# Patient Record
Sex: FEMALE | Race: BLACK OR AFRICAN AMERICAN | NOT HISPANIC OR LATINO | Employment: OTHER | URBAN - METROPOLITAN AREA
[De-identification: names, ages, dates, MRNs, and addresses within clinical notes are randomized per-mention and may not be internally consistent; named-entity substitution may affect disease eponyms.]

---

## 2019-12-04 ENCOUNTER — TELEPHONE (OUTPATIENT)
Dept: FAMILY MEDICINE | Facility: CLINIC | Age: 59
End: 2019-12-04

## 2022-10-05 ENCOUNTER — HISTORICAL (OUTPATIENT)
Dept: ADMINISTRATIVE | Facility: HOSPITAL | Age: 62
End: 2022-10-05

## 2023-07-15 ENCOUNTER — HOSPITAL ENCOUNTER (EMERGENCY)
Facility: HOSPITAL | Age: 63
Discharge: HOME | End: 2023-07-15
Payer: MEDICARE

## 2023-07-15 VITALS
SYSTOLIC BLOOD PRESSURE: 162 MMHG | RESPIRATION RATE: 22 BRPM | HEART RATE: 64 BPM | DIASTOLIC BLOOD PRESSURE: 83 MMHG | TEMPERATURE: 98 F | OXYGEN SATURATION: 95 % | WEIGHT: 230 LBS

## 2023-07-15 DIAGNOSIS — F41.9 ANXIETY: ICD-10-CM

## 2023-07-15 DIAGNOSIS — R07.9 CHEST PAIN: ICD-10-CM

## 2023-07-15 DIAGNOSIS — R06.02 SOB (SHORTNESS OF BREATH): Primary | ICD-10-CM

## 2023-07-15 LAB
ALBUMIN SERPL-MCNC: 4.4 G/DL (ref 3.4–5)
ALBUMIN/GLOB SERPL: 1.7 RATIO
ALP SERPL-CCNC: 72 UNIT/L (ref 50–144)
ALT SERPL-CCNC: 27 UNIT/L (ref 1–45)
ANION GAP SERPL CALC-SCNC: 3 MEQ/L (ref 2–13)
AST SERPL-CCNC: 36 UNIT/L (ref 14–36)
BASOPHILS # BLD AUTO: 0.02 X10(3)/MCL (ref 0.01–0.08)
BASOPHILS NFR BLD AUTO: 0.2 % (ref 0.1–1.2)
BILIRUBIN DIRECT+TOT PNL SERPL-MCNC: 0.3 MG/DL (ref 0–1)
BNP BLD-MCNC: 45.2 PG/ML (ref 10–450)
BUN SERPL-MCNC: 17 MG/DL (ref 7–20)
C-REACTIVE PROTEIN(NORTH LA, ST. MARY, RP & JENNINGS): 0.7 MG/DL (ref 0–0.9)
CALCIUM SERPL-MCNC: 8.9 MG/DL (ref 8.4–10.2)
CHLORIDE SERPL-SCNC: 106 MMOL/L (ref 98–110)
CO2 SERPL-SCNC: 29 MMOL/L (ref 21–32)
CREAT SERPL-MCNC: 1.14 MG/DL (ref 0.66–1.25)
CREAT/UREA NIT SERPL: 15 (ref 12–20)
D DIMER PPP IA.FEU-MCNC: 0.21 MG/L (ref 0.19–0.5)
EOSINOPHIL # BLD AUTO: 0.23 X10(3)/MCL (ref 0.04–0.36)
EOSINOPHIL NFR BLD AUTO: 2.5 % (ref 0.7–7)
ERYTHROCYTE [DISTWIDTH] IN BLOOD BY AUTOMATED COUNT: 12.8 % (ref 11–14.5)
GFR SERPLBLD CREATININE-BSD FMLA CKD-EPI: 54 MLS/MIN/1.73/M2
GLOBULIN SER-MCNC: 2.6 GM/DL (ref 2–3.9)
GLUCOSE SERPL-MCNC: 119 MG/DL (ref 70–115)
HCT VFR BLD AUTO: 37.1 % (ref 36–48)
HGB BLD-MCNC: 12.4 G/DL (ref 11.8–16)
IMM GRANULOCYTES # BLD AUTO: 0.03 X10(3)/MCL (ref 0–0.03)
IMM GRANULOCYTES NFR BLD AUTO: 0.3 % (ref 0–0.5)
INFLUENZA A (OHS): NEGATIVE
INFLUENZA B (OHS): NEGATIVE
LYMPHOCYTES # BLD AUTO: 2.85 X10(3)/MCL (ref 1.16–3.74)
LYMPHOCYTES NFR BLD AUTO: 30.9 % (ref 20–55)
MCH RBC QN AUTO: 29.4 PG (ref 27–34)
MCHC RBC AUTO-ENTMCNC: 33.4 G/DL (ref 31–37)
MCV RBC AUTO: 87.9 FL (ref 79–99)
MONOCYTES # BLD AUTO: 0.77 X10(3)/MCL (ref 0.24–0.36)
MONOCYTES NFR BLD AUTO: 8.4 % (ref 4.7–12.5)
NEUTROPHILS # BLD AUTO: 5.32 X10(3)/MCL (ref 1.56–6.13)
NEUTROPHILS NFR BLD AUTO: 57.7 % (ref 37–73)
NRBC BLD AUTO-RTO: 0 %
PLATELET # BLD AUTO: 226 X10(3)/MCL (ref 140–371)
PMV BLD AUTO: 10.6 FL (ref 9.4–12.4)
POTASSIUM SERPL-SCNC: 4 MMOL/L (ref 3.5–5.1)
PROT SERPL-MCNC: 7 GM/DL (ref 6.3–8.2)
RAPID GROUP A STREP (OHS): NEGATIVE
RBC # BLD AUTO: 4.22 X10(6)/MCL (ref 4–5.1)
SARS-COV-2 RDRP RESP QL NAA+PROBE: NEGATIVE
SODIUM SERPL-SCNC: 138 MMOL/L (ref 135–145)
TROPONIN I SERPL-MCNC: <0.012 NG/ML (ref 0–0.03)
WBC # SPEC AUTO: 9.22 X10(3)/MCL (ref 4–11.5)

## 2023-07-15 PROCEDURE — 85379 FIBRIN DEGRADATION QUANT: CPT

## 2023-07-15 PROCEDURE — 84484 ASSAY OF TROPONIN QUANT: CPT

## 2023-07-15 PROCEDURE — 87635 SARS-COV-2 COVID-19 AMP PRB: CPT

## 2023-07-15 PROCEDURE — 36415 COLL VENOUS BLD VENIPUNCTURE: CPT

## 2023-07-15 PROCEDURE — 85025 COMPLETE CBC W/AUTO DIFF WBC: CPT

## 2023-07-15 PROCEDURE — 87651 STREP A DNA AMP PROBE: CPT

## 2023-07-15 PROCEDURE — 99285 EMERGENCY DEPT VISIT HI MDM: CPT | Mod: 25

## 2023-07-15 PROCEDURE — 83880 ASSAY OF NATRIURETIC PEPTIDE: CPT

## 2023-07-15 PROCEDURE — 86140 C-REACTIVE PROTEIN: CPT

## 2023-07-15 PROCEDURE — 80053 COMPREHEN METABOLIC PANEL: CPT

## 2023-07-15 PROCEDURE — 87400 INFLUENZA A/B EACH AG IA: CPT

## 2023-07-15 RX ORDER — LOSARTAN POTASSIUM 50 MG/1
50 TABLET ORAL NIGHTLY
COMMUNITY
Start: 2023-06-13

## 2023-07-15 RX ORDER — ESCITALOPRAM OXALATE 10 MG/1
20 TABLET ORAL NIGHTLY
COMMUNITY
Start: 2023-06-22

## 2023-07-15 RX ORDER — LIOTHYRONINE SODIUM 5 UG/1
5 TABLET ORAL EVERY MORNING
COMMUNITY
Start: 2023-06-05

## 2023-07-15 RX ORDER — ROSUVASTATIN CALCIUM 40 MG/1
40 TABLET, COATED ORAL DAILY
COMMUNITY
Start: 2023-06-22

## 2023-07-15 RX ORDER — ESTRADIOL 1 MG/1
1 TABLET ORAL DAILY
Status: ON HOLD | COMMUNITY
Start: 2023-06-06 | End: 2023-10-27

## 2023-07-15 RX ORDER — LEVOTHYROXINE SODIUM 100 UG/1
100 TABLET ORAL DAILY
COMMUNITY
Start: 2023-06-02

## 2023-07-15 NOTE — ED PROVIDER NOTES
Encounter Date: 7/15/2023       History     Chief Complaint   Patient presents with    Chest Pain     Tightness and heaviness    Sore Throat     Throat discomfort    Cough    Shortness of Breath     Reports all sx onset x2 weeks, was seen at urgent care 07/12/23. Last albuterol tx taken 2.5 hrs PTA     63-year-old female presents complaining of cough, sore throat, congestion, shortness of breath for over a month.  Developed some tightness and heaviness in her chest tonight, and decided to come to the emergency room.  She was seen in urgent care few days ago, and prescribed cough medicine and inhaler.  She denies any fever or chills.  She denies any nausea, vomiting or diarrhea.  She has a history of lupus, and thinks her symptoms are due to that.  She also had some pedal edema of the other day.    The history is provided by the patient.   Review of patient's allergies indicates:   Allergen Reactions    Azithromycin     Levofloxacin      Past Medical History:   Diagnosis Date    Depression     Hypertension     Stroke     Systemic lupus erythematosus, organ or system involvement unspecified      Past Surgical History:   Procedure Laterality Date    CHOLECYSTECTOMY      HYSTERECTOMY      THYROIDECTOMY       History reviewed. No pertinent family history.     Review of Systems   Constitutional:  Negative for fever.   HENT:  Positive for congestion and sore throat.    Respiratory:  Positive for cough and shortness of breath.    Cardiovascular:  Positive for chest pain and leg swelling.   Gastrointestinal:  Negative for nausea.   Genitourinary:  Negative for dysuria.   Musculoskeletal:  Negative for back pain.   Skin:  Negative for rash.   Neurological:  Negative for weakness.   Hematological:  Does not bruise/bleed easily.   Psychiatric/Behavioral:  The patient is nervous/anxious.    All other systems reviewed and are negative.    Physical Exam     Initial Vitals [07/15/23 0020]   BP Pulse Resp Temp SpO2   (!) 143/98 77  17 98.3 °F (36.8 °C) 97 %      MAP       --         Physical Exam    Nursing note and vitals reviewed.  Constitutional: Vital signs are normal. She appears well-developed and well-nourished. She is cooperative.   HENT:   Head: Normocephalic and atraumatic.   Nose: Nose normal.   Mouth/Throat: Oropharynx is clear and moist.   She has mild hoarseness   Eyes: Conjunctivae, EOM and lids are normal. Pupils are equal, round, and reactive to light.   Neck: Trachea normal. Neck supple.   Normal range of motion.  Cardiovascular:  Normal rate, regular rhythm, normal heart sounds and intact distal pulses.           Pulmonary/Chest: Breath sounds normal. No respiratory distress. She has no wheezes. She has no rhonchi. She has no rales.   Abdominal: Abdomen is soft. Bowel sounds are normal.   Musculoskeletal:         General: Normal range of motion.      Cervical back: Normal, normal range of motion and neck supple.      Lumbar back: Normal.     Lymphadenopathy:     She has no cervical adenopathy.   Neurological: She is alert and oriented to person, place, and time. She has normal strength. Coordination normal.   Skin: Skin is warm, dry and intact. Capillary refill takes less than 2 seconds.   Psychiatric: Her speech is normal and behavior is normal. Judgment and thought content normal. Cognition and memory are normal.   She seems a bit anxious     ED Course   Procedures  Labs Reviewed   COMPREHENSIVE METABOLIC PANEL - Abnormal; Notable for the following components:       Result Value    Glucose Level 119 (*)     All other components within normal limits   CBC WITH DIFFERENTIAL - Abnormal; Notable for the following components:    Mono # 0.77 (*)     All other components within normal limits   RAPID INFLUENZA A/B - Normal   THROAT SCREEN, RAPID STREP - Normal   TROPONIN I - Normal   NT-PRO NATRIURETIC PEPTIDE - Normal   D DIMER, QUANTITATIVE - Normal   SARS-COV-2 RNA AMPLIFICATION, QUAL - Normal   C-REACTIVE PROTEIN - Normal    CBC W/ AUTO DIFFERENTIAL    Narrative:     The following orders were created for panel order CBC auto differential.  Procedure                               Abnormality         Status                     ---------                               -----------         ------                     CBC with Differential[[id number]]        Abnormal            Final result                 Please view results for these tests on the individual orders.        ECG Results              EKG 12-lead (Preliminary result)  Result time 07/15/23 00:19:23      Wet Read by Stanislav Herzog MD (07/15/23 00:19:23, [facility], [facility])    Normal sinus rhythm, heart rate 62, normal axis, normal intervals, normal P waves, normal QRS, normal T-waves, normal ST segments.  This is a normal EKG.                                  Imaging Results              X-Ray Chest PA And Lateral (Preliminary result)  Result time 07/15/23 00:54:39      Wet Read by Stanislav Herzog MD (07/15/23 00:54:39, [facility])    Normal cardiac silhouette, clear lung fields, no infiltrates                                     Medications - No data to display  Medical Decision Making:   Initial Assessment:   Cough congestion, sore throat, shortness of breath for a month.  Atypical chest pain today.  Anxiety.  Differential Diagnosis:   Syndrome, ACS, CHF, lupus flare up, pulmonary fibrosis, obstructive sleep apnea, anxiety disorder  Clinical Tests:   Lab Tests: Ordered  Radiological Study: Ordered  Medical Tests: Ordered  ED Management:  EKG, chest x-ray, labs  Entire workup is negative.  Vital signs and oxygenation are all good.  Continue with inhaler as needed.  Follow up with PCP on Monday.                        Clinical Impression:   Final diagnoses:  [R07.9] Chest pain  [R06.02] SOB (shortness of breath) (Primary)  [F41.9] Anxiety        ED Disposition Condition    Discharge Good           ED Prescriptions    None       Follow-up Information       Follow up With Specialties Details Why Contact Info    Deo Jacobs MD Family Medicine Call in 2 days  [address] [facility]  [address] [place] [id number] [phone]               Stanislav Herzog MD  07/15/23 0111

## 2023-10-05 ENCOUNTER — TELEPHONE (OUTPATIENT)
Dept: GASTROENTEROLOGY | Facility: CLINIC | Age: 63
End: 2023-10-05
Payer: MEDICARE

## 2023-10-05 NOTE — TELEPHONE ENCOUNTER
I Spoke to patient and she is c/o bloating, stomach pain right side, pain also under stomach, diarrhea, constipation, severe heart burn then it feels like her stomach is on fire.  She states she has lupus and she has had parasites before.  Also wants to get a Cologuard sent to her.

## 2023-10-05 NOTE — TELEPHONE ENCOUNTER
----- Message from Donna Nino sent at 10/5/2023  8:49 AM CDT -----  Contact: self  Pt is a lupus pt . She is stating that its possible that she has parasites in her intestines again. She is having pain in her lower abdomen. Please call pt asap at [phone] . Pt also wasn't cologuard sent to her home before appt .

## 2023-10-09 ENCOUNTER — TELEPHONE (OUTPATIENT)
Dept: GASTROENTEROLOGY | Facility: CLINIC | Age: 63
End: 2023-10-09
Payer: MEDICARE

## 2023-10-09 NOTE — TELEPHONE ENCOUNTER
----- Message from Autumn Sesay sent at 10/9/2023  8:36 AM CDT -----  Contact: self  Type: Staff Message  Caller: Briseyda Torres  Call Back Number: [phone]  Nature of the Call: pt is needing a sooner apt and only wants to see her DrDai Not a Np  Additional Information: na

## 2023-10-09 NOTE — TELEPHONE ENCOUNTER
Spoke to patient. She states she is having the same problems that she was when she had the parasite (giardia lamblia) back in 2018. Pt very hoarse and having issues with swallowing. -kg

## 2023-10-16 ENCOUNTER — PATIENT MESSAGE (OUTPATIENT)
Dept: GASTROENTEROLOGY | Facility: CLINIC | Age: 63
End: 2023-10-16
Payer: MEDICARE

## 2023-10-25 ENCOUNTER — PATIENT MESSAGE (OUTPATIENT)
Dept: ADMINISTRATIVE | Facility: OTHER | Age: 63
End: 2023-10-25
Payer: MEDICARE

## 2023-10-25 ENCOUNTER — LAB VISIT (OUTPATIENT)
Dept: LAB | Facility: HOSPITAL | Age: 63
End: 2023-10-25
Attending: ORTHOPAEDIC SURGERY
Payer: MEDICARE

## 2023-10-25 ENCOUNTER — OFFICE VISIT (OUTPATIENT)
Dept: ORTHOPEDICS | Facility: CLINIC | Age: 63
End: 2023-10-25
Payer: MEDICARE

## 2023-10-25 VITALS
RESPIRATION RATE: 18 BRPM | SYSTOLIC BLOOD PRESSURE: 149 MMHG | DIASTOLIC BLOOD PRESSURE: 80 MMHG | BODY MASS INDEX: 36.95 KG/M2 | WEIGHT: 229.94 LBS | HEIGHT: 66 IN | HEART RATE: 90 BPM

## 2023-10-25 DIAGNOSIS — I82.402 ACUTE DEEP VEIN THROMBOSIS (DVT) OF LEFT LOWER EXTREMITY, UNSPECIFIED VEIN: ICD-10-CM

## 2023-10-25 DIAGNOSIS — S82.042A CLOSED DISPLACED COMMINUTED FRACTURE OF LEFT PATELLA, INITIAL ENCOUNTER: ICD-10-CM

## 2023-10-25 DIAGNOSIS — S82.042A CLOSED DISPLACED COMMINUTED FRACTURE OF LEFT PATELLA, INITIAL ENCOUNTER: Primary | ICD-10-CM

## 2023-10-25 LAB
ABO AND RH: NORMAL
ALBUMIN SERPL-MCNC: 4.7 G/DL (ref 3.4–5)
ALBUMIN/GLOB SERPL: 1.8 RATIO
ALP SERPL-CCNC: 86 UNIT/L (ref 50–144)
ALT SERPL-CCNC: 23 UNIT/L (ref 1–45)
ANION GAP SERPL CALC-SCNC: 7 MEQ/L (ref 2–13)
ANTIBODY SCREEN: NORMAL
AST SERPL-CCNC: 37 UNIT/L (ref 14–36)
BASOPHILS # BLD AUTO: 0.03 X10(3)/MCL (ref 0.01–0.08)
BASOPHILS NFR BLD AUTO: 0.4 % (ref 0.1–1.2)
BILIRUB SERPL-MCNC: 0.7 MG/DL (ref 0–1)
BUN SERPL-MCNC: 14 MG/DL (ref 7–20)
CALCIUM SERPL-MCNC: 9.4 MG/DL (ref 8.4–10.2)
CHLORIDE SERPL-SCNC: 104 MMOL/L (ref 98–110)
CO2 SERPL-SCNC: 30 MMOL/L (ref 21–32)
CREAT SERPL-MCNC: 0.94 MG/DL (ref 0.66–1.25)
CREAT/UREA NIT SERPL: 15 (ref 12–20)
EOSINOPHIL # BLD AUTO: 0.2 X10(3)/MCL (ref 0.04–0.36)
EOSINOPHIL NFR BLD AUTO: 2.7 % (ref 0.7–7)
ERYTHROCYTE [DISTWIDTH] IN BLOOD BY AUTOMATED COUNT: 13.4 % (ref 11–14.5)
GFR SERPLBLD CREATININE-BSD FMLA CKD-EPI: 68 MLS/MIN/1.73/M2
GLOBULIN SER-MCNC: 2.6 GM/DL (ref 2–3.9)
GLUCOSE SERPL-MCNC: 94 MG/DL (ref 70–115)
HCT VFR BLD AUTO: 34.7 % (ref 36–48)
HGB BLD-MCNC: 11.3 G/DL (ref 11.8–16)
IMM GRANULOCYTES # BLD AUTO: 0.01 X10(3)/MCL (ref 0–0.03)
IMM GRANULOCYTES NFR BLD AUTO: 0.1 % (ref 0–0.5)
INR PPP: 1
LYMPHOCYTES # BLD AUTO: 2.32 X10(3)/MCL (ref 1.16–3.74)
LYMPHOCYTES NFR BLD AUTO: 31.4 % (ref 20–55)
MCH RBC QN AUTO: 28.9 PG (ref 27–34)
MCHC RBC AUTO-ENTMCNC: 32.6 G/DL (ref 31–37)
MCV RBC AUTO: 88.7 FL (ref 79–99)
MONOCYTES # BLD AUTO: 0.64 X10(3)/MCL (ref 0.24–0.36)
MONOCYTES NFR BLD AUTO: 8.7 % (ref 4.7–12.5)
NEUTROPHILS # BLD AUTO: 4.18 X10(3)/MCL (ref 1.56–6.13)
NEUTROPHILS NFR BLD AUTO: 56.7 % (ref 37–73)
NRBC BLD AUTO-RTO: 0 %
PLATELET # BLD AUTO: 281 X10(3)/MCL (ref 140–371)
PMV BLD AUTO: 9.4 FL (ref 9.4–12.4)
POTASSIUM SERPL-SCNC: 4.1 MMOL/L (ref 3.5–5.1)
PROT SERPL-MCNC: 7.3 GM/DL (ref 6.3–8.2)
PROTHROMBIN TIME: 10.5 SECONDS (ref 9.3–11.9)
RBC # BLD AUTO: 3.91 X10(6)/MCL (ref 4–5.1)
SODIUM SERPL-SCNC: 141 MMOL/L (ref 135–145)
SPECIMEN OUTDATE: NORMAL
WBC # SPEC AUTO: 7.38 X10(3)/MCL (ref 4–11.5)

## 2023-10-25 PROCEDURE — 80053 COMPREHEN METABOLIC PANEL: CPT

## 2023-10-25 PROCEDURE — 85025 COMPLETE CBC W/AUTO DIFF WBC: CPT

## 2023-10-25 PROCEDURE — 99204 OFFICE O/P NEW MOD 45 MIN: CPT | Mod: ,,, | Performed by: ORTHOPAEDIC SURGERY

## 2023-10-25 PROCEDURE — 85610 PROTHROMBIN TIME: CPT

## 2023-10-25 PROCEDURE — 86901 BLOOD TYPING SEROLOGIC RH(D): CPT | Performed by: ORTHOPAEDIC SURGERY

## 2023-10-25 PROCEDURE — 36415 COLL VENOUS BLD VENIPUNCTURE: CPT

## 2023-10-25 PROCEDURE — 99204 PR OFFICE/OUTPT VISIT, NEW, LEVL IV, 45-59 MIN: ICD-10-PCS | Mod: ,,, | Performed by: ORTHOPAEDIC SURGERY

## 2023-10-25 RX ORDER — PSEUDOEPHEDRINE HCL 120 MG/1
120 TABLET, FILM COATED, EXTENDED RELEASE ORAL 2 TIMES DAILY PRN
Status: ON HOLD | COMMUNITY
End: 2023-11-27 | Stop reason: HOSPADM

## 2023-10-25 RX ORDER — SUMATRIPTAN SUCCINATE 25 MG/1
1 TABLET ORAL DAILY PRN
COMMUNITY
Start: 2023-10-02

## 2023-10-25 RX ORDER — PROMETHAZINE HYDROCHLORIDE 25 MG/1
25 TABLET ORAL EVERY 6 HOURS PRN
COMMUNITY
Start: 2023-08-01

## 2023-10-25 RX ORDER — OXYCODONE AND ACETAMINOPHEN 10; 325 MG/1; MG/1
1 TABLET ORAL EVERY 6 HOURS PRN
Status: ON HOLD | COMMUNITY
Start: 2023-10-13 | End: 2023-10-27 | Stop reason: SDUPTHER

## 2023-10-25 RX ORDER — MUPIROCIN 20 MG/G
OINTMENT TOPICAL
Status: CANCELLED | OUTPATIENT
Start: 2023-10-25

## 2023-10-25 RX ORDER — ALPRAZOLAM 1 MG/1
1 TABLET ORAL NIGHTLY
COMMUNITY
Start: 2023-10-02

## 2023-10-25 RX ORDER — ENOXAPARIN SODIUM 100 MG/ML
100 INJECTION SUBCUTANEOUS
Status: ON HOLD | COMMUNITY
End: 2023-10-27 | Stop reason: HOSPADM

## 2023-10-25 RX ORDER — EPINEPHRINE 0.3 MG/.3ML
INJECTION SUBCUTANEOUS
COMMUNITY
Start: 2023-07-17

## 2023-10-25 NOTE — H&P (VIEW-ONLY)
Subjective:       Patient ID: Briseyda Torres is a 63 y.o. female.    Chief Complaint   Patient presents with    Left Knee - Injury     Doi 10/12/2023, ref by Dr. Cote in [place].  Left patella fx, brought disc.  In leg splint, nwb.  +dvt yesterday.  Has not started lovenox.  Pharmacy was out of stock.        Patient is here today for initial evaluation of injuries sustained to her left knee in a fall 2 weeks ago.  She had a difficult time scheduling an appointment with her local orthopedic surgeon after a fall.  She was found to have a displaced comminuted patella fracture.  She was also diagnosed with a left lower extremity DVT.  She was referred to me for management of her patella fracture.  She has been provided with a prescription for weight based Lovenox which she will take prior to surgery.  Plan for Xarelto 10 mg p.o. b.i.d. postop starting on postop day 1.  She has been nonweightbearing to the left lower extremity.  She was placed into a long leg splint in the orthopedics office and [place] prior to her referral to see me.  She states that her pain is controlled.  She is had multiple orthopedic surgeries in the past as tolerated them well.  She is ready for operative stabilization of her left patella.      Injury  Pertinent negatives include no abdominal pain, chest pain, chills, congestion, coughing, fever, nausea, neck pain, numbness or vomiting.       Review of Systems   Constitutional: Negative for chills, fever and malaise/fatigue.   HENT:  Negative for congestion and hearing loss.    Eyes:  Negative for visual disturbance.   Cardiovascular:  Negative for chest pain and syncope.   Respiratory:  Negative for cough and shortness of breath.    Hematologic/Lymphatic: Does not bruise/bleed easily.   Skin:  Negative for color change and suspicious lesions.   Musculoskeletal:  Negative for falls and neck pain.   Gastrointestinal:  Negative for abdominal pain, nausea and vomiting.   Genitourinary:  " Negative for dysuria and hematuria.   Neurological:  Negative for numbness and sensory change.   Psychiatric/Behavioral:  Negative for altered mental status. The patient is not nervous/anxious.         Current Outpatient Medications on File Prior to Visit   Medication Sig Dispense Refill    EScitalopram oxalate (LEXAPRO) 10 MG tablet Take 20 mg by mouth every evening.      estradioL (ESTRACE) 1 MG tablet Take 1 mg by mouth once daily.      levothyroxine (SYNTHROID) 100 MCG tablet Take 100 mcg by mouth once daily.      liothyronine (CYTOMEL) 5 MCG Tab Take 5 mcg by mouth every morning.      losartan (COZAAR) 25 MG tablet Take 25 mg by mouth every evening.      rosuvastatin (CRESTOR) 20 MG tablet Take 20 mg by mouth once daily.       No current facility-administered medications on file prior to visit.          Objective:      BP (!) 149/80   Pulse 90   Resp 18   Ht 5' 6" (1.676 m)   Wt 104.3 kg (229 lb 15 oz)   BMI 37.11 kg/m²   Physical Exam  Constitutional:       General: She is not in acute distress.     Appearance: Normal appearance. She is not ill-appearing.   HENT:      Head: Normocephalic and atraumatic.      Nose: No congestion.   Eyes:      Extraocular Movements: Extraocular movements intact.   Cardiovascular:      Rate and Rhythm: Normal rate and regular rhythm.      Pulses: Normal pulses.   Pulmonary:      Effort: Pulmonary effort is normal.      Breath sounds: Normal breath sounds.   Abdominal:      General: There is no distension.      Palpations: Abdomen is soft.      Tenderness: There is no abdominal tenderness.   Musculoskeletal:      Comments: Left lower extremity:  Swelling noted throughout the limb.  No soft tissue disruption of the anterior aspect of the knee.  No painful or prominent fracture fragments.  No attempted range motion performed.  She does have an effusion of the knee.  She is able to perform dorsiflexion plantar flexion of the ankle and digits.  Palpable DP pulse.  Her sensation " light touch is intact distally.   Skin:     General: Skin is warm and dry.   Neurological:      Mental Status: She is alert and oriented to person, place, and time. Mental status is at baseline.   Psychiatric:         Mood and Affect: Mood normal.         Behavior: Behavior normal.         Thought Content: Thought content normal.         Judgment: Judgment normal.        Body mass index is 37.11 kg/m².    Radiology:   X-ray evaluation left knee brought in from an outside office demonstrate a comminuted displaced left patella fracture.      Assessment:         1. Closed displaced comminuted fracture of left patella, initial encounter  Full code    Place in Outpatient    Case Request Operating Room: ORIF, FRACTURE, PATELLA, LEFT    Vital signs    Insert peripheral IV    Verify informed consent    Diet NPO    Reason for no Mechanical VTE Prophylaxis    CBC auto differential    Comprehensive metabolic panel    POCT urine pregnancy    Type & Screen    Protime-INR    Protime-INR    Full code    Insert peripheral IV    Verify informed consent    Diet NPO    Reason for no Mechanical VTE Prophylaxis      2. Acute deep vein thrombosis (DVT) of left lower extremity, unspecified vein  Full code    Place in Outpatient    Case Request Operating Room: ORIF, FRACTURE, PATELLA, LEFT    Vital signs    Insert peripheral IV    Verify informed consent    Diet NPO    Reason for no Mechanical VTE Prophylaxis    CBC auto differential    Comprehensive metabolic panel    POCT urine pregnancy    Type & Screen    Protime-INR    Protime-INR    Full code    Insert peripheral IV    Verify informed consent    Diet NPO    Reason for no Mechanical VTE Prophylaxis              Plan:       Extensive discussion was held with the patient today she will benefit from operative stabilization of her left patella fracture.The risks, benefits and alternatives treatment were discussed at length with the patient today including but not limited to pain,  bleeding, scarring, infection, damage to neurovascular structures, malunion/nonunion, hardware failure/irritation, need for future procedures and complications leading to amputation and even death.  Plan to have her use the Lovenox that was provided her this evening and tomorrow, she will hold the morning of surgery.  Plan for operative stabilization of her left patella on Friday.  She will begin Xarelto on Saturday.  She will be placed into a hinged knee brace locked in full extension today.  She can weight bear as tolerated in full extension.  She is happy this plan of care and all questions and concerns were addressed.    This note/OR report was created with the assistance of  voice recognition software or phone  dictation.  There may be transcription errors as a result of using this technology however minimal. Effort has been made to assure accuracy of transcription but any obvious errors or omissions should be clarified with the author of the document.         Curtis Corona MD  Orthopedic Trauma  [facility]      No follow-ups on file.    Closed displaced comminuted fracture of left patella, initial encounter  -     Full code; Standing  -     Place in Outpatient; Standing  -     Case Request Operating Room: ORIF, FRACTURE, PATELLA, LEFT  -     Vital signs; Standing  -     Insert peripheral IV; Standing  -     Verify informed consent; Standing  -     Diet NPO; Standing  -     Reason for no Mechanical VTE Prophylaxis; Standing  -     CBC auto differential; Future; Expected date: 10/25/2023  -     Comprehensive metabolic panel; Future; Expected date: 10/25/2023  -     POCT urine pregnancy  -     Type & Screen; Future; Expected date: 10/25/2023  -     Protime-INR; Standing  -     Protime-INR; Future; Expected date: 10/25/2023    Acute deep vein thrombosis (DVT) of left lower extremity, unspecified vein  -     Full code; Standing  -     Place in Outpatient; Standing  -     Case Request Operating  Room: ORIF, FRACTURE, PATELLA, LEFT  -     Vital signs; Standing  -     Insert peripheral IV; Standing  -     Verify informed consent; Standing  -     Diet NPO; Standing  -     Reason for no Mechanical VTE Prophylaxis; Standing  -     CBC auto differential; Future; Expected date: 10/25/2023  -     Comprehensive metabolic panel; Future; Expected date: 10/25/2023  -     POCT urine pregnancy  -     Type & Screen; Future; Expected date: 10/25/2023  -     Protime-INR; Standing  -     Protime-INR; Future; Expected date: 10/25/2023    Other orders  -     ceFAZolin (ANCEF) 2 g in dextrose 5 % (D5W) 50 mL IVPB  -     mupirocin 2 % ointment              Orders Placed This Encounter   Procedures    CBC auto differential     Standing Status:   Future     Standing Expiration Date:   12/23/2024    Comprehensive metabolic panel     Standing Status:   Future     Standing Expiration Date:   12/23/2024    Protime-INR     Standing Status:   Future     Standing Expiration Date:   12/23/2024    Diet NPO     Specify start time     Standing Status:   Standing     Number of Occurrences:   1    Verify informed consent     Standing Status:   Standing     Number of Occurrences:   1    Full code     Standing Status:   Standing     Number of Occurrences:   1    POCT urine pregnancy    Type & Screen     Standing Status:   Future     Standing Expiration Date:   12/23/2024     Order Specific Question:   Should this Type & Screen be considered for a 10 Day Pre-Op Extension?     Answer:   No    Insert peripheral IV     Standing Status:   Standing     Number of Occurrences:   1    Case Request Operating Room: ORIF, FRACTURE, PATELLA, LEFT     Order Specific Question:   Medical Necessity:     Answer:   Medically Urgent [101]     Order Specific Question:   CPT Code:     Answer:   ID OPEN RX PATELLA FX [27625]     Order Specific Question:   Implant Required:     Answer:   Yes [1000]     Order Specific Question:   Has Vendor been notified:     Answer:    Yes [100]     Comments:   [employer]     Order Specific Question:   Is an on-site pathologist required for this procedure?     Answer:   N/A       Future Appointments   Date Time Provider Department Center   10/25/2023 11:15 AM PREVIOUS IMAGE, RADIOLOGY [facility] Mikey Young

## 2023-10-25 NOTE — PROGRESS NOTES
Subjective:       Patient ID: Briseyda Torres is a 63 y.o. female.    Chief Complaint   Patient presents with    Left Knee - Injury     Doi 10/12/2023, ref by Dr. Cote in [place].  Left patella fx, brought disc.  In leg splint, nwb.  +dvt yesterday.  Has not started lovenox.  Pharmacy was out of stock.        Patient is here today for initial evaluation of injuries sustained to her left knee in a fall 2 weeks ago.  She had a difficult time scheduling an appointment with her local orthopedic surgeon after a fall.  She was found to have a displaced comminuted patella fracture.  She was also diagnosed with a left lower extremity DVT.  She was referred to me for management of her patella fracture.  She has been provided with a prescription for weight based Lovenox which she will take prior to surgery.  Plan for Xarelto 10 mg p.o. b.i.d. postop starting on postop day 1.  She has been nonweightbearing to the left lower extremity.  She was placed into a long leg splint in the orthopedics office and [place] prior to her referral to see me.  She states that her pain is controlled.  She is had multiple orthopedic surgeries in the past as tolerated them well.  She is ready for operative stabilization of her left patella.      Injury  Pertinent negatives include no abdominal pain, chest pain, chills, congestion, coughing, fever, nausea, neck pain, numbness or vomiting.       Review of Systems   Constitutional: Negative for chills, fever and malaise/fatigue.   HENT:  Negative for congestion and hearing loss.    Eyes:  Negative for visual disturbance.   Cardiovascular:  Negative for chest pain and syncope.   Respiratory:  Negative for cough and shortness of breath.    Hematologic/Lymphatic: Does not bruise/bleed easily.   Skin:  Negative for color change and suspicious lesions.   Musculoskeletal:  Negative for falls and neck pain.   Gastrointestinal:  Negative for abdominal pain, nausea and vomiting.   Genitourinary:  " Negative for dysuria and hematuria.   Neurological:  Negative for numbness and sensory change.   Psychiatric/Behavioral:  Negative for altered mental status. The patient is not nervous/anxious.         Current Outpatient Medications on File Prior to Visit   Medication Sig Dispense Refill    EScitalopram oxalate (LEXAPRO) 10 MG tablet Take 20 mg by mouth every evening.      estradioL (ESTRACE) 1 MG tablet Take 1 mg by mouth once daily.      levothyroxine (SYNTHROID) 100 MCG tablet Take 100 mcg by mouth once daily.      liothyronine (CYTOMEL) 5 MCG Tab Take 5 mcg by mouth every morning.      losartan (COZAAR) 25 MG tablet Take 25 mg by mouth every evening.      rosuvastatin (CRESTOR) 20 MG tablet Take 20 mg by mouth once daily.       No current facility-administered medications on file prior to visit.          Objective:      BP (!) 149/80   Pulse 90   Resp 18   Ht 5' 6" (1.676 m)   Wt 104.3 kg (229 lb 15 oz)   BMI 37.11 kg/m²   Physical Exam  Constitutional:       General: She is not in acute distress.     Appearance: Normal appearance. She is not ill-appearing.   HENT:      Head: Normocephalic and atraumatic.      Nose: No congestion.   Eyes:      Extraocular Movements: Extraocular movements intact.   Cardiovascular:      Rate and Rhythm: Normal rate and regular rhythm.      Pulses: Normal pulses.   Pulmonary:      Effort: Pulmonary effort is normal.      Breath sounds: Normal breath sounds.   Abdominal:      General: There is no distension.      Palpations: Abdomen is soft.      Tenderness: There is no abdominal tenderness.   Musculoskeletal:      Comments: Left lower extremity:  Swelling noted throughout the limb.  No soft tissue disruption of the anterior aspect of the knee.  No painful or prominent fracture fragments.  No attempted range motion performed.  She does have an effusion of the knee.  She is able to perform dorsiflexion plantar flexion of the ankle and digits.  Palpable DP pulse.  Her sensation " light touch is intact distally.   Skin:     General: Skin is warm and dry.   Neurological:      Mental Status: She is alert and oriented to person, place, and time. Mental status is at baseline.   Psychiatric:         Mood and Affect: Mood normal.         Behavior: Behavior normal.         Thought Content: Thought content normal.         Judgment: Judgment normal.        Body mass index is 37.11 kg/m².    Radiology:   X-ray evaluation left knee brought in from an outside office demonstrate a comminuted displaced left patella fracture.      Assessment:         1. Closed displaced comminuted fracture of left patella, initial encounter  Full code    Place in Outpatient    Case Request Operating Room: ORIF, FRACTURE, PATELLA, LEFT    Vital signs    Insert peripheral IV    Verify informed consent    Diet NPO    Reason for no Mechanical VTE Prophylaxis    CBC auto differential    Comprehensive metabolic panel    POCT urine pregnancy    Type & Screen    Protime-INR    Protime-INR    Full code    Insert peripheral IV    Verify informed consent    Diet NPO    Reason for no Mechanical VTE Prophylaxis      2. Acute deep vein thrombosis (DVT) of left lower extremity, unspecified vein  Full code    Place in Outpatient    Case Request Operating Room: ORIF, FRACTURE, PATELLA, LEFT    Vital signs    Insert peripheral IV    Verify informed consent    Diet NPO    Reason for no Mechanical VTE Prophylaxis    CBC auto differential    Comprehensive metabolic panel    POCT urine pregnancy    Type & Screen    Protime-INR    Protime-INR    Full code    Insert peripheral IV    Verify informed consent    Diet NPO    Reason for no Mechanical VTE Prophylaxis              Plan:       Extensive discussion was held with the patient today she will benefit from operative stabilization of her left patella fracture.The risks, benefits and alternatives treatment were discussed at length with the patient today including but not limited to pain,  bleeding, scarring, infection, damage to neurovascular structures, malunion/nonunion, hardware failure/irritation, need for future procedures and complications leading to amputation and even death.  Plan to have her use the Lovenox that was provided her this evening and tomorrow, she will hold the morning of surgery.  Plan for operative stabilization of her left patella on Friday.  She will begin Xarelto on Saturday.  She will be placed into a hinged knee brace locked in full extension today.  She can weight bear as tolerated in full extension.  She is happy this plan of care and all questions and concerns were addressed.    This note/OR report was created with the assistance of  voice recognition software or phone  dictation.  There may be transcription errors as a result of using this technology however minimal. Effort has been made to assure accuracy of transcription but any obvious errors or omissions should be clarified with the author of the document.         Curtis Corona MD  Orthopedic Trauma  [facility]      No follow-ups on file.    Closed displaced comminuted fracture of left patella, initial encounter  -     Full code; Standing  -     Place in Outpatient; Standing  -     Case Request Operating Room: ORIF, FRACTURE, PATELLA, LEFT  -     Vital signs; Standing  -     Insert peripheral IV; Standing  -     Verify informed consent; Standing  -     Diet NPO; Standing  -     Reason for no Mechanical VTE Prophylaxis; Standing  -     CBC auto differential; Future; Expected date: 10/25/2023  -     Comprehensive metabolic panel; Future; Expected date: 10/25/2023  -     POCT urine pregnancy  -     Type & Screen; Future; Expected date: 10/25/2023  -     Protime-INR; Standing  -     Protime-INR; Future; Expected date: 10/25/2023    Acute deep vein thrombosis (DVT) of left lower extremity, unspecified vein  -     Full code; Standing  -     Place in Outpatient; Standing  -     Case Request Operating  Room: ORIF, FRACTURE, PATELLA, LEFT  -     Vital signs; Standing  -     Insert peripheral IV; Standing  -     Verify informed consent; Standing  -     Diet NPO; Standing  -     Reason for no Mechanical VTE Prophylaxis; Standing  -     CBC auto differential; Future; Expected date: 10/25/2023  -     Comprehensive metabolic panel; Future; Expected date: 10/25/2023  -     POCT urine pregnancy  -     Type & Screen; Future; Expected date: 10/25/2023  -     Protime-INR; Standing  -     Protime-INR; Future; Expected date: 10/25/2023    Other orders  -     ceFAZolin (ANCEF) 2 g in dextrose 5 % (D5W) 50 mL IVPB  -     mupirocin 2 % ointment              Orders Placed This Encounter   Procedures    CBC auto differential     Standing Status:   Future     Standing Expiration Date:   12/23/2024    Comprehensive metabolic panel     Standing Status:   Future     Standing Expiration Date:   12/23/2024    Protime-INR     Standing Status:   Future     Standing Expiration Date:   12/23/2024    Diet NPO     Specify start time     Standing Status:   Standing     Number of Occurrences:   1    Verify informed consent     Standing Status:   Standing     Number of Occurrences:   1    Full code     Standing Status:   Standing     Number of Occurrences:   1    POCT urine pregnancy    Type & Screen     Standing Status:   Future     Standing Expiration Date:   12/23/2024     Order Specific Question:   Should this Type & Screen be considered for a 10 Day Pre-Op Extension?     Answer:   No    Insert peripheral IV     Standing Status:   Standing     Number of Occurrences:   1    Case Request Operating Room: ORIF, FRACTURE, PATELLA, LEFT     Order Specific Question:   Medical Necessity:     Answer:   Medically Urgent [101]     Order Specific Question:   CPT Code:     Answer:   [place] OPEN RX PATELLA FX [66590]     Order Specific Question:   Implant Required:     Answer:   Yes [1000]     Order Specific Question:   Has Vendor been notified:     Answer:    Yes [100]     Comments:   synthes     Order Specific Question:   Is an on-site pathologist required for this procedure?     Answer:   N/A       Future Appointments   Date Time Provider Department Center   10/25/2023 11:15 AM PREVIOUS IMAGE, RADIOLOGY [facility]

## 2023-10-26 ENCOUNTER — PATIENT MESSAGE (OUTPATIENT)
Dept: ADMINISTRATIVE | Facility: OTHER | Age: 63
End: 2023-10-26
Payer: MEDICARE

## 2023-10-26 ENCOUNTER — ANESTHESIA EVENT (OUTPATIENT)
Dept: SURGERY | Facility: HOSPITAL | Age: 63
End: 2023-10-26
Payer: MEDICARE

## 2023-10-26 NOTE — ANESTHESIA PREPROCEDURE EVALUATION
"                                                                                                             10/26/2023  Briseyda Torres is a 63 y.o., female who presents with known DVT of Left lower extremity and Left Patella fracture(Displace, comminuted).  Diagnosis:        Acute deep vein thrombosis (DVT) of left lower extremity, unspecified vein [I82.402]       Closed displaced comminuted fracture of left patella, initial encounter [J01.653A]    Discussed case w/  who has placed the pt.on perioperative anticoag.Tx after speaking with the pt's Cardiologistand who indicates that both he and the pt wish to proceed with Surgery. The pt.is transferred here from [place], [place] for higher level of care.      The pt. comes to [facility] for the noted procedure under GA/LMA vs GETA.  Procedure:   ORIF, FRACTURE, PATELLA, LEFT (Left: Knee) - Supine, vascular bed, bone foam, tourniquet   1st case   Synthes small CCHS, 18g wire, locking patella plates   Anesthesia type: General         PMHx:  Other Medical History   Systemic lupus erythematosus, organ or system involvement unspecified Stroke   Hypertension Depression   Acute deep vein thrombosis (DVT) of left lower extremity, unspecified vein Closed displaced comminuted fracture of left patella, initial encounter   Thyroid disease Anxiety   Insomnia SOB (shortness of breath)   HLD (hyperlipidemia) Arthritis   Fibromyalgia Migraine   Dependence on crutches      Surgical History:  HYSTERECTOMY THYROIDECTOMY   CHOLECYSTECTOMY TOTAL KNEE ARTHROPLASTY   TOTAL HIP ARTHROPLASTY LUMBAR SPINE SURGERY   ROTATOR CUFF REPAIR CATARACT EXTRACTION   COLONOSCOPY ESOPHAGOGASTRODUODENOSCOPY           Vital signs:  Pre Vitals     Current as of 10/26/23 1741  No BP, pulse, respiration, SpO2, or temperature recorded.  Height: 5' 6" (1.676 m) (10/25/23) Weight: 100.7 kg (222 lb) (10/25/23)   BMI: 35.8 IBW: 59.3 kg (130 lb 10.4 oz)           Lab Data:      EKG:        Pre-op " Assessment    I have reviewed the Patient Summary Reports.     I have reviewed the Nursing Notes. I have reviewed the NPO Status.   I have reviewed the Medications.     Review of Systems  Anesthesia Hx:  No problems with previous Anesthesia    Social:  Non-Smoker    Hematology/Oncology:  Hematology Normal   Oncology Normal     EENT/Dental:EENT/Dental Normal   Cardiovascular:   Exercise tolerance: good Hypertension  Functional Capacity good / => 4 METS    Pulmonary:   Shortness of breath    Renal/:  Renal/ Normal     Hepatic/GI:  Hepatic/GI Normal    Musculoskeletal:  Musculoskeletal Normal    Neurological:   CVA Neuromuscular Disease, Headaches    Endocrine:  Endocrine Normal    Dermatological:  Skin Normal    Psych:   Psychiatric History          Physical Exam  General: Alert, Oriented, Well nourished and Cooperative    Airway:  Mallampati: II   Mouth Opening: Normal  TM Distance: Normal  Tongue: Normal  Neck ROM: Normal ROM    Dental:  Intact    Chest/Lungs:  Clear to auscultation, Normal Respiratory Rate    Heart:  Rate: Normal  Rhythm: Regular Rhythm        Anesthesia Plan  Type of Anesthesia, risks & benefits discussed:    Anesthesia Type: Gen ETT  Intra-op Monitoring Plan: Standard ASA Monitors  Post Op Pain Control Plan: IV/PO Opioids PRN  Induction:  IV and Inhalation  Airway Plan: Direct  Informed Consent: Informed consent signed with the Patient and all parties understand the risks and agree with anesthesia plan.  All questions answered. Patient consented to blood products? Yes  ASA Score: 4  Day of Surgery Review of History & Physical: H&P Update referred to the surgeon/provider.    Ready For Surgery From Anesthesia Perspective.     .

## 2023-10-27 ENCOUNTER — ANESTHESIA (OUTPATIENT)
Dept: SURGERY | Facility: HOSPITAL | Age: 63
End: 2023-10-27
Payer: MEDICARE

## 2023-10-27 ENCOUNTER — HOSPITAL ENCOUNTER (OUTPATIENT)
Facility: HOSPITAL | Age: 63
Discharge: HOME HEALTH | End: 2023-10-29
Attending: ORTHOPAEDIC SURGERY | Admitting: INTERNAL MEDICINE
Payer: MEDICARE

## 2023-10-27 DIAGNOSIS — S82.042A CLOSED DISPLACED COMMINUTED FRACTURE OF LEFT PATELLA, INITIAL ENCOUNTER: Primary | ICD-10-CM

## 2023-10-27 DIAGNOSIS — I82.402 ACUTE DEEP VEIN THROMBOSIS (DVT) OF LEFT LOWER EXTREMITY, UNSPECIFIED VEIN: ICD-10-CM

## 2023-10-27 LAB
BASOPHILS # BLD AUTO: 0.01 X10(3)/MCL
BASOPHILS NFR BLD AUTO: 0.1 %
EOSINOPHIL # BLD AUTO: 0 X10(3)/MCL (ref 0–0.9)
EOSINOPHIL NFR BLD AUTO: 0 %
ERYTHROCYTE [DISTWIDTH] IN BLOOD BY AUTOMATED COUNT: 13.6 % (ref 11.5–17)
HCT VFR BLD AUTO: 33.7 % (ref 37–47)
HGB BLD-MCNC: 11.1 G/DL (ref 12–16)
IMM GRANULOCYTES # BLD AUTO: 0.04 X10(3)/MCL (ref 0–0.04)
IMM GRANULOCYTES NFR BLD AUTO: 0.4 %
INR PPP: 1.1
LYMPHOCYTES # BLD AUTO: 1.19 X10(3)/MCL (ref 0.6–4.6)
LYMPHOCYTES NFR BLD AUTO: 12.2 %
MCH RBC QN AUTO: 29.1 PG (ref 27–31)
MCHC RBC AUTO-ENTMCNC: 32.9 G/DL (ref 33–36)
MCV RBC AUTO: 88.5 FL (ref 80–94)
MONOCYTES # BLD AUTO: 0.53 X10(3)/MCL (ref 0.1–1.3)
MONOCYTES NFR BLD AUTO: 5.4 %
NEUTROPHILS # BLD AUTO: 8.01 X10(3)/MCL (ref 2.1–9.2)
NEUTROPHILS NFR BLD AUTO: 81.9 %
NRBC BLD AUTO-RTO: 0 %
PLATELET # BLD AUTO: 197 X10(3)/MCL (ref 130–400)
PMV BLD AUTO: 10.6 FL (ref 7.4–10.4)
PROTHROMBIN TIME: 14.2 SECONDS (ref 12.5–14.5)
RBC # BLD AUTO: 3.81 X10(6)/MCL (ref 4.2–5.4)
WBC # SPEC AUTO: 9.78 X10(3)/MCL (ref 4.5–11.5)

## 2023-10-27 PROCEDURE — C1713 ANCHOR/SCREW BN/BN,TIS/BN: HCPCS | Performed by: ORTHOPAEDIC SURGERY

## 2023-10-27 PROCEDURE — 63600175 PHARM REV CODE 636 W HCPCS: Performed by: ANESTHESIOLOGY

## 2023-10-27 PROCEDURE — 71000015 HC POSTOP RECOV 1ST HR: Performed by: ORTHOPAEDIC SURGERY

## 2023-10-27 PROCEDURE — 63600175 PHARM REV CODE 636 W HCPCS: Performed by: INTERNAL MEDICINE

## 2023-10-27 PROCEDURE — D9220A PRA ANESTHESIA: ICD-10-PCS | Mod: CRNA,,, | Performed by: NURSE ANESTHETIST, CERTIFIED REGISTERED

## 2023-10-27 PROCEDURE — 63600175 PHARM REV CODE 636 W HCPCS: Performed by: ORTHOPAEDIC SURGERY

## 2023-10-27 PROCEDURE — 25000003 PHARM REV CODE 250: Performed by: INTERNAL MEDICINE

## 2023-10-27 PROCEDURE — 36000711: Performed by: ORTHOPAEDIC SURGERY

## 2023-10-27 PROCEDURE — D9220A PRA ANESTHESIA: ICD-10-PCS | Mod: ANES,,, | Performed by: ANESTHESIOLOGY

## 2023-10-27 PROCEDURE — 37000009 HC ANESTHESIA EA ADD 15 MINS: Performed by: ORTHOPAEDIC SURGERY

## 2023-10-27 PROCEDURE — 27524 TREAT KNEECAP FRACTURE: CPT | Mod: LT,,, | Performed by: ORTHOPAEDIC SURGERY

## 2023-10-27 PROCEDURE — 71000033 HC RECOVERY, INTIAL HOUR: Performed by: ORTHOPAEDIC SURGERY

## 2023-10-27 PROCEDURE — 37000008 HC ANESTHESIA 1ST 15 MINUTES: Performed by: ORTHOPAEDIC SURGERY

## 2023-10-27 PROCEDURE — 85025 COMPLETE CBC W/AUTO DIFF WBC: CPT | Performed by: PHYSICIAN ASSISTANT

## 2023-10-27 PROCEDURE — 27201423 OPTIME MED/SURG SUP & DEVICES STERILE SUPPLY: Performed by: ORTHOPAEDIC SURGERY

## 2023-10-27 PROCEDURE — G0378 HOSPITAL OBSERVATION PER HR: HCPCS

## 2023-10-27 PROCEDURE — 25000003 PHARM REV CODE 250: Performed by: ORTHOPAEDIC SURGERY

## 2023-10-27 PROCEDURE — 27524 PR OPEN RX PATELLA FX: ICD-10-PCS | Mod: LT,,, | Performed by: ORTHOPAEDIC SURGERY

## 2023-10-27 PROCEDURE — 63600175 PHARM REV CODE 636 W HCPCS

## 2023-10-27 PROCEDURE — 85610 PROTHROMBIN TIME: CPT | Performed by: ORTHOPAEDIC SURGERY

## 2023-10-27 PROCEDURE — 36000710: Performed by: ORTHOPAEDIC SURGERY

## 2023-10-27 PROCEDURE — 71000039 HC RECOVERY, EACH ADD'L HOUR: Performed by: ORTHOPAEDIC SURGERY

## 2023-10-27 PROCEDURE — D9220A PRA ANESTHESIA: Mod: ANES,,, | Performed by: ANESTHESIOLOGY

## 2023-10-27 PROCEDURE — D9220A PRA ANESTHESIA: Mod: CRNA,,, | Performed by: NURSE ANESTHETIST, CERTIFIED REGISTERED

## 2023-10-27 PROCEDURE — 71000016 HC POSTOP RECOV ADDL HR: Performed by: ORTHOPAEDIC SURGERY

## 2023-10-27 PROCEDURE — 96372 THER/PROPH/DIAG INJ SC/IM: CPT | Mod: 59 | Performed by: INTERNAL MEDICINE

## 2023-10-27 PROCEDURE — 25000003 PHARM REV CODE 250

## 2023-10-27 DEVICE — SCREW LOCKING BONE 2.7X20MM: Type: IMPLANTABLE DEVICE | Site: LEG | Status: FUNCTIONAL

## 2023-10-27 DEVICE — SCREW 2.7MM X 14 LOCKING ANGLE: Type: IMPLANTABLE DEVICE | Site: LEG | Status: FUNCTIONAL

## 2023-10-27 DEVICE — SCREW LOCKING BONE 2.7X24MM: Type: IMPLANTABLE DEVICE | Site: LEG | Status: FUNCTIONAL

## 2023-10-27 DEVICE — SCREW BONE LOCK VA 2.7X40MM: Type: IMPLANTABLE DEVICE | Site: LEG | Status: FUNCTIONAL

## 2023-10-27 DEVICE — SCREW LOCKING BONE 2.7X22MM: Type: IMPLANTABLE DEVICE | Site: LEG | Status: FUNCTIONAL

## 2023-10-27 DEVICE — SCREW 2.7MM X 18 LOCKING ANGLE: Type: IMPLANTABLE DEVICE | Site: LEG | Status: FUNCTIONAL

## 2023-10-27 DEVICE — SCREW VA LOK ST T8 2.7X42MM: Type: IMPLANTABLE DEVICE | Site: LEG | Status: FUNCTIONAL

## 2023-10-27 DEVICE — SCREW BONE LOCK VA 2.7X26MM: Type: IMPLANTABLE DEVICE | Site: LEG | Status: FUNCTIONAL

## 2023-10-27 RX ORDER — HYDROMORPHONE HYDROCHLORIDE 2 MG/ML
2 INJECTION, SOLUTION INTRAMUSCULAR; INTRAVENOUS; SUBCUTANEOUS ONCE
Status: DISCONTINUED | OUTPATIENT
Start: 2023-10-27 | End: 2023-10-28

## 2023-10-27 RX ORDER — GABAPENTIN 300 MG/1
300 CAPSULE ORAL 3 TIMES DAILY
Status: DISCONTINUED | OUTPATIENT
Start: 2023-10-27 | End: 2023-10-29 | Stop reason: HOSPADM

## 2023-10-27 RX ORDER — METHOCARBAMOL 500 MG/1
500 TABLET, FILM COATED ORAL 3 TIMES DAILY PRN
Status: DISCONTINUED | OUTPATIENT
Start: 2023-10-27 | End: 2023-10-27

## 2023-10-27 RX ORDER — HYDRALAZINE HYDROCHLORIDE 20 MG/ML
20 INJECTION INTRAMUSCULAR; INTRAVENOUS ONCE
Status: COMPLETED | OUTPATIENT
Start: 2023-10-27 | End: 2023-10-27

## 2023-10-27 RX ORDER — ACETAMINOPHEN 10 MG/ML
1000 INJECTION, SOLUTION INTRAVENOUS EVERY 6 HOURS
Status: DISPENSED | OUTPATIENT
Start: 2023-10-27 | End: 2023-10-28

## 2023-10-27 RX ORDER — PROCHLORPERAZINE EDISYLATE 5 MG/ML
5 INJECTION INTRAMUSCULAR; INTRAVENOUS EVERY 30 MIN PRN
Status: DISCONTINUED | OUTPATIENT
Start: 2023-10-27 | End: 2023-10-27 | Stop reason: HOSPADM

## 2023-10-27 RX ORDER — KETOROLAC TROMETHAMINE 30 MG/ML
30 INJECTION, SOLUTION INTRAMUSCULAR; INTRAVENOUS EVERY 6 HOURS
Status: COMPLETED | OUTPATIENT
Start: 2023-10-27 | End: 2023-10-28

## 2023-10-27 RX ORDER — ENOXAPARIN SODIUM 150 MG/ML
1 INJECTION SUBCUTANEOUS ONCE
Status: COMPLETED | OUTPATIENT
Start: 2023-10-27 | End: 2023-10-27

## 2023-10-27 RX ORDER — ALPRAZOLAM 0.5 MG/1
1 TABLET ORAL NIGHTLY
Status: DISCONTINUED | OUTPATIENT
Start: 2023-10-27 | End: 2023-10-29 | Stop reason: HOSPADM

## 2023-10-27 RX ORDER — OXYCODONE HCL 10 MG/1
10 TABLET, FILM COATED, EXTENDED RELEASE ORAL EVERY 4 HOURS PRN
Status: DISCONTINUED | OUTPATIENT
Start: 2023-10-27 | End: 2023-10-28

## 2023-10-27 RX ORDER — GLYCOPYRROLATE 0.2 MG/ML
INJECTION INTRAMUSCULAR; INTRAVENOUS
Status: DISCONTINUED | OUTPATIENT
Start: 2023-10-27 | End: 2023-10-27

## 2023-10-27 RX ORDER — HYDROMORPHONE HYDROCHLORIDE 2 MG/ML
1 INJECTION, SOLUTION INTRAMUSCULAR; INTRAVENOUS; SUBCUTANEOUS ONCE
Status: COMPLETED | OUTPATIENT
Start: 2023-10-27 | End: 2023-10-27

## 2023-10-27 RX ORDER — HYDROMORPHONE HYDROCHLORIDE 2 MG/ML
INJECTION, SOLUTION INTRAMUSCULAR; INTRAVENOUS; SUBCUTANEOUS
Status: DISCONTINUED | OUTPATIENT
Start: 2023-10-27 | End: 2023-10-27

## 2023-10-27 RX ORDER — HYDRALAZINE HYDROCHLORIDE 20 MG/ML
INJECTION INTRAMUSCULAR; INTRAVENOUS
Status: COMPLETED
Start: 2023-10-27 | End: 2023-10-27

## 2023-10-27 RX ORDER — ESCITALOPRAM OXALATE 10 MG/1
20 TABLET ORAL NIGHTLY
Status: DISCONTINUED | OUTPATIENT
Start: 2023-10-27 | End: 2023-10-29 | Stop reason: HOSPADM

## 2023-10-27 RX ORDER — HYDRALAZINE HYDROCHLORIDE 20 MG/ML
5 INJECTION INTRAMUSCULAR; INTRAVENOUS
Status: DISCONTINUED | OUTPATIENT
Start: 2023-10-27 | End: 2023-10-27 | Stop reason: HOSPADM

## 2023-10-27 RX ORDER — MIDAZOLAM HYDROCHLORIDE 1 MG/ML
INJECTION INTRAMUSCULAR; INTRAVENOUS
Status: DISCONTINUED | OUTPATIENT
Start: 2023-10-27 | End: 2023-10-27

## 2023-10-27 RX ORDER — LABETALOL HYDROCHLORIDE 5 MG/ML
5 INJECTION, SOLUTION INTRAVENOUS
Status: DISCONTINUED | OUTPATIENT
Start: 2023-10-27 | End: 2023-10-27 | Stop reason: HOSPADM

## 2023-10-27 RX ORDER — OXYCODONE AND ACETAMINOPHEN 10; 325 MG/1; MG/1
1 TABLET ORAL EVERY 4 HOURS PRN
Qty: 42 TABLET | Refills: 0 | OUTPATIENT
Start: 2023-10-27 | End: 2023-10-29

## 2023-10-27 RX ORDER — OXYCODONE AND ACETAMINOPHEN 10; 325 MG/1; MG/1
1 TABLET ORAL EVERY 4 HOURS PRN
Status: DISCONTINUED | OUTPATIENT
Start: 2023-10-27 | End: 2023-10-27

## 2023-10-27 RX ORDER — METHOCARBAMOL 750 MG/1
TABLET, FILM COATED ORAL
Qty: 42 TABLET | Refills: 0 | Status: ON HOLD | OUTPATIENT
Start: 2023-10-27 | End: 2023-11-27 | Stop reason: HOSPADM

## 2023-10-27 RX ORDER — SODIUM CHLORIDE 0.9 % (FLUSH) 0.9 %
10 SYRINGE (ML) INJECTION
Status: DISCONTINUED | OUTPATIENT
Start: 2023-10-27 | End: 2023-10-27 | Stop reason: HOSPADM

## 2023-10-27 RX ORDER — VANCOMYCIN HYDROCHLORIDE 1 G/20ML
INJECTION, POWDER, LYOPHILIZED, FOR SOLUTION INTRAVENOUS
Status: DISCONTINUED | OUTPATIENT
Start: 2023-10-27 | End: 2023-10-27 | Stop reason: HOSPADM

## 2023-10-27 RX ORDER — CEFAZOLIN SODIUM 2 G/50ML
2 SOLUTION INTRAVENOUS
Status: COMPLETED | OUTPATIENT
Start: 2023-10-27 | End: 2023-10-27

## 2023-10-27 RX ORDER — ALPRAZOLAM 0.5 MG/1
1 TABLET ORAL NIGHTLY
Status: DISCONTINUED | OUTPATIENT
Start: 2023-10-27 | End: 2023-10-27

## 2023-10-27 RX ORDER — LOSARTAN POTASSIUM 25 MG/1
25 TABLET ORAL NIGHTLY
Status: DISCONTINUED | OUTPATIENT
Start: 2023-10-27 | End: 2023-10-29 | Stop reason: HOSPADM

## 2023-10-27 RX ORDER — METHOCARBAMOL 500 MG/1
500 TABLET, FILM COATED ORAL 3 TIMES DAILY
Status: DISCONTINUED | OUTPATIENT
Start: 2023-10-27 | End: 2023-10-27

## 2023-10-27 RX ORDER — MEPERIDINE HYDROCHLORIDE 25 MG/ML
12.5 INJECTION INTRAMUSCULAR; INTRAVENOUS; SUBCUTANEOUS EVERY 10 MIN PRN
Status: COMPLETED | OUTPATIENT
Start: 2023-10-27 | End: 2023-10-27

## 2023-10-27 RX ORDER — ONDANSETRON 2 MG/ML
4 INJECTION INTRAMUSCULAR; INTRAVENOUS ONCE AS NEEDED
Status: COMPLETED | OUTPATIENT
Start: 2023-10-27 | End: 2023-10-27

## 2023-10-27 RX ORDER — METHOCARBAMOL 100 MG/ML
1000 INJECTION, SOLUTION INTRAMUSCULAR; INTRAVENOUS ONCE AS NEEDED
Status: COMPLETED | OUTPATIENT
Start: 2023-10-27 | End: 2023-10-27

## 2023-10-27 RX ORDER — FENTANYL CITRATE 50 UG/ML
INJECTION, SOLUTION INTRAMUSCULAR; INTRAVENOUS
Status: DISCONTINUED | OUTPATIENT
Start: 2023-10-27 | End: 2023-10-27

## 2023-10-27 RX ORDER — ACETAMINOPHEN 10 MG/ML
INJECTION, SOLUTION INTRAVENOUS
Status: DISCONTINUED | OUTPATIENT
Start: 2023-10-27 | End: 2023-10-27

## 2023-10-27 RX ORDER — ESMOLOL HYDROCHLORIDE 10 MG/ML
INJECTION INTRAVENOUS
Status: DISCONTINUED | OUTPATIENT
Start: 2023-10-27 | End: 2023-10-27

## 2023-10-27 RX ORDER — ROCURONIUM BROMIDE 10 MG/ML
INJECTION, SOLUTION INTRAVENOUS
Status: DISCONTINUED | OUTPATIENT
Start: 2023-10-27 | End: 2023-10-27

## 2023-10-27 RX ORDER — LIOTHYRONINE SODIUM 5 UG/1
5 TABLET ORAL EVERY MORNING
Status: DISCONTINUED | OUTPATIENT
Start: 2023-10-28 | End: 2023-10-29 | Stop reason: HOSPADM

## 2023-10-27 RX ORDER — HYDROMORPHONE HYDROCHLORIDE 2 MG/ML
0.4 INJECTION, SOLUTION INTRAMUSCULAR; INTRAVENOUS; SUBCUTANEOUS EVERY 5 MIN PRN
Status: DISCONTINUED | OUTPATIENT
Start: 2023-10-27 | End: 2023-10-27 | Stop reason: HOSPADM

## 2023-10-27 RX ORDER — METHOCARBAMOL 750 MG/1
750 TABLET, FILM COATED ORAL EVERY 8 HOURS
Status: DISCONTINUED | OUTPATIENT
Start: 2023-10-27 | End: 2023-10-27

## 2023-10-27 RX ORDER — LEVOTHYROXINE SODIUM 100 UG/1
100 TABLET ORAL DAILY
Status: DISCONTINUED | OUTPATIENT
Start: 2023-10-28 | End: 2023-10-29 | Stop reason: HOSPADM

## 2023-10-27 RX ORDER — LABETALOL HYDROCHLORIDE 5 MG/ML
INJECTION, SOLUTION INTRAVENOUS
Status: COMPLETED
Start: 2023-10-27 | End: 2023-10-27

## 2023-10-27 RX ORDER — DEXAMETHASONE SODIUM PHOSPHATE 4 MG/ML
INJECTION, SOLUTION INTRA-ARTICULAR; INTRALESIONAL; INTRAMUSCULAR; INTRAVENOUS; SOFT TISSUE
Status: DISCONTINUED | OUTPATIENT
Start: 2023-10-27 | End: 2023-10-27

## 2023-10-27 RX ORDER — MORPHINE SULFATE 10 MG/ML
4 INJECTION INTRAMUSCULAR; INTRAVENOUS; SUBCUTANEOUS
Status: DISCONTINUED | OUTPATIENT
Start: 2023-10-27 | End: 2023-10-28

## 2023-10-27 RX ORDER — MORPHINE SULFATE 10 MG/ML
4 INJECTION INTRAMUSCULAR; INTRAVENOUS; SUBCUTANEOUS EVERY 6 HOURS PRN
Status: DISCONTINUED | OUTPATIENT
Start: 2023-10-27 | End: 2023-10-28

## 2023-10-27 RX ORDER — ONDANSETRON 2 MG/ML
INJECTION INTRAMUSCULAR; INTRAVENOUS
Status: DISCONTINUED | OUTPATIENT
Start: 2023-10-27 | End: 2023-10-27

## 2023-10-27 RX ORDER — GABAPENTIN 300 MG/1
300 CAPSULE ORAL 3 TIMES DAILY
Status: DISCONTINUED | OUTPATIENT
Start: 2023-10-27 | End: 2023-10-27

## 2023-10-27 RX ORDER — ATORVASTATIN CALCIUM 40 MG/1
40 TABLET, FILM COATED ORAL NIGHTLY
Status: DISCONTINUED | OUTPATIENT
Start: 2023-10-27 | End: 2023-10-29 | Stop reason: HOSPADM

## 2023-10-27 RX ORDER — SUMATRIPTAN SUCCINATE 25 MG/1
25 TABLET ORAL DAILY PRN
Status: DISCONTINUED | OUTPATIENT
Start: 2023-10-27 | End: 2023-10-29 | Stop reason: HOSPADM

## 2023-10-27 RX ORDER — OXYCODONE AND ACETAMINOPHEN 5; 325 MG/1; MG/1
1 TABLET ORAL EVERY 4 HOURS PRN
Status: DISCONTINUED | OUTPATIENT
Start: 2023-10-27 | End: 2023-10-27

## 2023-10-27 RX ORDER — MUPIROCIN 20 MG/G
OINTMENT TOPICAL
Status: DISCONTINUED | OUTPATIENT
Start: 2023-10-27 | End: 2023-10-27 | Stop reason: HOSPADM

## 2023-10-27 RX ORDER — KETOROLAC TROMETHAMINE 30 MG/ML
30 INJECTION, SOLUTION INTRAMUSCULAR; INTRAVENOUS ONCE
Status: COMPLETED | OUTPATIENT
Start: 2023-10-27 | End: 2023-10-27

## 2023-10-27 RX ORDER — KETOROLAC TROMETHAMINE 30 MG/ML
30 INJECTION, SOLUTION INTRAMUSCULAR; INTRAVENOUS EVERY 6 HOURS
Status: DISCONTINUED | OUTPATIENT
Start: 2023-10-27 | End: 2023-10-27

## 2023-10-27 RX ORDER — METHOCARBAMOL 750 MG/1
750 TABLET, FILM COATED ORAL EVERY 8 HOURS
Status: DISCONTINUED | OUTPATIENT
Start: 2023-10-27 | End: 2023-10-29 | Stop reason: HOSPADM

## 2023-10-27 RX ORDER — PROPOFOL 10 MG/ML
VIAL (ML) INTRAVENOUS
Status: DISCONTINUED | OUTPATIENT
Start: 2023-10-27 | End: 2023-10-27

## 2023-10-27 RX ADMIN — LABETALOL HYDROCHLORIDE 5 MG: 5 INJECTION INTRAVENOUS at 09:10

## 2023-10-27 RX ADMIN — METHOCARBAMOL 1000 MG: 100 INJECTION INTRAMUSCULAR; INTRAVENOUS at 09:10

## 2023-10-27 RX ADMIN — HYDROMORPHONE HYDROCHLORIDE 1 MG: 2 INJECTION INTRAMUSCULAR; INTRAVENOUS; SUBCUTANEOUS at 02:10

## 2023-10-27 RX ADMIN — ONDANSETRON 4 MG: 2 INJECTION INTRAMUSCULAR; INTRAVENOUS at 07:10

## 2023-10-27 RX ADMIN — HYDROMORPHONE HYDROCHLORIDE 0.4 MG: 2 INJECTION, SOLUTION INTRAMUSCULAR; INTRAVENOUS; SUBCUTANEOUS at 08:10

## 2023-10-27 RX ADMIN — ALPRAZOLAM 1 MG: 0.5 TABLET ORAL at 05:10

## 2023-10-27 RX ADMIN — PROPOFOL 50 MG: 10 INJECTION, EMULSION INTRAVENOUS at 07:10

## 2023-10-27 RX ADMIN — ROCURONIUM BROMIDE 50 MG: 10 SOLUTION INTRAVENOUS at 07:10

## 2023-10-27 RX ADMIN — HYDROMORPHONE HYDROCHLORIDE 0.2 MG: 2 INJECTION, SOLUTION INTRAMUSCULAR; INTRAVENOUS; SUBCUTANEOUS at 09:10

## 2023-10-27 RX ADMIN — KETOROLAC TROMETHAMINE 30 MG: 30 INJECTION, SOLUTION INTRAMUSCULAR at 09:10

## 2023-10-27 RX ADMIN — MEPERIDINE HYDROCHLORIDE 12.5 MG: 25 INJECTION INTRAMUSCULAR; INTRAVENOUS; SUBCUTANEOUS at 10:10

## 2023-10-27 RX ADMIN — FENTANYL CITRATE 100 MCG: 50 INJECTION, SOLUTION INTRAMUSCULAR; INTRAVENOUS at 07:10

## 2023-10-27 RX ADMIN — HYDROMORPHONE HYDROCHLORIDE 0.4 MG: 2 INJECTION INTRAMUSCULAR; INTRAVENOUS; SUBCUTANEOUS at 01:10

## 2023-10-27 RX ADMIN — HYDRALAZINE HYDROCHLORIDE 5 MG: 20 INJECTION INTRAMUSCULAR; INTRAVENOUS at 10:10

## 2023-10-27 RX ADMIN — LABETALOL HYDROCHLORIDE 5 MG/MIN: 5 INJECTION, SOLUTION INTRAVENOUS at 09:10

## 2023-10-27 RX ADMIN — OXYCODONE HYDROCHLORIDE 10 MG: 10 TABLET, FILM COATED, EXTENDED RELEASE ORAL at 10:10

## 2023-10-27 RX ADMIN — SODIUM CHLORIDE, SODIUM GLUCONATE, SODIUM ACETATE, POTASSIUM CHLORIDE AND MAGNESIUM CHLORIDE: 526; 502; 368; 37; 30 INJECTION, SOLUTION INTRAVENOUS at 07:10

## 2023-10-27 RX ADMIN — CEFAZOLIN SODIUM 2 G: 2 SOLUTION INTRAVENOUS at 07:10

## 2023-10-27 RX ADMIN — HYDROMORPHONE HYDROCHLORIDE 0.5 MG: 2 INJECTION, SOLUTION INTRAMUSCULAR; INTRAVENOUS; SUBCUTANEOUS at 09:10

## 2023-10-27 RX ADMIN — DEXAMETHASONE SODIUM PHOSPHATE 8 MG: 4 INJECTION, SOLUTION INTRA-ARTICULAR; INTRALESIONAL; INTRAMUSCULAR; INTRAVENOUS; SOFT TISSUE at 07:10

## 2023-10-27 RX ADMIN — GABAPENTIN 300 MG: 300 CAPSULE ORAL at 05:10

## 2023-10-27 RX ADMIN — ONDANSETRON 4 MG: 2 INJECTION INTRAMUSCULAR; INTRAVENOUS at 08:10

## 2023-10-27 RX ADMIN — KETOROLAC TROMETHAMINE 30 MG: 30 INJECTION, SOLUTION INTRAMUSCULAR; INTRAVENOUS at 03:10

## 2023-10-27 RX ADMIN — HYDROMORPHONE HYDROCHLORIDE 0.2 MG: 2 INJECTION INTRAMUSCULAR; INTRAVENOUS; SUBCUTANEOUS at 01:10

## 2023-10-27 RX ADMIN — HYDROMORPHONE HYDROCHLORIDE 0.4 MG: 2 INJECTION, SOLUTION INTRAMUSCULAR; INTRAVENOUS; SUBCUTANEOUS at 09:10

## 2023-10-27 RX ADMIN — METHOCARBAMOL 750 MG: 750 TABLET ORAL at 05:10

## 2023-10-27 RX ADMIN — HYDRALAZINE HYDROCHLORIDE 10 MG: 20 INJECTION INTRAMUSCULAR; INTRAVENOUS at 03:10

## 2023-10-27 RX ADMIN — ESMOLOL HYDROCHLORIDE 20 MG: 100 INJECTION, SOLUTION INTRAVENOUS at 07:10

## 2023-10-27 RX ADMIN — HYDROMORPHONE HYDROCHLORIDE 0.2 MG: 2 INJECTION, SOLUTION INTRAMUSCULAR; INTRAVENOUS; SUBCUTANEOUS at 07:10

## 2023-10-27 RX ADMIN — OXYCODONE HYDROCHLORIDE AND ACETAMINOPHEN 1 TABLET: 10; 325 TABLET ORAL at 11:10

## 2023-10-27 RX ADMIN — HYDROMORPHONE HYDROCHLORIDE 0.3 MG: 2 INJECTION, SOLUTION INTRAMUSCULAR; INTRAVENOUS; SUBCUTANEOUS at 09:10

## 2023-10-27 RX ADMIN — MIDAZOLAM HYDROCHLORIDE 2 MG: 1 INJECTION, SOLUTION INTRAMUSCULAR; INTRAVENOUS at 07:10

## 2023-10-27 RX ADMIN — PROPOFOL 100 MG: 10 INJECTION, EMULSION INTRAVENOUS at 07:10

## 2023-10-27 RX ADMIN — OXYCODONE HYDROCHLORIDE AND ACETAMINOPHEN 1 TABLET: 10; 325 TABLET ORAL at 03:10

## 2023-10-27 RX ADMIN — PROPOFOL 150 MG: 10 INJECTION, EMULSION INTRAVENOUS at 07:10

## 2023-10-27 RX ADMIN — SUGAMMADEX 200 MG: 100 INJECTION, SOLUTION INTRAVENOUS at 08:10

## 2023-10-27 RX ADMIN — ENOXAPARIN SODIUM 105 MG: 150 INJECTION SUBCUTANEOUS at 09:10

## 2023-10-27 RX ADMIN — GLYCOPYRROLATE 0.1 MG: 0.2 INJECTION INTRAMUSCULAR; INTRAVENOUS at 07:10

## 2023-10-27 RX ADMIN — ACETAMINOPHEN 1000 MG: 10 INJECTION, SOLUTION INTRAVENOUS at 05:10

## 2023-10-27 RX ADMIN — ACETAMINOPHEN 1000 MG: 10 INJECTION, SOLUTION INTRAVENOUS at 07:10

## 2023-10-27 RX ADMIN — LABETALOL HYDROCHLORIDE 5 MG: 5 INJECTION INTRAVENOUS at 10:10

## 2023-10-27 NOTE — INTERVAL H&P NOTE
I have seen the patient and reviewed this note, and there is no change in history and physical since the last exam.    Curtis Corona MD

## 2023-10-27 NOTE — BRIEF OP NOTE
[facility] - Periop Services  Brief Operative Note    Surgery Date: 10/27/2023     Surgeon(s) and Role:     * Curtis Corona MD - Primary    Assisting Surgeon: None    Pre-op Diagnosis:  Acute deep vein thrombosis (DVT) of left lower extremity, unspecified vein [I82.402]  Closed displaced comminuted fracture of left patella, initial encounter [S82.042A]    Post-op Diagnosis:  Post-Op Diagnosis Codes:     * Acute deep vein thrombosis (DVT) of left lower extremity, unspecified vein [I82.402]     * Closed displaced comminuted fracture of left patella, initial encounter [S82.042A]    Procedure(s) (LRB):  ORIF, FRACTURE, PATELLA, LEFT (Left)    Anesthesia: General    Operative Findings: see op report    Estimated Blood Loss: 100 mL         Specimens:   Specimen (24h ago, onward)      None              Discharge Note    OUTCOME: Patient tolerated treatment/procedure well without complication and is now ready for discharge.    DISPOSITION: Home or Self Care    FINAL DIAGNOSIS:  as above    FOLLOWUP: In clinic    DISCHARGE INSTRUCTIONS:   -Tolerated procedure well  - D/C home today  - WBAT to LLE in full extension  - Knee brace in place at all times  - Remove provena wound vac in 5 days and begin dry dressings  - No ROM of knee  - F/U in 2 weeks      Curtis Corona MD  Orthopedic Trauma  [facility]

## 2023-10-27 NOTE — PLAN OF CARE
Dc instructions provided to pt/fm at bedside via verbal/written instructions  Pt/fm verbalized understanding,questions answered regarding  BLEEDING: If surgical site begins to bleed , contact your doctor or go to ER    NAUSEA: due to the anesthesia, you may experience nausea for up to 24 hours. If nausea and vomiting last longer, contact your doctor.     INFECTION:  watch for any signs or symptoms of infection such as chills, fever, redness or drainage at surgical site. Notify your doctor     PAIN : take your pain medications as directed. If the pain medications are not helping, notify your doctor.

## 2023-10-27 NOTE — H&P
[facility] Hospital Medicine History & Physical Examination       Patient Name: Briseyda Torres  MRN: [id number]  Patient Class: OP- Outpatient   Admission Date: 10/27/2023   Admitting Physician: Curtis Corona MD   Length of Stay: 0  Attending Physician: Laureano Mei MD   Primary Care Provider: Deo Jacobs MD  Face-to-Face encounter date: 10/27/2023  Code Status: Full Code   Chief Complaint: Knee surgery      Patient information was obtained from patient, patient's family, past medical records and ER records.     HISTORY OF PRESENT ILLNESS:   Briseyda Torres is a 63 y.o. Black or  female with a past medical history of hypertension hyperlipidemia, lupus, CVA, migraine headaches, fibromyalgia, depression/anxiety, DVT left lower extremity diagnosed on 10/25/2023 and left patella fracture on 10/12/2023 status post an elective ORIF of a left patella with Dr. Curtis Corona today (10/27/2023).  Patient reports she was sent a prescription for Xarelto 10 mg twice a day which was filled but she was unable to pick it up as the insurance would not cover the medications and it would cost $1300 dollars. She denied using Lovenox in the mean time but states she gave herself a total of 4 injections since DVT diagnosis. Patient was having anxiety and hypertension with systolic ranging from 180s to 210s post up but no home medications were restarted. Patient received hydralazine with improvement in pain. She is continuing to experience extreme pain to the left leg. Ortho is planning for discharge tomorrow.  Patient is admitted to hospital medicine services for further medical management.    PAST MEDICAL HISTORY:     Past Medical History:   Diagnosis Date    Acute deep vein thrombosis (DVT) of left lower extremity, unspecified vein     Anxiety     Arthritis     Closed displaced comminuted fracture of left patella, initial encounter     Dependence on crutches     Depression      Fibromyalgia     HLD (hyperlipidemia)     Hypertension     Insomnia     Migraine     SOB (shortness of breath)     Stroke 2005    Systemic lupus erythematosus, organ or system involvement unspecified     Thyroid disease        PAST SURGICAL HISTORY:     Past Surgical History:   Procedure Laterality Date    CATARACT EXTRACTION      CHOLECYSTECTOMY      COLONOSCOPY      ESOPHAGOGASTRODUODENOSCOPY      HYSTERECTOMY      LUMBAR SPINE SURGERY      ROTATOR CUFF REPAIR Bilateral     total of 4 surgeries    THYROIDECTOMY      TOTAL HIP ARTHROPLASTY Right     TOTAL KNEE ARTHROPLASTY Right     c       ALLERGIES:   Azithromycin and Levofloxacin    FAMILY HISTORY:   Lung cancer, lupus and diabetes mellitus    SOCIAL HISTORY:   Denies tobacco and alcohol use   Uses medical marijuana    HOME MEDICATIONS:     Prior to Admission medications    Medication Sig Start Date End Date Taking? Authorizing Provider   ALPRAZolam (XANAX) 1 MG tablet Take 1 mg by mouth every evening. 10/2/23  Yes Provider, Historical   EScitalopram oxalate (LEXAPRO) 10 MG tablet Take 20 mg by mouth every evening. 6/22/23  Yes Provider, Historical   levothyroxine (SYNTHROID) 100 MCG tablet Take 100 mcg by mouth once daily. 6/2/23  Yes Provider, Historical   liothyronine (CYTOMEL) 5 MCG Tab Take 5 mcg by mouth every morning. 6/5/23  Yes Provider, Historical   losartan (COZAAR) 25 MG tablet Take 25 mg by mouth every evening. 6/13/23  Yes Provider, Historical   mv-min/iron/folic/calcium/vitK (WOMEN'S 50 PLUS MULTIVIT-IRON ORAL) Take 1 tablet by mouth once daily.   Yes Provider, Historical   rosuvastatin (CRESTOR) 20 MG tablet Take 20 mg by mouth once daily. 6/22/23  Yes Provider, Historical   sumatriptan (IMITREX) 25 MG Tab Take 1 tablet by mouth daily as needed for Migraine. 10/2/23  Yes Provider, Historical   enoxaparin (LOVENOX) 100 mg/mL Syrg Inject 100 mg into the skin every 12 (twelve) hours.  10/27/23 Yes Provider, Historical   estradioL (ESTRACE) 1 MG  tablet Take 1 mg by mouth once daily. 6/6/23 10/27/23 Yes Provider, Historical   oxyCODONE-acetaminophen (PERCOCET)  mg per tablet Take 1 tablet by mouth every 6 (six) hours as needed. 10/13/23 10/27/23 Yes Provider, Historical   EPINEPHrine (EPIPEN) 0.3 mg/0.3 mL AtIn ADMINISTER 1 PEN INJECTOR IN THE MUSCLE AS NEEDED FOR ANAPHYLAXIS 7/17/23   Provider, Historical   methocarbamoL (ROBAXIN) 750 MG Tab 1 po tid As needed for spasms 10/27/23   Curtis Corona MD   oxyCODONE-acetaminophen (PERCOCET)  mg per tablet Take 1 tablet by mouth every 4 (four) hours as needed for Pain. 10/27/23 11/3/23  Curtis Corona MD   promethazine (PHENERGAN) 25 MG tablet Take 25 mg by mouth every 6 (six) hours as needed for Nausea. 8/1/23   Provider, Historical   pseudoephedrine (SUDAFED) 120 mg 12 hr tablet Take 120 mg by mouth 2 (two) times daily as needed for Congestion.    Provider, Historical   rivaroxaban (XARELTO) 20 mg Tab Take 1/2 tab po qd 10/27/23   Curtis Corona MD   rivaroxaban (XARELTO) 10 mg Tab Take 1 tablet (10 mg total) by mouth 2 (two) times daily with meals. Begin Saturday, 10/28/2023 10/25/23 10/27/23  Curtis Corona MD       REVIEW OF SYSTEMS:   Except as documented, all other systems reviewed and negative     PHYSICAL EXAM:     VITAL SIGNS: 24 HRS MIN & MAX LAST   Temp  Min: 98.6 °F (37 °C)  Max: 98.9 °F (37.2 °C) 98.7 °F (37.1 °C)   BP  Min: 155/98  Max: 211/113 (!) 181/83   Pulse  Min: 72  Max: 91  80   Resp  Min: 10  Max: 21 18   SpO2  Min: 94 %  Max: 100 % 99 %       General appearance: Well-developed, well-nourished female in no apparent distress.  Family at bedside.  HEENT: Atraumatic head. Moist mucous membranes of oral cavity.  Lungs: Clear to auscultation bilaterally.  On 2 L O2 via nasal cannula.  Heart: Regular rate and rhythm.   Abdomen: Soft, non-distended, non-tender. Bowel sounds are normal.   Extremities: No cyanosis, clubbing.  Limited motion brace to left lower  extremity  Skin: No Rash. Warm and dry.  Neuro: Awake, alert and oriented. Motor and sensory exams grossly intact.  Psych/mental status:  Agitated. Cooperative. Responds appropriately to questions.       LABS AND IMAGING:     Recent Labs   Lab 10/25/23  1626   WBC 7.38   RBC 3.91*   HGB 11.3*   HCT 34.7*   MCV 88.7   MCH 28.9   MCHC 32.6   RDW 13.4      MPV 9.4       Recent Labs   Lab 10/25/23  1626      K 4.1   CO2 30   BUN 14.0   CREATININE 0.94   CALCIUM 9.4   ALBUMIN 4.7   ALKPHOS 86   ALT 23   AST 37*   BILITOT 0.7       Microbiology Results (last 7 days)       ** No results found for the last 168 hours. **             X-Ray Knee 1 or 2 View Left  Narrative: EXAMINATION:  XR KNEE 1 OR 2 VIEW LEFT    CLINICAL HISTORY:  post op;    COMPARISON:  20 October 2023    FINDINGS:  Two views of the left knee demonstrate expected changes of recent ORIF of the patella with some air and fluid in the joint.  Impression: As above.    Electronically signed by: Germain Vaca  Date:    10/27/2023  Time:    10:22  SURG FL Surgery Fluoro Usage  See OP Notes for results.     IMPRESSION: See OP Notes for results.     This procedure was auto-finalized by: Virtual Radiologist        ASSESSMENT & PLAN:   Assessment:  Hypertension  Anxiety/depression  Left patellar fracture status post ORIF on 10/27/2023  Left lower extremity DVT 10/25/2023  History of hypertension hyperlipidemia, lupus, CVA, migraine headaches, fibromyalgia, depression/anxiety    Plan:  - Will resume home medications   - CBC ordered  - Full-dose Lovenox tonight followed by Xarelto tomorrow per ortho recommendations   - Case management consulted for further help with Xarelto coverage  - Pain management per Orthopedic team  - Planning for discharge tomorrow AM after ortho rounds       VTE Prophylaxis: will be placed on Lovenox for DVT prophylaxis and will be advised to be as mobile as possible and sit in a chair as  tolerated      __________________________________________________________________________  INPATIENT LIST OF MEDICATIONS     Current Facility-Administered Medications:     ALPRAZolam tablet 1 mg, 1 mg, Oral, QHS, Laureano Mei MD    atorvastatin tablet 40 mg, 40 mg, Oral, QHS, Laureano Mei MD    EScitalopram oxalate tablet 20 mg, 20 mg, Oral, QHS, Laurenao Mei MD    hydrALAZINE injection 5 mg, 5 mg, Intravenous, PRN, Shahid Osborne MD, 5 mg at 10/27/23 1010    HYDROmorphone (PF) injection 0.4 mg, 0.4 mg, Intravenous, Q5 Min PRN, Shahid Osborne MD, 0.2 mg at 10/27/23 1333    HYDROmorphone (PF) injection 2 mg, 2 mg, Intravenous, Once, Luis Daniel Montague Jr., MD    labetaloL injection 5 mg, 5 mg, Intravenous, PRN, Shahid Osborne MD, 5 mg at 10/27/23 1015    [START ON 10/28/2023] levothyroxine tablet 100 mcg, 100 mcg, Oral, Daily, Laureano Mei MD    [START ON 10/28/2023] liothyronine tablet 5 mcg, 5 mcg, Oral, QAM, Laureano Mei MD    losartan tablet 25 mg, 25 mg, Oral, QHS, Laureano Mei MD    morphine injection 4 mg, 4 mg, Intravenous, Q3H PRN, Curtis Corona MD    morphine injection 4 mg, 4 mg, Intravenous, Q6H PRN, Curtis Corona MD    mupirocin 2 % ointment, , Nasal, On Call Procedure, Curtis Corona MD    ondansetron injection 4 mg, 4 mg, Intravenous, Once PRN, Shahid Osborne MD    oxyCODONE-acetaminophen  mg per tablet 1 tablet, 1 tablet, Oral, Q4H PRN, Curtis Corona MD, 1 tablet at 10/27/23 1546    oxyCODONE-acetaminophen 5-325 mg per tablet 1 tablet, 1 tablet, Oral, Q4H PRN, Curtis Corona MD    prochlorperazine injection Soln 5 mg, 5 mg, Intravenous, Q30 Min PRN, Shahid Osborne MD    sodium chloride 0.9% flush 10 mL, 10 mL, Intravenous, PRN, Shahid Osborne MD    sumatriptan tablet 25 mg, 25 mg, Oral, Daily PRN, Laureano Mei MD      Scheduled Meds:   ALPRAZolam  1 mg Oral QHS    atorvastatin  40 mg Oral QHS    EScitalopram oxalate  20 mg Oral QHS    HYDROmorphone (PF)  2 mg  Intravenous Once    [START ON 10/28/2023] levothyroxine  100 mcg Oral Daily    [START ON 10/28/2023] liothyronine  5 mcg Oral QAM    losartan  25 mg Oral QHS     Continuous Infusions:  PRN Meds:.hydrALAZINE, HYDROmorphone, labetaloL, morphine, morphine, mupirocin, ondansetron, oxyCODONE-acetaminophen, oxyCODONE-acetaminophen, prochlorperazine, sodium chloride 0.9%, sumatriptan      Discharge Planning and Disposition: Anticipated discharge to be determined.    ICharline PA, have reviewed and discussed the case with Dr. Laureano Mei.    Please see the following addendum for further assessment and plan from there attending MD.    Charline Bolanos PA-C  10/27/2023

## 2023-10-27 NOTE — ANESTHESIA PROCEDURE NOTES
Intubation    Date/Time: 10/27/2023 7:18 AM    Performed by: Analilia Godwin  Authorized by: Shahid Osborne MD    Intubation:     Induction:  Intravenous    Intubated:  Postinduction    Mask Ventilation:  Easy with oral airway    Attempts:  1    Attempted By:  Student    Method of Intubation:  Direct    Blade:  Pop 2    Laryngeal View Grade: Grade IIA - cords partially seen      Difficult Airway Encountered?: No      Complications:  None    Airway Device:  Oral endotracheal tube    Airway Device Size:  7.5    Style/Cuff Inflation:  Cuffed    Inflation Amount (mL):  8    Tube secured:  21    Secured at:  The lips    Placement Verified By:  Capnometry    Complicating Factors:  None    Findings Post-Intubation:  BS equal bilateral and atraumatic/condition of teeth unchanged

## 2023-10-27 NOTE — PROGRESS NOTES
Pt Hx and procedure discussed in detail. Post op orders reviewed. Pt attached to v/s machine and vitals reviewed. Procedure site, IV, and Prevena vac assessed and intact. Everyone understands pt info with no further questions.

## 2023-10-27 NOTE — PROGRESS NOTES
Discussed patient's fracture and current current condition with our anesthesiologist.  She was diagnosed with DVT prior to referral to me by an ultrasound that was ordered by her initial treating physician.  Treatment recommendations were made by cardiology including weight based Lovenox prior to surgery followed by 10 mg Xarelto p.o. b.i.d. postop.  Together we had a discussion with the patient regarding the serious health risks of PE given her current condition.  This is not elective surgery, she is already 2 weeks out from a comminuted displaced patella fracture that will not heal in a functional position we do not perform an operation to correct alignment and stabilize the fracture.  She is currently asymptomatic in his on appropriate anticoagulation.  She has no prior history of DVT or PE.  We will plan to proceed with operative stabilization of her patella fracture as planned.  She will begin her Xarelto tomorrow.  After a long discussion with the patient and her  they understand and agree and accept these risks and elect to go forward with surgery today.    This note/OR report was created with the assistance of  voice recognition software or phone  dictation.  There may be transcription errors as a result of using this technology however minimal. Effort has been made to assure accuracy of transcription but any obvious errors or omissions should be clarified with the author of the document.         Curtis Corona MD  Orthopedic Trauma  [facility]

## 2023-10-27 NOTE — TRANSFER OF CARE
"Anesthesia Transfer of Care Note    Patient: Briseyda Torres    Procedure(s) Performed: Procedure(s) (LRB):  ORIF, FRACTURE, PATELLA, LEFT (Left)    Patient location: PACU    Anesthesia Type: general    Transport from OR: Transported from OR on room air with adequate spontaneous ventilation    Post pain: adequate analgesia    Post assessment: no apparent anesthetic complications and tolerated procedure well    Post vital signs: stable    Level of consciousness: awake and alert    Nausea/Vomiting: no nausea/vomiting    Complications: none    Transfer of care protocol was followed      Last vitals:   Visit Vitals  BP (!) 183/91   Pulse 72   Temp 37.2 °C (98.9 °F) (Oral)   Resp 16   Ht 5' 6" (1.676 m)   Wt 105 kg (231 lb 7.7 oz)   SpO2 99%   Breastfeeding No   BMI 37.36 kg/m²     "

## 2023-10-27 NOTE — OP NOTE
[facility]                       [address] [id number]    PATIENT NAME:      ROSY ASHRAF  YOB: 1960  CSN:               [id number]  MRN:               [id number]  ADMIT DATE:        10/27/2023 05:07:00  PHYSICIAN:         Curtis Corona MD                          OPERATIVE REPORT      DATE OF SURGERY:    10/27/2023 00:00:00    SURGEON:  Curtis Corona MD    PREOPERATIVE DIAGNOSIS:  Left comminuted patella fracture.    POSTOPERATIVE DIAGNOSIS:  Left comminuted patella fracture.    PROCEDURE:  Open reduction, internal fixation of left comminuted patella   fracture.    ANESTHESIA:  General.    ESTIMATED BLOOD LOSS:  100 mL.    IMPLANTS:  Synthes 4.0 mm continuous compression headless screw x1, 3.5 mm   headless compression screw x2 as well as 2.7 VA anterior patella plate with 3   tabs using 2.7 locking screws as well as an 18-gauge wire for cerclage fixation   of the main fracture fragments.    COMPLICATIONS:  None.    COUNTS:  All counts correct x2 at the end of the case.    INDICATIONS FOR PROCEDURE:  The patient is a 63-year-old female, who had a fall   over 2 weeks ago.  She sustained a left comminuted patella fracture with   displacement of the proximal pole.  She has a large fragment superiorly,   comminuted inferior fragments as well as a large medial fragment.  She was   referred to me for management by an outside physician due to the complex nature   of her injury from my trauma specialty services.  Three days ago she was   diagnosed with a DVT on ultrasound after being seen in his office with noted   swelling in the limb.  She was started on weight based Lovenox per her   cardiologist recommendations with plans for transition to Xarelto 20 mg a day.    Postop, she is currently asymptomatic.  Her soft tissues are amenable to   operative intervention and after discussion about the risks  and benefits of   treatment, she was brought to the operating room today.    PROCEDURE IN DETAIL:  After informed consent was obtained, the patient was met   in the outpatient surgery area.  She was brought to the operating room, placed   supine on the operating table.  General anesthesia was induced.  All bony   prominences were well padded.  Preoperative antibiotics were given.  The left   lower extremity was prepped and draped in a standard sterile fashion.  A   time-out was done indicating correct operative limb and procedure.  An anterior   approach to the knee was performed, was carried down to the level of the   fracture site.  The 3 major fracture lines were identified.  They were cleaned.    She had the abundance of hematoma in the knee that was evacuated.  We were able   to mobilize the fragments well.  Using multiple pointed reduction clamps and   provisional K-wires, we were able to attach the fracture fragments from the   superior pole down into the inferior pole as well as from the inferior to the   medial segment and superior to medial segment.  Cannulated screws were then used   for fixation and a single cerclage was used from the superior pole to the   inferior pole through the screw with an 18-gauge wire.  The locking plate was   then contoured and placed anteriorly.  Multiple screws were then placed from the   superior down to the inferior pole of the patella with multiple other   peripheral screws for additional support.  A cerclage with a #5 FiberWire was   then used around the patella in a pursestring fashion, repairing the retinaculum   tear along the lateral border.  She was put through a range of motion.  She had   no gapping of the fracture site from 0-45 degrees.  No further range of motion   was attempted.  The wound was thoroughly irrigated.  A gram of vancomycin was   placed deep within the wound.  A layered closure was performed with #1 Vicryl,   2-0 Monocryl, and staples.  Prevena  wound VAC was applied along with her hinged   knee brace locked in full extension.  She was awakened, extubated, and taken to   recovery in stable condition.    POSTOPERATIVE PLAN:  She will be discharged home today.  She can weightbear as   tolerated to the left lower extremity with her knee in the immobilizer out   straight, as she has been doing for the last couple of weeks.  They will   continue to monitor her dressings and her wound VAC and remove it in 5 days and   replace it with dry dressings.  She will begin her Xarelto for her DVT treatment   starting tomorrow and follow up with her cardiologist for management.  She and   her  understand and agree with our plan today as we discussed.  We will   see her back in 2 weeks for wound evaluation, removal of her staples.  At that   time, we will begin progressive PT protocol for patella fracture fixation.      ______________________________  MD JUSTINO Horan/SALLY  DD:  10/27/2023  Time:  09:46AM  DT:  10/27/2023  Time:  10:38AM  Job #:  [id number]      OPERATIVE REPORT

## 2023-10-27 NOTE — DISCHARGE INSTRUCTIONS
-NO driving and NO alcohol consumption for 24 hours and while taking narcotic pain medication.    -Follow up appointment scheduled for: November 9, 2023 @ 2:45 pm.    -Wound care: In 5 days, remove Provena wound vac and start changing dressing once daily using dry gauze and ace wrap.    -Monitor for signs of INFECTION: redness, swelling, drainage/pus/foul odor, fever, chills. Also, monitor extremity for good circulation (pink, warm, etc)    -Weight bearing status: weight bearing as tolerated in full extension. Keep knee brace in place at all times. No range of motion of knee.    -Apply ICE and ELEVATE extremity as needed to aid in pain and inflammation.    -Report to your nearest ER/Notify your doctor if you experience any SUDDEN/SEVERE chest pain/abdominal pain, weakness, trouble breathing, uncontrolled pain.

## 2023-10-28 PROCEDURE — 25000003 PHARM REV CODE 250: Performed by: INTERNAL MEDICINE

## 2023-10-28 PROCEDURE — G0378 HOSPITAL OBSERVATION PER HR: HCPCS

## 2023-10-28 PROCEDURE — 63600175 PHARM REV CODE 636 W HCPCS: Performed by: ORTHOPAEDIC SURGERY

## 2023-10-28 PROCEDURE — 94761 N-INVAS EAR/PLS OXIMETRY MLT: CPT

## 2023-10-28 PROCEDURE — 27000221 HC OXYGEN, UP TO 24 HOURS

## 2023-10-28 RX ORDER — HYDROCODONE BITARTRATE AND ACETAMINOPHEN 10; 325 MG/1; MG/1
1 TABLET ORAL EVERY 6 HOURS PRN
Status: DISCONTINUED | OUTPATIENT
Start: 2023-10-28 | End: 2023-10-29

## 2023-10-28 RX ADMIN — GABAPENTIN 300 MG: 300 CAPSULE ORAL at 10:10

## 2023-10-28 RX ADMIN — KETOROLAC TROMETHAMINE 30 MG: 30 INJECTION, SOLUTION INTRAMUSCULAR at 05:10

## 2023-10-28 RX ADMIN — LOSARTAN POTASSIUM 25 MG: 25 TABLET, FILM COATED ORAL at 08:10

## 2023-10-28 RX ADMIN — GABAPENTIN 300 MG: 300 CAPSULE ORAL at 02:10

## 2023-10-28 RX ADMIN — ESCITALOPRAM OXALATE 20 MG: 10 TABLET ORAL at 08:10

## 2023-10-28 RX ADMIN — MORPHINE SULFATE 4 MG: 10 INJECTION, SOLUTION INTRAMUSCULAR; INTRAVENOUS at 12:10

## 2023-10-28 RX ADMIN — MORPHINE SULFATE 4 MG: 10 INJECTION, SOLUTION INTRAMUSCULAR; INTRAVENOUS at 05:10

## 2023-10-28 RX ADMIN — ACETAMINOPHEN 1000 MG: 10 INJECTION, SOLUTION INTRAVENOUS at 12:10

## 2023-10-28 RX ADMIN — LEVOTHYROXINE SODIUM 100 MCG: 100 TABLET ORAL at 10:10

## 2023-10-28 RX ADMIN — RIVAROXABAN 15 MG: 15 TABLET, FILM COATED ORAL at 05:10

## 2023-10-28 RX ADMIN — METHOCARBAMOL 750 MG: 750 TABLET ORAL at 02:10

## 2023-10-28 RX ADMIN — HYDROCODONE BITARTRATE AND ACETAMINOPHEN 1 TABLET: 10; 325 TABLET ORAL at 06:10

## 2023-10-28 RX ADMIN — ATORVASTATIN CALCIUM 40 MG: 40 TABLET, FILM COATED ORAL at 08:10

## 2023-10-28 RX ADMIN — METHOCARBAMOL 750 MG: 750 TABLET ORAL at 05:10

## 2023-10-28 RX ADMIN — GABAPENTIN 300 MG: 300 CAPSULE ORAL at 05:10

## 2023-10-28 RX ADMIN — METHOCARBAMOL 750 MG: 750 TABLET ORAL at 10:10

## 2023-10-28 RX ADMIN — RIVAROXABAN 15 MG: 15 TABLET, FILM COATED ORAL at 07:10

## 2023-10-28 RX ADMIN — ACETAMINOPHEN 1000 MG: 10 INJECTION, SOLUTION INTRAVENOUS at 05:10

## 2023-10-28 RX ADMIN — LIOTHYRONINE SODIUM 5 MCG: 5 TABLET ORAL at 07:10

## 2023-10-28 RX ADMIN — ALPRAZOLAM 1 MG: 0.5 TABLET ORAL at 08:10

## 2023-10-28 NOTE — NURSING
Nurses Note -- 4 Eyes      10/28/2023   6:27 AM      Skin assessed during: Admit      [x] No Altered Skin Integrity Present    []Prevention Measures Documented      [] Yes- Altered Skin Integrity Present or Discovered   [] LDA Added if Not in Epic (Describe Wound)   [] New Altered Skin Integrity was Present on Admit and Documented in LDA   [] Wound Image Taken    Wound Care Consulted? No    Attending Nurse:  Jorge Meza RN/Staff Member:   Taylor NOBLE

## 2023-10-28 NOTE — PLAN OF CARE
Problem: Adult Inpatient Plan of Care  Goal: Plan of Care Review  Outcome: Ongoing, Progressing  Flowsheets (Taken 10/28/2023 0055)  Plan of Care Reviewed With: patient  Goal: Patient-Specific Goal (Individualized)  Outcome: Ongoing, Progressing  Flowsheets (Taken 10/28/2023 0055)  Anxieties, Fears or Concerns: Blood clot dissolving  Individualized Care Needs: pain control  Goal: Absence of Hospital-Acquired Illness or Injury  Outcome: Ongoing, Progressing  Goal: Optimal Comfort and Wellbeing  Outcome: Ongoing, Progressing  Goal: Readiness for Transition of Care  Outcome: Ongoing, Progressing

## 2023-10-29 VITALS
OXYGEN SATURATION: 98 % | BODY MASS INDEX: 37.21 KG/M2 | HEART RATE: 104 BPM | RESPIRATION RATE: 20 BRPM | DIASTOLIC BLOOD PRESSURE: 79 MMHG | WEIGHT: 231.5 LBS | SYSTOLIC BLOOD PRESSURE: 134 MMHG | TEMPERATURE: 98 F | HEIGHT: 66 IN

## 2023-10-29 PROCEDURE — 25000003 PHARM REV CODE 250: Performed by: INTERNAL MEDICINE

## 2023-10-29 PROCEDURE — G0378 HOSPITAL OBSERVATION PER HR: HCPCS

## 2023-10-29 PROCEDURE — 27000221 HC OXYGEN, UP TO 24 HOURS

## 2023-10-29 PROCEDURE — 63600175 PHARM REV CODE 636 W HCPCS: Performed by: INTERNAL MEDICINE

## 2023-10-29 PROCEDURE — 94761 N-INVAS EAR/PLS OXIMETRY MLT: CPT

## 2023-10-29 RX ORDER — OXYCODONE AND ACETAMINOPHEN 10; 325 MG/1; MG/1
1 TABLET ORAL EVERY 6 HOURS PRN
Qty: 20 TABLET | Refills: 0 | Status: SHIPPED | OUTPATIENT
Start: 2023-10-29 | End: 2023-11-16 | Stop reason: SDUPTHER

## 2023-10-29 RX ORDER — KETOROLAC TROMETHAMINE 30 MG/ML
30 INJECTION, SOLUTION INTRAMUSCULAR; INTRAVENOUS ONCE
Status: COMPLETED | OUTPATIENT
Start: 2023-10-29 | End: 2023-10-29

## 2023-10-29 RX ORDER — KETOROLAC TROMETHAMINE 10 MG/1
10 TABLET, FILM COATED ORAL EVERY 6 HOURS PRN
Status: DISCONTINUED | OUTPATIENT
Start: 2023-10-29 | End: 2023-10-29 | Stop reason: HOSPADM

## 2023-10-29 RX ORDER — KETOROLAC TROMETHAMINE 10 MG/1
10 TABLET, FILM COATED ORAL EVERY 6 HOURS PRN
Qty: 15 TABLET | Refills: 0 | Status: SHIPPED | OUTPATIENT
Start: 2023-10-29 | End: 2023-11-03

## 2023-10-29 RX ORDER — OXYCODONE AND ACETAMINOPHEN 10; 325 MG/1; MG/1
1 TABLET ORAL EVERY 6 HOURS PRN
Status: DISCONTINUED | OUTPATIENT
Start: 2023-10-29 | End: 2023-10-29 | Stop reason: HOSPADM

## 2023-10-29 RX ADMIN — METHOCARBAMOL 750 MG: 750 TABLET ORAL at 02:10

## 2023-10-29 RX ADMIN — RIVAROXABAN 15 MG: 15 TABLET, FILM COATED ORAL at 04:10

## 2023-10-29 RX ADMIN — KETOROLAC TROMETHAMINE 30 MG: 30 INJECTION, SOLUTION INTRAMUSCULAR; INTRAVENOUS at 10:10

## 2023-10-29 RX ADMIN — RIVAROXABAN 15 MG: 15 TABLET, FILM COATED ORAL at 09:10

## 2023-10-29 RX ADMIN — LIOTHYRONINE SODIUM 5 MCG: 5 TABLET ORAL at 06:10

## 2023-10-29 RX ADMIN — LEVOTHYROXINE SODIUM 100 MCG: 100 TABLET ORAL at 09:10

## 2023-10-29 RX ADMIN — HYDROCODONE BITARTRATE AND ACETAMINOPHEN 1 TABLET: 10; 325 TABLET ORAL at 06:10

## 2023-10-29 RX ADMIN — METHOCARBAMOL 750 MG: 750 TABLET ORAL at 09:10

## 2023-10-29 RX ADMIN — GABAPENTIN 300 MG: 300 CAPSULE ORAL at 09:10

## 2023-10-29 RX ADMIN — HYDROCODONE BITARTRATE AND ACETAMINOPHEN 1 TABLET: 10; 325 TABLET ORAL at 12:10

## 2023-10-29 RX ADMIN — GABAPENTIN 300 MG: 300 CAPSULE ORAL at 02:10

## 2023-10-29 NOTE — PROGRESS NOTES
"Hospital Medicine  Progress Note    Patient Name: Briseyda Torres  MRN: [id number]  Status: OP- Observation   Admission Date: 10/27/2023  Length of Stay: 0  Date of Service: 10/28/2023       CC: hospital follow-up for DVT       SUBJECTIVE   63 y.o. AAF with a recent fall resulting in displaced comminuted left patella 10/12.  She has been NWB managed by orthopedics in [place], but in the process of being referred to Dr Corona here for repair.  However in the interim she developed LLE DVT 10/25, given FD Lovenox injections pending surgery, with plan to start Xarelto post-operatively.  However patient unable to afford Xarelto.  She now underwent ORIF of a left patella here with Dr. Corona today on 10/27.  Hospital medicine services consulted for admission.     Today: Patient seen and examined at bedside, and chart reviewed.  Stable post-op, now on Xarelto.        MEDICATIONS   Scheduled   ALPRAZolam  1 mg Oral QHS    atorvastatin  40 mg Oral QHS    EScitalopram oxalate  20 mg Oral QHS    gabapentin  300 mg Oral TID    levothyroxine  100 mcg Oral Daily    liothyronine  5 mcg Oral QAM    losartan  25 mg Oral QHS    methocarbamoL  750 mg Oral Q8H    rivaroxaban  15 mg Oral BID WM     Continuous Infusions  None      PHYSICAL EXAM   VITALS: T 99.3 °F (37.4 °C)   BP (!) 187/81   P 83   RR 18   O2 98 %    GENERAL: Awake and in NAD  LUNGS: CTA B/L  CVS: Normal rate  GI/: Soft, NT, bowel sounds positive.  EXTREMITIES: No peripheral edema  NEURO: AAOx3  PSYCH: Cooperative      LABS   CBC  Recent Labs     10/27/23  2149   WBC 9.78   RBC 3.81*   HGB 11.1*   HCT 33.7*   MCV 88.5   MCH 29.1   MCHC 32.9*   RDW 13.6        CHEM  No results for input(s): "NA", "K", "CHLORIDE", "CO2", "BUN", "CREATININE", "GLUCOSE", "CALCIUM", "MG", "PHOS", "ALBUMIN", "GLOBULIN", "ALKPHOS", "ALT", "AST", "BILITOT", "LIPASE", "CRP", "LDH", "HAPTOGLOBIN", "FERRITIN", "IRON", "TIBC", "TSH", "FREET4" in the last 72 hours.      ASSESSMENT "   Left patellar fracture status post ORIF on 10/27/2023  Left lower extremity DVT  Essential Hypertension  h/o Anxiety/depression      PLAN   Post-op care per Ortho, WBAT  Continue JASON David to work on medication assistance  Otherwise continue current management and monitoring in the interim        Prophylaxis: Xarelto as above        Phoenix Garcia MD  [facility]

## 2023-10-29 NOTE — PROGRESS NOTES
[facility]  Orthopedics  Progress Note    Patient Name: Briseyda Torres  MRN: [id number]  Admission Date: 10/27/2023  Hospital Length of Stay: 0 days  Attending Provider: Phoenix Garcia MD  Primary Care Provider: Deo Jacobs MD  Follow-up For: Procedure(s) (LRB):  ORIF, FRACTURE, PATELLA, LEFT (Left)    Post-Operative Day: 2 Days Post-Op  Subjective:     Principal Problem:Closed displaced comminuted fracture of left patella    Principal Orthopedic Problem: 2 Days Post-Op   Left patella fracture   Left lower extremity DVT    Interval History:  63-year-old female is S/P left patella fracture ORIF postop day 2. She was diagnosed with left lower extremity DVT prior to her operation.  She is currently on Xarelto.  She is resting in bed, pain is controlled currently.  Nursing reports some increased pain last night but improved with Toradol.  She is pending discharge home, waiting for clarification on the price of Xarelto and if she can afford this or not.  Case management has been notified to assist    Review of patient's allergies indicates:   Allergen Reactions    Azithromycin     Levofloxacin        Current Facility-Administered Medications   Medication    ALPRAZolam tablet 1 mg    atorvastatin tablet 40 mg    EScitalopram oxalate tablet 20 mg    gabapentin capsule 300 mg    ketorolac tablet 10 mg    levothyroxine tablet 100 mcg    liothyronine tablet 5 mcg    losartan tablet 25 mg    methocarbamoL tablet 750 mg    oxyCODONE-acetaminophen  mg per tablet 1 tablet    rivaroxaban tablet 15 mg    sumatriptan tablet 25 mg     Objective:     Vital Signs (Most Recent):  Temp: 99.5 °F (37.5 °C) (10/29/23 0817)  Pulse: 91 (10/29/23 0817)  Resp: 18 (10/29/23 0817)  BP: (!) 168/89 (10/29/23 0817)  SpO2: 98 % (10/29/23 1024) Vital Signs (24h Range):  Temp:  [98.1 °F (36.7 °C)-99.9 °F (37.7 °C)] 99.5 °F (37.5 °C)  Pulse:  [83-99] 91  Resp:  [18-19] 18  SpO2:  [94 %-98 %] 98 %  BP:  "(137-187)/(68-89) 168/89     Weight: 105 kg (231 lb 7.7 oz)  Height: 5' 6" (167.6 cm)  Body mass index is 37.36 kg/m².      Intake/Output Summary (Last 24 hours) at 10/29/2023 1154  Last data filed at 10/29/2023 0500  Gross per 24 hour   Intake 720 ml   Output 1725 ml   Net -1005 ml       Physical Exam:   Ortho/SPM Exam  General: awake, alert, oriented. Skin pink, warm, dry. Resp even and non labored. No distress noted.     Left lower extremity exam   Surgical bandages clean dry and intact   Thigh and calf compartments soft and compressible   Knee brace in extension  Prevena Hemovac in place with less than 10 cc of bloody output   Intact dorsiflexion, plantar flexion of the left ankle   Full digital range of motion   Sensation intact distally  Pedal pulses 2+      Diagnostic Findings:   Significant Labs:   Recent Lab Results         10/27/23  2149   10/27/23  0543        Baso # 0.01         Basophil % 0.1         Eos # 0.00         Eosinophil % 0.0         Hematocrit 33.7         Hemoglobin 11.1         Immature Grans (Abs) 0.04         Immature Granulocytes 0.4         INR   1.1       Lymph # 1.19         LYMPH % 12.2         MCH 29.1         MCHC 32.9         MCV 88.5         Mono # 0.53         Mono % 5.4         MPV 10.6         Neut # 8.01         Neut % 81.9         nRBC 0.0         Platelet Count 197         Protime   14.2       RBC 3.81         RDW 13.6         WBC 9.78                  Significant Imaging: None     Assessment/Plan:       Orthopedically stable.  Pain is controlled.  She will be weight-bearing as tolerated with her brace locked in extension.  No range of motion of the left knee.  Prevena wound VAC for 5 days then switch dry gauze dressings.  Xarelto for her current DVT.  Awaiting case management assistance with placing of Xarelto upon discharge.  Follow-up with Dr. Corona in 2 weeks for repeat evaluation  Active Diagnoses:    Diagnosis Date Noted POA    PRINCIPAL PROBLEM:  Closed displaced " comminuted fracture of left patella [S82.042A] 10/27/2023 Yes    Acute deep vein thrombosis (DVT) of left lower extremity [I82.402] 10/27/2023 Yes      Problems Resolved During this Admission:

## 2023-10-30 ENCOUNTER — PATIENT OUTREACH (OUTPATIENT)
Dept: ADMINISTRATIVE | Facility: CLINIC | Age: 63
End: 2023-10-30
Payer: MEDICARE

## 2023-10-30 DIAGNOSIS — I82.4Z2 ACUTE DEEP VEIN THROMBOSIS (DVT) OF DISTAL VEIN OF LEFT LOWER EXTREMITY: Primary | ICD-10-CM

## 2023-10-30 NOTE — PROGRESS NOTES
C3 nurse spoke with Briseyda Torres for a TCC post hospital discharge follow up call. The patient has a scheduled HOSFU appointment with Curtis Corona MD (orthopedic surgery) 11/9/23 @ 2:45pm. PCP is working on getting her Xarelto covered by insurance, and called her today to discuss Home health, and it will be ordered.       Patient has not been able to fill Xarelto, as insurance will not cover it, and OOP is $1300. She has another pharmacy working on it with a coupon to see if they can get it filled for a reasonable price.

## 2023-10-30 NOTE — TELEPHONE ENCOUNTER
Spoke to pharmicist.  She will fill xarelto rx 15mg bid for 21 days as prescribed for starter pack.  She will contact cardiologist Dr. Wong Villasenor to confirm, and get further orders.

## 2023-10-31 NOTE — ANESTHESIA POSTPROCEDURE EVALUATION
Anesthesia Post Evaluation    Patient: Briseyda Torres    Procedure(s) Performed: Procedure(s) (LRB):  ORIF, FRACTURE, PATELLA, LEFT (Left)    Final Anesthesia Type: general      Patient location during evaluation: PACU  Patient participation: Yes- Able to Participate  Level of consciousness: awake and alert and oriented  Post-procedure vital signs: reviewed and stable  Pain management: adequate  Airway patency: patent  SHELLIE mitigation strategies: Verification of full reversal of neuromuscular block  PONV status at discharge: No PONV  Anesthetic complications: no      Cardiovascular status: blood pressure returned to baseline and stable  Respiratory status: spontaneous ventilation and unassisted  Hydration status: euvolemic  Follow-up not needed.  Comments: [facility]          Vitals Value Taken Time   /70 10/27/23 2034   Temp 37.3 °C (99.1 °F) 10/27/23 2015   Pulse 84 10/27/23 2034   Resp 18 10/27/23 1546   SpO2 100 % 10/27/23 2034         Event Time   Out of Recovery 10:43:03         Pain/Delma Score: Pain Rating Prior to Med Admin: 9 (10/29/2023 10:07 AM)  Pain Rating Post Med Admin: 2 (10/29/2023 10:13 AM)

## 2023-10-31 NOTE — DISCHARGE SUMMARY
Hospital Medicine  Discharge Summary    Patient Name: Briseyda Torres  MRN: [id number]  Admit Date: 10/27/2023  Discharge Date: 10/29/2023   Status: OP- Observation   Length of Stay: 0      PHYSICIANS   Admitting Physician: Laureano Mei MD  Discharging Physician: Phoenix Garcia MD.  Primary Care Physician: Deo Jacobs MD  Consults: Orthopedic Surgery      DISCHARGE DIAGNOSES   Left patellar fracture status post ORIF on 10/27/2023  Left lower extremity DVT  Essential Hypertension  h/o Anxiety/depression      PROCEDURES   ORIF left patella - 10/27/23 - Curtis Corona MD      HOSPITAL COURSE    63 y.o. AAF with a recent fall resulting in displaced comminuted left patella 10/12.  She has been NWB managed by orthopedics in [place], but in the process of being referred to Dr Corona here for repair.  However in the interim she developed LLE DVT 10/25, given FD Lovenox injections pending surgery, with plan to start Xarelto post-operatively.  However patient unable to afford Xarelto.  She now underwent ORIF of a left patella here with Dr. Corona today on 10/27.  Hospital medicine services consulted for admission.   Pain was brought under control, and CM arranged for Xarelto assistance.      STATUS  Improved    DISPOSITION  Discharge to home    DIET  Low sodium    ACTIVITY  As tolerated  WBAT to LLE    FOLLOW-UP      Curtis Corona MD. Schedule an appointment as soon as possible for a visit in 2 week(s).    Specialty: Orthopedic Surgery  Contact information:  [address] [place] [address] [phone]               Deo Jacobs MD. Schedule an appointment as soon as possible for a visit in 1 week(s).    Specialty: Family Medicine  Why: Discharge Follow-up  Contact information:  [address]  [address]  [facility] [address] [phone]                 DISCHARGE MEDICATION RECONCILIATION     PRESCRIBED     ketorolac 10 mg tablet  Commonly known as: TORADOL  Take 1  tablet (10 mg total) by mouth every 6 (six) hours as needed for Pain.            CONTINUE with CHANGES     oxyCODONE-acetaminophen  mg per tablet  Commonly known as: PERCOCET  Take 1 tablet by mouth every 6 (six) hours as needed for Pain.            CONTINUE      ALPRAZolam 1 MG tablet  Commonly known as: XANAX     EPINEPHrine 0.3 mg/0.3 mL Atin  Commonly known as: EPIPEN     EScitalopram oxalate 10 MG tablet  Commonly known as: LEXAPRO     levothyroxine 100 MCG tablet  Commonly known as: SYNTHROID     liothyronine 5 MCG Tab  Commonly known as: CYTOMEL     losartan 25 MG tablet  Commonly known as: COZAAR     methocarbamoL 750 MG Tab  Commonly known as: ROBAXIN  1 po tid As needed for spasms     promethazine 25 MG tablet  Commonly known as: PHENERGAN     pseudoephedrine 120 mg 12 hr tablet  Commonly known as: SUDAFED     rosuvastatin 20 MG tablet  Commonly known as: CRESTOR     sumatriptan 25 MG Tab  Commonly known as: IMITREX     WOMEN'S 50 PLUS MULTIVIT-IRON ORAL            DISCONTINUE     LOVENOX 100 mg/mL Syrg  Generic drug: enoxaparin            These medications were sent to   [facility][id number]  [place] [id number]      Phone: [phone]   ketorolac 10 mg tablet  methocarbamoL 750 MG Tab  oxyCODONE-acetaminophen  mg per tablet        PHYSICAL EXAM   VITALS: T 98 °F (36.7 °C)   /79   P 104   RR 20   O2 98 %    GENERAL: Awake and in NAD  LUNGS: CTA B/L  CVS: Normal rate  GI/: Soft, NT, bowel sounds positive.  EXTREMITIES: No peripheral edema  NEURO: AAOx3  PSYCH: Cooperative        Discharge time: 33 minutes     Phoenix Garcia MD  [facility]       DIAGNOSITCS   CBC:   Recent Labs   Lab 10/25/23  1626 10/27/23  2149   WBC 7.38 9.78   HGB 11.3* 11.1*   HCT 34.7* 33.7*    197     COAG:  Recent Labs   Lab 10/25/23  1626 10/27/23  0543   INR 1.0 1.1     CMP:   Recent Labs   Lab 10/25/23  1626   CALCIUM 9.4   ALBUMIN 4.7      K 4.1   CO2 30   BUN  14.0   CREATININE 0.94   ALKPHOS 86   ALT 23   AST 37*   BILITOT 0.7     Estimated Creatinine Clearance: 75 mL/min (based on SCr of 0.94 mg/dL).        X-Ray Knee 1 or 2 View Left  Result Date: 10/27/2023  EXAMINATION: XR KNEE 1 OR 2 VIEW LEFT CLINICAL HISTORY: post op; COMPARISON: 20 October 2023 FINDINGS: Two views of the left knee demonstrate expected changes of recent ORIF of the patella with some air and fluid in the joint.   Impression:  As above.   Electronically signed by: Germain Vaca Date:    10/27/2023 Time:    10:22

## 2023-11-02 ENCOUNTER — PATIENT MESSAGE (OUTPATIENT)
Dept: ADMINISTRATIVE | Facility: OTHER | Age: 63
End: 2023-11-02
Payer: MEDICARE

## 2023-11-04 ENCOUNTER — NURSE TRIAGE (OUTPATIENT)
Dept: ADMINISTRATIVE | Facility: CLINIC | Age: 63
End: 2023-11-04
Payer: MEDICARE

## 2023-11-04 NOTE — PROGRESS NOTES
Patient called answering service for help with her Xarelto, which is causing nosebleeds. Instructed her is likely best to stop the medication due to daily nose bleeds and start aspirin and speak to cardiologist. She was uncomfortable with this direction and say rogerio talk to the doctor. She also has a wound VAC likely Provena it has turned off. I instructed her to please take the wound VAC off and go to a dry dressing. She was not  uncomfortable with this direction states rogerio leave it on until she talks to Dr. Corona.     Patient to go to ER if symptoms get worse or bleeding is uncontrollable.    Pj

## 2023-11-04 NOTE — TELEPHONE ENCOUNTER
"  Reason for Disposition   [1] Caller requests to speak ONLY to PCP AND [2] URGENT question    Protocols used: PCP Call - No Triage-A-    .Briseyda called to say she had ortho surgery Dr Curtis Corona, for ORIF of knee fracture 10/27.  She said there is a tube running through the incision and a "little black box that the blood circulates from", and "it looks like it is dried up, and is not making any more noise since last night".  Wants to take it off.  It is Prevena 125 for wound therapy.  Explained that they often shut off at day 7 post op, and she states she wants to speak with physician now for complete advice, as she is concerned that having "that tube come out of my incision is going to cause me problems.  There is "white stuff that looks like salt in the box".  Assured her that I will call on call with request to call her back.  Spoke with Valorie, in Dr Grover Oliva's office, as he is on call.  Provided her with the above information, and she will request Dr Oliva call Briseyda personally as soon as possible. Provided this information to Briseyda, and she will wait for the call back from Dr Oliva now.  No other questions, no triage.  Wanted to speak with provider only.  Message to Dr Oliva and Dr Corona.  Please contact caller directly with any additional care advice.    "

## 2023-11-06 ENCOUNTER — TELEPHONE (OUTPATIENT)
Dept: ORTHOPEDICS | Facility: CLINIC | Age: 63
End: 2023-11-06
Payer: MEDICARE

## 2023-11-06 NOTE — TELEPHONE ENCOUNTER
Spoke to patient, home health has been ordered by her PCP, Terri escalera,  Orders are sent to Terri for removal of wound vac. To be replaced with dry dressing.  She will also contact her PCP with questions re: nosebleeds with taking xarelto.  All questions answered.  She will call with any further needs.

## 2023-11-16 ENCOUNTER — OFFICE VISIT (OUTPATIENT)
Dept: ORTHOPEDICS | Facility: CLINIC | Age: 63
End: 2023-11-16
Payer: MEDICARE

## 2023-11-16 VITALS
DIASTOLIC BLOOD PRESSURE: 75 MMHG | HEIGHT: 66 IN | RESPIRATION RATE: 18 BRPM | SYSTOLIC BLOOD PRESSURE: 122 MMHG | BODY MASS INDEX: 37.21 KG/M2 | WEIGHT: 231.5 LBS | HEART RATE: 78 BPM

## 2023-11-16 DIAGNOSIS — S82.042A CLOSED DISPLACED COMMINUTED FRACTURE OF LEFT PATELLA, INITIAL ENCOUNTER: Primary | ICD-10-CM

## 2023-11-16 PROCEDURE — 99024 POSTOP FOLLOW-UP VISIT: CPT | Mod: POP,,, | Performed by: ORTHOPAEDIC SURGERY

## 2023-11-16 PROCEDURE — 99024 PR POST-OP FOLLOW-UP VISIT: ICD-10-PCS | Mod: POP,,, | Performed by: ORTHOPAEDIC SURGERY

## 2023-11-16 NOTE — PROGRESS NOTES
Subjective:       Patient ID: Briseyda Torres is a 63 y.o. female.    Chief Complaint   Patient presents with    Left Knee - Post-op Evaluation     3 week f/u orif left patella fx, wb with crutches, left knee brace locked in extension.          Patient is here today for follow-up evaluation 3 weeks status post open reduction internal fixation of the left patella fracture.  She has been on Xarelto for treatment left lower extremity DVT.  She is had some mild bloody drainage from her incision.  No fevers or chills.  Swelling has diminished.  She is been ambulating with her his knee brace out in full extension.  She is been compliant with her limitations in her range of motion.        Review of Systems   Constitutional: Negative for chills, fever and malaise/fatigue.   HENT:  Negative for congestion and hearing loss.    Eyes:  Negative for visual disturbance.   Cardiovascular:  Negative for chest pain and syncope.   Respiratory:  Negative for cough and shortness of breath.    Hematologic/Lymphatic: Does not bruise/bleed easily.   Skin:  Negative for color change and suspicious lesions.   Musculoskeletal:  Negative for falls and neck pain.   Gastrointestinal:  Negative for abdominal pain, nausea and vomiting.   Genitourinary:  Negative for dysuria and hematuria.   Neurological:  Negative for numbness and sensory change.   Psychiatric/Behavioral:  Negative for altered mental status. The patient is not nervous/anxious.         Current Outpatient Medications on File Prior to Visit   Medication Sig Dispense Refill    ALPRAZolam (XANAX) 1 MG tablet Take 1 mg by mouth every evening.      EPINEPHrine (EPIPEN) 0.3 mg/0.3 mL AtIn ADMINISTER 1 PEN INJECTOR IN THE MUSCLE AS NEEDED FOR ANAPHYLAXIS      EScitalopram oxalate (LEXAPRO) 10 MG tablet Take 20 mg by mouth every evening.      levothyroxine (SYNTHROID) 100 MCG tablet Take 100 mcg by mouth once daily.      liothyronine (CYTOMEL) 5 MCG Tab Take 5 mcg by mouth every  "morning.      losartan (COZAAR) 25 MG tablet Take 25 mg by mouth every evening.      methocarbamoL (ROBAXIN) 750 MG Tab 1 po tid As needed for spasms 42 tablet 0    mv-min/iron/folic/calcium/vitK (WOMEN'S 50 PLUS MULTIVIT-IRON ORAL) Take 1 tablet by mouth once daily.      promethazine (PHENERGAN) 25 MG tablet Take 25 mg by mouth every 6 (six) hours as needed for Nausea.      pseudoephedrine (SUDAFED) 120 mg 12 hr tablet Take 120 mg by mouth 2 (two) times daily as needed for Congestion.      rivaroxaban (XARELTO) 15 mg (42)- 20 mg (9) tablet dose pack Take 1 tablet (15 mg) by mouth twice daily with food for 21 days followed by 1 tablet (20 mg) by mouth once daily with food 42 tablet 0    rosuvastatin (CRESTOR) 20 MG tablet Take 20 mg by mouth once daily.      sumatriptan (IMITREX) 25 MG Tab Take 1 tablet by mouth daily as needed for Migraine.       No current facility-administered medications on file prior to visit.          Objective:      /75   Pulse 78   Resp 18   Ht 5' 6" (1.676 m)   Wt 105 kg (231 lb 7.7 oz)   BMI 37.36 kg/m²   Physical Exam  Constitutional:       General: She is not in acute distress.     Appearance: Normal appearance. She is not ill-appearing.   HENT:      Head: Normocephalic and atraumatic.      Nose: No congestion.   Eyes:      Extraocular Movements: Extraocular movements intact.   Cardiovascular:      Rate and Rhythm: Normal rate and regular rhythm.      Pulses: Normal pulses.   Pulmonary:      Effort: Pulmonary effort is normal.      Breath sounds: Normal breath sounds.   Abdominal:      General: There is no distension.      Palpations: Abdomen is soft.      Tenderness: There is no abdominal tenderness.   Musculoskeletal:      Comments: Left lower extremity:  Small spots of bloody drainage in the midportion of her incision.  Blistering along edges.  Superior and inferiorly the incision is well healed.  Staples removed today, Steri-Strips applied.  No calf swelling or no " worsening signs of DVT.  Distally she has 5/5 motor strength with dorsiflexion plantar flexion of the ankle palpable DP pulse.  She tolerates   Skin:     General: Skin is warm and dry.   Neurological:      Mental Status: She is alert and oriented to person, place, and time. Mental status is at baseline.   Psychiatric:         Mood and Affect: Mood normal.         Behavior: Behavior normal.         Thought Content: Thought content normal.         Judgment: Judgment normal.        Body mass index is 37.36 kg/m².    Radiology:         Assessment:         1. Closed displaced comminuted fracture of left patella, initial encounter                Plan:       She is doing well today her surgery.  She does have some drainage from her incision likely due to the anticoagulation that she is on.  No signs of any infection.  Her swelling is improving.  She can begin range motion exercises 0 -30°.  Outpatient he orders provided today she will follow up in a month for x-rays of the left knee.  She was going to [facility] for 3 months.  She will begin daily dressing changes at home and is okay to shower.  Call with any questions or concerns.    This note/OR report was created with the assistance of  voice recognition software or phone  dictation.  There may be transcription errors as a result of using this technology however minimal. Effort has been made to assure accuracy of transcription but any obvious errors or omissions should be clarified with the author of the document.       Curtis Corona MD  Orthopedic Trauma  [facility]      Follow up in about 4 weeks (around 12/14/2023).    Closed displaced comminuted fracture of left patella, initial encounter              No orders of the defined types were placed in this encounter.      No future appointments.

## 2023-11-17 RX ORDER — OXYCODONE AND ACETAMINOPHEN 10; 325 MG/1; MG/1
1 TABLET ORAL EVERY 8 HOURS PRN
Qty: 21 TABLET | Refills: 0 | Status: ON HOLD | OUTPATIENT
Start: 2023-11-17 | End: 2023-11-27 | Stop reason: HOSPADM

## 2023-11-21 ENCOUNTER — TELEPHONE (OUTPATIENT)
Dept: ORTHOPEDICS | Facility: CLINIC | Age: 63
End: 2023-11-21
Payer: MEDICARE

## 2023-11-21 NOTE — TELEPHONE ENCOUNTER
Spoke to patient, she has not heard from cardiologist since leaving message with him 2 days ago.  Her incision is still bleeding, ankle is still swollen, even with brace loosened. Reports continued daily nosebleeds. I encouraged her to report to ER, the continued bleeding is concerning, as well as hx of dvt.  Verbalized understanding.

## 2023-11-22 ENCOUNTER — ANESTHESIA EVENT (OUTPATIENT)
Dept: SURGERY | Facility: HOSPITAL | Age: 63
End: 2023-11-22

## 2023-11-22 ENCOUNTER — HOSPITAL ENCOUNTER (INPATIENT)
Facility: HOSPITAL | Age: 63
LOS: 5 days | Discharge: HOME | DRG: 909 | End: 2023-11-27
Attending: EMERGENCY MEDICINE | Admitting: INTERNAL MEDICINE
Payer: MEDICARE

## 2023-11-22 ENCOUNTER — TELEPHONE (OUTPATIENT)
Dept: ORTHOPEDICS | Facility: CLINIC | Age: 63
End: 2023-11-22
Payer: MEDICARE

## 2023-11-22 DIAGNOSIS — T81.31XA SURGICAL WOUND DEHISCENCE, INITIAL ENCOUNTER: ICD-10-CM

## 2023-11-22 DIAGNOSIS — R07.9 CHEST PAIN: ICD-10-CM

## 2023-11-22 DIAGNOSIS — T81.30XA WOUND DEHISCENCE: Primary | ICD-10-CM

## 2023-11-22 LAB
ALBUMIN SERPL-MCNC: 4 G/DL (ref 3.4–4.8)
ALBUMIN/GLOB SERPL: 1.1 RATIO (ref 1.1–2)
ALP SERPL-CCNC: 101 UNIT/L (ref 40–150)
ALT SERPL-CCNC: 14 UNIT/L (ref 0–55)
APTT PPP: 69.9 SECONDS (ref 23.4–33.9)
AST SERPL-CCNC: 23 UNIT/L (ref 5–34)
BASOPHILS # BLD AUTO: 0.02 X10(3)/MCL
BASOPHILS NFR BLD AUTO: 0.3 %
BILIRUB SERPL-MCNC: 0.3 MG/DL
BUN SERPL-MCNC: 11.1 MG/DL (ref 9.8–20.1)
CALCIUM SERPL-MCNC: 9.9 MG/DL (ref 8.4–10.2)
CHLORIDE SERPL-SCNC: 107 MMOL/L (ref 98–107)
CO2 SERPL-SCNC: 30 MMOL/L (ref 23–31)
CREAT SERPL-MCNC: 1.11 MG/DL (ref 0.55–1.02)
CRP SERPL HS-MCNC: 8.76 MG/L
EOSINOPHIL # BLD AUTO: 0.25 X10(3)/MCL (ref 0–0.9)
EOSINOPHIL NFR BLD AUTO: 3.6 %
ERYTHROCYTE [DISTWIDTH] IN BLOOD BY AUTOMATED COUNT: 15.1 % (ref 11.5–17)
ERYTHROCYTE [SEDIMENTATION RATE] IN BLOOD: 32 MM/HR (ref 0–20)
GFR SERPLBLD CREATININE-BSD FMLA CKD-EPI: 56 MLS/MIN/1.73/M2
GLOBULIN SER-MCNC: 3.5 GM/DL (ref 2.4–3.5)
GLUCOSE SERPL-MCNC: 112 MG/DL (ref 82–115)
HCT VFR BLD AUTO: 34.3 % (ref 37–47)
HGB BLD-MCNC: 10.8 G/DL (ref 12–16)
IMM GRANULOCYTES # BLD AUTO: 0.01 X10(3)/MCL (ref 0–0.04)
IMM GRANULOCYTES NFR BLD AUTO: 0.1 %
INR PPP: 2 (ref 2–3)
LYMPHOCYTES # BLD AUTO: 1.72 X10(3)/MCL (ref 0.6–4.6)
LYMPHOCYTES NFR BLD AUTO: 24.5 %
MCH RBC QN AUTO: 28.9 PG (ref 27–31)
MCHC RBC AUTO-ENTMCNC: 31.5 G/DL (ref 33–36)
MCV RBC AUTO: 91.7 FL (ref 80–94)
MONOCYTES # BLD AUTO: 0.61 X10(3)/MCL (ref 0.1–1.3)
MONOCYTES NFR BLD AUTO: 8.7 %
NEUTROPHILS # BLD AUTO: 4.41 X10(3)/MCL (ref 2.1–9.2)
NEUTROPHILS NFR BLD AUTO: 62.8 %
NRBC BLD AUTO-RTO: 0 %
PLATELET # BLD AUTO: 280 X10(3)/MCL (ref 130–400)
PMV BLD AUTO: 9.8 FL (ref 7.4–10.4)
POTASSIUM SERPL-SCNC: 4.3 MMOL/L (ref 3.5–5.1)
PROT SERPL-MCNC: 7.5 GM/DL (ref 5.8–7.6)
PROTHROMBIN TIME: 22.4 SECONDS (ref 11.7–14.5)
RBC # BLD AUTO: 3.74 X10(6)/MCL (ref 4.2–5.4)
SODIUM SERPL-SCNC: 145 MMOL/L (ref 136–145)
WBC # SPEC AUTO: 7.02 X10(3)/MCL (ref 4.5–11.5)

## 2023-11-22 PROCEDURE — 85025 COMPLETE CBC W/AUTO DIFF WBC: CPT | Performed by: EMERGENCY MEDICINE

## 2023-11-22 PROCEDURE — 11000001 HC ACUTE MED/SURG PRIVATE ROOM

## 2023-11-22 PROCEDURE — 25000003 PHARM REV CODE 250: Performed by: EMERGENCY MEDICINE

## 2023-11-22 PROCEDURE — 86141 C-REACTIVE PROTEIN HS: CPT | Performed by: EMERGENCY MEDICINE

## 2023-11-22 PROCEDURE — 63600175 PHARM REV CODE 636 W HCPCS: Performed by: EMERGENCY MEDICINE

## 2023-11-22 PROCEDURE — S5010 5% DEXTROSE AND 0.45% SALINE: HCPCS | Performed by: EMERGENCY MEDICINE

## 2023-11-22 PROCEDURE — 99285 EMERGENCY DEPT VISIT HI MDM: CPT

## 2023-11-22 PROCEDURE — 85652 RBC SED RATE AUTOMATED: CPT | Performed by: EMERGENCY MEDICINE

## 2023-11-22 PROCEDURE — 80053 COMPREHEN METABOLIC PANEL: CPT | Performed by: EMERGENCY MEDICINE

## 2023-11-22 PROCEDURE — 85730 THROMBOPLASTIN TIME PARTIAL: CPT | Performed by: EMERGENCY MEDICINE

## 2023-11-22 PROCEDURE — 85610 PROTHROMBIN TIME: CPT | Performed by: EMERGENCY MEDICINE

## 2023-11-22 RX ORDER — ATORVASTATIN CALCIUM 40 MG/1
80 TABLET, FILM COATED ORAL DAILY
Status: DISCONTINUED | OUTPATIENT
Start: 2023-11-23 | End: 2023-11-27 | Stop reason: HOSPADM

## 2023-11-22 RX ORDER — PROCHLORPERAZINE EDISYLATE 5 MG/ML
5 INJECTION INTRAMUSCULAR; INTRAVENOUS EVERY 6 HOURS PRN
Status: DISCONTINUED | OUTPATIENT
Start: 2023-11-23 | End: 2023-11-27 | Stop reason: HOSPADM

## 2023-11-22 RX ORDER — LOSARTAN POTASSIUM 25 MG/1
25 TABLET ORAL NIGHTLY
Status: DISCONTINUED | OUTPATIENT
Start: 2023-11-23 | End: 2023-11-27 | Stop reason: HOSPADM

## 2023-11-22 RX ORDER — LIOTHYRONINE SODIUM 5 UG/1
5 TABLET ORAL EVERY MORNING
Status: DISCONTINUED | OUTPATIENT
Start: 2023-11-23 | End: 2023-11-27 | Stop reason: HOSPADM

## 2023-11-22 RX ORDER — MORPHINE SULFATE 4 MG/ML
2 INJECTION, SOLUTION INTRAMUSCULAR; INTRAVENOUS EVERY 4 HOURS PRN
Status: DISCONTINUED | OUTPATIENT
Start: 2023-11-22 | End: 2023-11-23

## 2023-11-22 RX ORDER — SIMETHICONE 80 MG
1 TABLET,CHEWABLE ORAL 4 TIMES DAILY PRN
Status: DISCONTINUED | OUTPATIENT
Start: 2023-11-23 | End: 2023-11-27 | Stop reason: HOSPADM

## 2023-11-22 RX ORDER — ACETAMINOPHEN 325 MG/1
650 TABLET ORAL EVERY 6 HOURS PRN
Status: DISCONTINUED | OUTPATIENT
Start: 2023-11-23 | End: 2023-11-27 | Stop reason: HOSPADM

## 2023-11-22 RX ORDER — ONDANSETRON 2 MG/ML
4 INJECTION INTRAMUSCULAR; INTRAVENOUS EVERY 8 HOURS PRN
Status: DISCONTINUED | OUTPATIENT
Start: 2023-11-22 | End: 2023-11-27 | Stop reason: HOSPADM

## 2023-11-22 RX ORDER — ENOXAPARIN SODIUM 100 MG/ML
40 INJECTION SUBCUTANEOUS EVERY 24 HOURS
Status: DISCONTINUED | OUTPATIENT
Start: 2023-11-23 | End: 2023-11-23

## 2023-11-22 RX ORDER — OXYCODONE AND ACETAMINOPHEN 10; 325 MG/1; MG/1
1 TABLET ORAL EVERY 8 HOURS PRN
Status: DISCONTINUED | OUTPATIENT
Start: 2023-11-22 | End: 2023-11-23

## 2023-11-22 RX ORDER — DEXTROSE MONOHYDRATE AND SODIUM CHLORIDE 5; .45 G/100ML; G/100ML
INJECTION, SOLUTION INTRAVENOUS CONTINUOUS
Status: DISCONTINUED | OUTPATIENT
Start: 2023-11-22 | End: 2023-11-23

## 2023-11-22 RX ORDER — LOSARTAN POTASSIUM 25 MG/1
25 TABLET ORAL ONCE
Status: COMPLETED | OUTPATIENT
Start: 2023-11-22 | End: 2023-11-22

## 2023-11-22 RX ORDER — SODIUM CHLORIDE 0.9 % (FLUSH) 0.9 %
10 SYRINGE (ML) INJECTION
Status: DISCONTINUED | OUTPATIENT
Start: 2023-11-22 | End: 2023-11-27 | Stop reason: HOSPADM

## 2023-11-22 RX ORDER — TALC
6 POWDER (GRAM) TOPICAL NIGHTLY PRN
Status: DISCONTINUED | OUTPATIENT
Start: 2023-11-22 | End: 2023-11-27 | Stop reason: HOSPADM

## 2023-11-22 RX ORDER — NALOXONE HCL 0.4 MG/ML
0.02 VIAL (ML) INJECTION
Status: DISCONTINUED | OUTPATIENT
Start: 2023-11-23 | End: 2023-11-27 | Stop reason: HOSPADM

## 2023-11-22 RX ORDER — ESCITALOPRAM OXALATE 10 MG/1
20 TABLET ORAL NIGHTLY
Status: DISCONTINUED | OUTPATIENT
Start: 2023-11-23 | End: 2023-11-27 | Stop reason: HOSPADM

## 2023-11-22 RX ORDER — METHOCARBAMOL 750 MG/1
750 TABLET, FILM COATED ORAL 3 TIMES DAILY
Status: DISCONTINUED | OUTPATIENT
Start: 2023-11-23 | End: 2023-11-27 | Stop reason: HOSPADM

## 2023-11-22 RX ORDER — POLYETHYLENE GLYCOL 3350 17 G/17G
17 POWDER, FOR SOLUTION ORAL 2 TIMES DAILY PRN
Status: DISCONTINUED | OUTPATIENT
Start: 2023-11-23 | End: 2023-11-27 | Stop reason: HOSPADM

## 2023-11-22 RX ORDER — MORPHINE SULFATE 4 MG/ML
4 INJECTION, SOLUTION INTRAMUSCULAR; INTRAVENOUS EVERY 4 HOURS PRN
Status: DISCONTINUED | OUTPATIENT
Start: 2023-11-22 | End: 2023-11-23

## 2023-11-22 RX ORDER — LEVOTHYROXINE SODIUM 100 UG/1
100 TABLET ORAL DAILY
Status: DISCONTINUED | OUTPATIENT
Start: 2023-11-23 | End: 2023-11-27 | Stop reason: HOSPADM

## 2023-11-22 RX ORDER — MAG HYDROX/ALUMINUM HYD/SIMETH 200-200-20
30 SUSPENSION, ORAL (FINAL DOSE FORM) ORAL 4 TIMES DAILY PRN
Status: DISCONTINUED | OUTPATIENT
Start: 2023-11-23 | End: 2023-11-27 | Stop reason: HOSPADM

## 2023-11-22 RX ADMIN — LOSARTAN POTASSIUM 25 MG: 25 TABLET, FILM COATED ORAL at 08:11

## 2023-11-22 RX ADMIN — DEXTROSE AND SODIUM CHLORIDE: 5; 450 INJECTION, SOLUTION INTRAVENOUS at 07:11

## 2023-11-22 RX ADMIN — DEXTROSE MONOHYDRATE 1 G: 2.5 INJECTION INTRAVENOUS at 08:11

## 2023-11-22 NOTE — TELEPHONE ENCOUNTER
Patient calls in reporting increased bleeding to knee, with top of incision opening.  She has not heard from cardiologist.  I instructed her to put pressure dressing on incision, and go directly to ER.  Verbalized understanding.

## 2023-11-22 NOTE — Clinical Note
Diagnosis: Wound dehiscence [692640]   Future Attending Provider: CARLOS DAVIDSON [[id number]]   Admitting Provider:: CARLOS DAVDISON [[id number]]   Admit to which facility:: [facility] [[id number]]   Reason for IP Medical Treatment  (Clinical interventions that can only be accomplished in the IP setting? ) :: postoperative wound dehiscence requiring washout   I certify that Inpatient services for greater than or equal to 2 midnights are medically necessary:: Yes   Plans for Post-Acute care--if anticipated (pick the single best option):: C. Discharge home with home health services   Special Needs:: No Special Needs [1]

## 2023-11-23 ENCOUNTER — ANESTHESIA (OUTPATIENT)
Dept: SURGERY | Facility: HOSPITAL | Age: 63
End: 2023-11-23
Payer: MEDICARE

## 2023-11-23 PROBLEM — S80.02XA HEMATOMA OF LEFT KNEE REGION: Status: ACTIVE | Noted: 2023-11-23

## 2023-11-23 PROBLEM — T81.31XA SURGICAL WOUND DEHISCENCE, INITIAL ENCOUNTER: Status: ACTIVE | Noted: 2023-11-23

## 2023-11-23 LAB
ALBUMIN SERPL-MCNC: 3.4 G/DL (ref 3.4–4.8)
ALBUMIN/GLOB SERPL: 1.3 RATIO (ref 1.1–2)
ALP SERPL-CCNC: 88 UNIT/L (ref 40–150)
ALT SERPL-CCNC: 13 UNIT/L (ref 0–55)
APTT PPP: 53.9 SECONDS (ref 23.2–33.7)
AST SERPL-CCNC: 20 UNIT/L (ref 5–34)
BASOPHILS # BLD AUTO: 0.03 X10(3)/MCL
BASOPHILS NFR BLD AUTO: 0.5 %
BILIRUB SERPL-MCNC: 0.3 MG/DL
BUN SERPL-MCNC: 11.3 MG/DL (ref 9.8–20.1)
CALCIUM SERPL-MCNC: 8.5 MG/DL (ref 8.4–10.2)
CHLORIDE SERPL-SCNC: 105 MMOL/L (ref 98–107)
CO2 SERPL-SCNC: 26 MMOL/L (ref 23–31)
CREAT SERPL-MCNC: 0.82 MG/DL (ref 0.55–1.02)
EOSINOPHIL # BLD AUTO: 0.28 X10(3)/MCL (ref 0–0.9)
EOSINOPHIL NFR BLD AUTO: 4.7 %
ERYTHROCYTE [DISTWIDTH] IN BLOOD BY AUTOMATED COUNT: 15.4 % (ref 11.5–17)
GFR SERPLBLD CREATININE-BSD FMLA CKD-EPI: >60 MLS/MIN/1.73/M2
GLOBULIN SER-MCNC: 2.7 GM/DL (ref 2.4–3.5)
GLUCOSE SERPL-MCNC: 109 MG/DL (ref 82–115)
HCT VFR BLD AUTO: 32.6 % (ref 37–47)
HGB BLD-MCNC: 9.9 G/DL (ref 12–16)
IMM GRANULOCYTES # BLD AUTO: 0.01 X10(3)/MCL (ref 0–0.04)
IMM GRANULOCYTES NFR BLD AUTO: 0.2 %
INR PPP: 1.5
LYMPHOCYTES # BLD AUTO: 1.99 X10(3)/MCL (ref 0.6–4.6)
LYMPHOCYTES NFR BLD AUTO: 33.6 %
MAGNESIUM SERPL-MCNC: 2.2 MG/DL (ref 1.6–2.6)
MCH RBC QN AUTO: 28.9 PG (ref 27–31)
MCHC RBC AUTO-ENTMCNC: 30.4 G/DL (ref 33–36)
MCV RBC AUTO: 95 FL (ref 80–94)
MONOCYTES # BLD AUTO: 0.55 X10(3)/MCL (ref 0.1–1.3)
MONOCYTES NFR BLD AUTO: 9.3 %
NEUTROPHILS # BLD AUTO: 3.06 X10(3)/MCL (ref 2.1–9.2)
NEUTROPHILS NFR BLD AUTO: 51.7 %
NRBC BLD AUTO-RTO: 0 %
PHOSPHATE SERPL-MCNC: 3.6 MG/DL (ref 2.3–4.7)
PLATELET # BLD AUTO: 240 X10(3)/MCL (ref 130–400)
PMV BLD AUTO: 10.2 FL (ref 7.4–10.4)
POTASSIUM SERPL-SCNC: 3.8 MMOL/L (ref 3.5–5.1)
PROT SERPL-MCNC: 6.1 GM/DL (ref 5.8–7.6)
PROTHROMBIN TIME: 17.6 SECONDS (ref 12.5–14.5)
RBC # BLD AUTO: 3.43 X10(6)/MCL (ref 4.2–5.4)
SODIUM SERPL-SCNC: 139 MMOL/L (ref 136–145)
WBC # SPEC AUTO: 5.92 X10(3)/MCL (ref 4.5–11.5)

## 2023-11-23 PROCEDURE — 99223 1ST HOSP IP/OBS HIGH 75: CPT | Mod: 57,,, | Performed by: ORTHOPAEDIC SURGERY

## 2023-11-23 PROCEDURE — 94761 N-INVAS EAR/PLS OXIMETRY MLT: CPT

## 2023-11-23 PROCEDURE — 85025 COMPLETE CBC W/AUTO DIFF WBC: CPT | Performed by: NURSE PRACTITIONER

## 2023-11-23 PROCEDURE — 25000003 PHARM REV CODE 250: Performed by: NURSE ANESTHETIST, CERTIFIED REGISTERED

## 2023-11-23 PROCEDURE — 27000221 HC OXYGEN, UP TO 24 HOURS

## 2023-11-23 PROCEDURE — 11000001 HC ACUTE MED/SURG PRIVATE ROOM

## 2023-11-23 PROCEDURE — 36000704 HC OR TIME LEV I 1ST 15 MIN: Performed by: ORTHOPAEDIC SURGERY

## 2023-11-23 PROCEDURE — A6010 COLLAGEN BASED WOUND FILLER: HCPCS | Performed by: ORTHOPAEDIC SURGERY

## 2023-11-23 PROCEDURE — 63600175 PHARM REV CODE 636 W HCPCS: Performed by: ANESTHESIOLOGY

## 2023-11-23 PROCEDURE — D9220A PRA ANESTHESIA: Mod: ANES,,, | Performed by: ANESTHESIOLOGY

## 2023-11-23 PROCEDURE — D9220A PRA ANESTHESIA: ICD-10-PCS | Mod: ANES,,, | Performed by: ANESTHESIOLOGY

## 2023-11-23 PROCEDURE — 80053 COMPREHEN METABOLIC PANEL: CPT | Performed by: NURSE PRACTITIONER

## 2023-11-23 PROCEDURE — 37000008 HC ANESTHESIA 1ST 15 MINUTES: Performed by: ORTHOPAEDIC SURGERY

## 2023-11-23 PROCEDURE — 71000033 HC RECOVERY, INTIAL HOUR: Performed by: ORTHOPAEDIC SURGERY

## 2023-11-23 PROCEDURE — 63600175 PHARM REV CODE 636 W HCPCS: Performed by: EMERGENCY MEDICINE

## 2023-11-23 PROCEDURE — 85730 THROMBOPLASTIN TIME PARTIAL: CPT | Performed by: NURSE PRACTITIONER

## 2023-11-23 PROCEDURE — 25000003 PHARM REV CODE 250: Performed by: ORTHOPAEDIC SURGERY

## 2023-11-23 PROCEDURE — 25000003 PHARM REV CODE 250: Performed by: EMERGENCY MEDICINE

## 2023-11-23 PROCEDURE — D9220A PRA ANESTHESIA: Mod: CRNA,,, | Performed by: NURSE ANESTHETIST, CERTIFIED REGISTERED

## 2023-11-23 PROCEDURE — 84100 ASSAY OF PHOSPHORUS: CPT | Performed by: NURSE PRACTITIONER

## 2023-11-23 PROCEDURE — D9220A PRA ANESTHESIA: ICD-10-PCS | Mod: CRNA,,, | Performed by: NURSE ANESTHETIST, CERTIFIED REGISTERED

## 2023-11-23 PROCEDURE — 83735 ASSAY OF MAGNESIUM: CPT | Performed by: NURSE PRACTITIONER

## 2023-11-23 PROCEDURE — 27201423 OPTIME MED/SURG SUP & DEVICES STERILE SUPPLY: Performed by: ORTHOPAEDIC SURGERY

## 2023-11-23 PROCEDURE — 37000009 HC ANESTHESIA EA ADD 15 MINS: Performed by: ORTHOPAEDIC SURGERY

## 2023-11-23 PROCEDURE — 25000003 PHARM REV CODE 250: Performed by: NURSE PRACTITIONER

## 2023-11-23 PROCEDURE — 63600175 PHARM REV CODE 636 W HCPCS: Performed by: ORTHOPAEDIC SURGERY

## 2023-11-23 PROCEDURE — 85610 PROTHROMBIN TIME: CPT | Performed by: NURSE PRACTITIONER

## 2023-11-23 PROCEDURE — 71000039 HC RECOVERY, EACH ADD'L HOUR: Performed by: ORTHOPAEDIC SURGERY

## 2023-11-23 PROCEDURE — 63600175 PHARM REV CODE 636 W HCPCS: Performed by: NURSE PRACTITIONER

## 2023-11-23 PROCEDURE — 27301 DRAIN THIGH/KNEE LESION: CPT | Mod: 78,LT,, | Performed by: ORTHOPAEDIC SURGERY

## 2023-11-23 PROCEDURE — 63600175 PHARM REV CODE 636 W HCPCS: Performed by: NURSE ANESTHETIST, CERTIFIED REGISTERED

## 2023-11-23 PROCEDURE — 99223 PR INITIAL HOSPITAL CARE,LEVL III: ICD-10-PCS | Mod: 57,,, | Performed by: ORTHOPAEDIC SURGERY

## 2023-11-23 PROCEDURE — 27301 PR INCIS/DRAIN THIGH/KNEE ABSCESS,DEEP: ICD-10-PCS | Mod: 78,LT,, | Performed by: ORTHOPAEDIC SURGERY

## 2023-11-23 PROCEDURE — 36000705 HC OR TIME LEV I EA ADD 15 MIN: Performed by: ORTHOPAEDIC SURGERY

## 2023-11-23 RX ORDER — OXYCODONE AND ACETAMINOPHEN 5; 325 MG/1; MG/1
1 TABLET ORAL EVERY 4 HOURS PRN
Status: DISCONTINUED | OUTPATIENT
Start: 2023-11-23 | End: 2023-11-27 | Stop reason: HOSPADM

## 2023-11-23 RX ORDER — PROPOFOL 10 MG/ML
VIAL (ML) INTRAVENOUS
Status: DISCONTINUED | OUTPATIENT
Start: 2023-11-23 | End: 2023-11-23

## 2023-11-23 RX ORDER — ONDANSETRON 4 MG/1
4 TABLET, ORALLY DISINTEGRATING ORAL EVERY 6 HOURS PRN
Status: CANCELLED | OUTPATIENT
Start: 2023-11-23

## 2023-11-23 RX ORDER — MIDAZOLAM HYDROCHLORIDE 1 MG/ML
INJECTION INTRAMUSCULAR; INTRAVENOUS
Status: DISCONTINUED | OUTPATIENT
Start: 2023-11-23 | End: 2023-11-23

## 2023-11-23 RX ORDER — LIDOCAINE HYDROCHLORIDE 10 MG/ML
1 INJECTION, SOLUTION EPIDURAL; INFILTRATION; INTRACAUDAL; PERINEURAL ONCE
Status: CANCELLED | OUTPATIENT
Start: 2023-11-23 | End: 2023-11-23

## 2023-11-23 RX ORDER — ONDANSETRON 2 MG/ML
INJECTION INTRAMUSCULAR; INTRAVENOUS
Status: DISCONTINUED | OUTPATIENT
Start: 2023-11-23 | End: 2023-11-23

## 2023-11-23 RX ORDER — SODIUM CHLORIDE, SODIUM LACTATE, POTASSIUM CHLORIDE, CALCIUM CHLORIDE 600; 310; 30; 20 MG/100ML; MG/100ML; MG/100ML; MG/100ML
INJECTION, SOLUTION INTRAVENOUS CONTINUOUS
Status: CANCELLED | OUTPATIENT
Start: 2023-11-23

## 2023-11-23 RX ORDER — OXYCODONE AND ACETAMINOPHEN 10; 325 MG/1; MG/1
1 TABLET ORAL EVERY 4 HOURS PRN
Status: DISCONTINUED | OUTPATIENT
Start: 2023-11-23 | End: 2023-11-27 | Stop reason: HOSPADM

## 2023-11-23 RX ORDER — SODIUM CHLORIDE, SODIUM LACTATE, POTASSIUM CHLORIDE, CALCIUM CHLORIDE 600; 310; 30; 20 MG/100ML; MG/100ML; MG/100ML; MG/100ML
INJECTION, SOLUTION INTRAVENOUS CONTINUOUS
Status: DISCONTINUED | OUTPATIENT
Start: 2023-11-23 | End: 2023-11-26

## 2023-11-23 RX ORDER — CEFAZOLIN SODIUM 1 G/3ML
INJECTION, POWDER, FOR SOLUTION INTRAMUSCULAR; INTRAVENOUS
Status: DISCONTINUED | OUTPATIENT
Start: 2023-11-23 | End: 2023-11-23

## 2023-11-23 RX ORDER — LIDOCAINE HYDROCHLORIDE 20 MG/ML
INJECTION INTRAVENOUS
Status: DISCONTINUED | OUTPATIENT
Start: 2023-11-23 | End: 2023-11-23

## 2023-11-23 RX ORDER — VANCOMYCIN HYDROCHLORIDE 1 G/20ML
INJECTION, POWDER, LYOPHILIZED, FOR SOLUTION INTRAVENOUS
Status: DISCONTINUED | OUTPATIENT
Start: 2023-11-23 | End: 2023-11-23 | Stop reason: HOSPADM

## 2023-11-23 RX ORDER — HYDROXYZINE PAMOATE 25 MG/1
25 CAPSULE ORAL EVERY 8 HOURS PRN
Status: DISCONTINUED | OUTPATIENT
Start: 2023-11-23 | End: 2023-11-24

## 2023-11-23 RX ORDER — METHOCARBAMOL 100 MG/ML
1000 INJECTION, SOLUTION INTRAMUSCULAR; INTRAVENOUS ONCE
Status: COMPLETED | OUTPATIENT
Start: 2023-11-23 | End: 2023-11-23

## 2023-11-23 RX ORDER — FENTANYL CITRATE 50 UG/ML
INJECTION, SOLUTION INTRAMUSCULAR; INTRAVENOUS
Status: DISCONTINUED | OUTPATIENT
Start: 2023-11-23 | End: 2023-11-23

## 2023-11-23 RX ORDER — ACETAMINOPHEN 10 MG/ML
1000 INJECTION, SOLUTION INTRAVENOUS ONCE
Status: COMPLETED | OUTPATIENT
Start: 2023-11-23 | End: 2023-11-23

## 2023-11-23 RX ORDER — LABETALOL HYDROCHLORIDE 5 MG/ML
10 INJECTION, SOLUTION INTRAVENOUS
Status: DISCONTINUED | OUTPATIENT
Start: 2023-11-23 | End: 2023-11-27 | Stop reason: HOSPADM

## 2023-11-23 RX ORDER — HYDROMORPHONE HYDROCHLORIDE 2 MG/ML
0.2 INJECTION, SOLUTION INTRAMUSCULAR; INTRAVENOUS; SUBCUTANEOUS EVERY 5 MIN PRN
Status: COMPLETED | OUTPATIENT
Start: 2023-11-23 | End: 2023-11-23

## 2023-11-23 RX ORDER — SODIUM CITRATE AND CITRIC ACID MONOHYDRATE 334; 500 MG/5ML; MG/5ML
30 SOLUTION ORAL
Status: CANCELLED | OUTPATIENT
Start: 2023-11-23

## 2023-11-23 RX ORDER — MIDAZOLAM HYDROCHLORIDE 1 MG/ML
2 INJECTION INTRAMUSCULAR; INTRAVENOUS ONCE AS NEEDED
Status: CANCELLED | OUTPATIENT
Start: 2023-11-23 | End: 2035-04-21

## 2023-11-23 RX ORDER — HYDRALAZINE HYDROCHLORIDE 20 MG/ML
10 INJECTION INTRAMUSCULAR; INTRAVENOUS EVERY 4 HOURS PRN
Status: DISCONTINUED | OUTPATIENT
Start: 2023-11-23 | End: 2023-11-27 | Stop reason: HOSPADM

## 2023-11-23 RX ORDER — MEPERIDINE HYDROCHLORIDE 25 MG/ML
12.5 INJECTION INTRAMUSCULAR; INTRAVENOUS; SUBCUTANEOUS ONCE AS NEEDED
Status: ACTIVE | OUTPATIENT
Start: 2023-11-23 | End: 2023-11-24

## 2023-11-23 RX ORDER — HYDROMORPHONE HYDROCHLORIDE 2 MG/ML
0.5 INJECTION, SOLUTION INTRAMUSCULAR; INTRAVENOUS; SUBCUTANEOUS EVERY 5 MIN PRN
Status: DISCONTINUED | OUTPATIENT
Start: 2023-11-23 | End: 2023-11-27 | Stop reason: HOSPADM

## 2023-11-23 RX ORDER — CEFAZOLIN SODIUM 2 G/50ML
2 SOLUTION INTRAVENOUS
Status: DISPENSED | OUTPATIENT
Start: 2023-11-23 | End: 2023-11-25

## 2023-11-23 RX ORDER — MORPHINE SULFATE 4 MG/ML
4 INJECTION, SOLUTION INTRAMUSCULAR; INTRAVENOUS
Status: DISCONTINUED | OUTPATIENT
Start: 2023-11-23 | End: 2023-11-27 | Stop reason: HOSPADM

## 2023-11-23 RX ADMIN — HYDROMORPHONE HYDROCHLORIDE 0.2 MG: 2 INJECTION INTRAMUSCULAR; INTRAVENOUS; SUBCUTANEOUS at 09:11

## 2023-11-23 RX ADMIN — MIDAZOLAM HYDROCHLORIDE 2 MG: 1 INJECTION, SOLUTION INTRAMUSCULAR; INTRAVENOUS at 07:11

## 2023-11-23 RX ADMIN — METHOCARBAMOL 750 MG: 750 TABLET ORAL at 12:11

## 2023-11-23 RX ADMIN — HYDROMORPHONE HYDROCHLORIDE 0.2 MG: 2 INJECTION INTRAMUSCULAR; INTRAVENOUS; SUBCUTANEOUS at 08:11

## 2023-11-23 RX ADMIN — Medication 6 MG: at 08:11

## 2023-11-23 RX ADMIN — ESCITALOPRAM OXALATE 20 MG: 10 TABLET ORAL at 08:11

## 2023-11-23 RX ADMIN — HYDROMORPHONE HYDROCHLORIDE 0.5 MG: 2 INJECTION INTRAMUSCULAR; INTRAVENOUS; SUBCUTANEOUS at 09:11

## 2023-11-23 RX ADMIN — CEFAZOLIN 1 G: 330 INJECTION, POWDER, FOR SOLUTION INTRAMUSCULAR; INTRAVENOUS at 08:11

## 2023-11-23 RX ADMIN — SODIUM CHLORIDE, POTASSIUM CHLORIDE, SODIUM LACTATE AND CALCIUM CHLORIDE: 600; 310; 30; 20 INJECTION, SOLUTION INTRAVENOUS at 03:11

## 2023-11-23 RX ADMIN — OXYCODONE AND ACETAMINOPHEN 1 TABLET: 10; 325 TABLET ORAL at 12:11

## 2023-11-23 RX ADMIN — DEXTROSE MONOHYDRATE 1 G: 2.5 INJECTION INTRAVENOUS at 04:11

## 2023-11-23 RX ADMIN — ACETAMINOPHEN 1000 MG: 10 INJECTION, SOLUTION INTRAVENOUS at 08:11

## 2023-11-23 RX ADMIN — CEFAZOLIN SODIUM 2 G: 2 SOLUTION INTRAVENOUS at 02:11

## 2023-11-23 RX ADMIN — METHOCARBAMOL 750 MG: 750 TABLET ORAL at 08:11

## 2023-11-23 RX ADMIN — LEVOTHYROXINE SODIUM 100 MCG: 100 TABLET ORAL at 10:11

## 2023-11-23 RX ADMIN — ATORVASTATIN CALCIUM 80 MG: 40 TABLET, FILM COATED ORAL at 10:11

## 2023-11-23 RX ADMIN — FENTANYL CITRATE 100 MCG: 50 INJECTION, SOLUTION INTRAMUSCULAR; INTRAVENOUS at 07:11

## 2023-11-23 RX ADMIN — OXYCODONE AND ACETAMINOPHEN 1 TABLET: 10; 325 TABLET ORAL at 11:11

## 2023-11-23 RX ADMIN — OXYCODONE AND ACETAMINOPHEN 1 TABLET: 10; 325 TABLET ORAL at 04:11

## 2023-11-23 RX ADMIN — MORPHINE SULFATE 4 MG: 4 INJECTION, SOLUTION INTRAMUSCULAR; INTRAVENOUS at 09:11

## 2023-11-23 RX ADMIN — CEFAZOLIN SODIUM 2 G: 2 SOLUTION INTRAVENOUS at 11:11

## 2023-11-23 RX ADMIN — LIDOCAINE HYDROCHLORIDE 80 MG: 20 INJECTION INTRAVENOUS at 07:11

## 2023-11-23 RX ADMIN — SODIUM CHLORIDE, POTASSIUM CHLORIDE, SODIUM LACTATE AND CALCIUM CHLORIDE: 600; 310; 30; 20 INJECTION, SOLUTION INTRAVENOUS at 09:11

## 2023-11-23 RX ADMIN — METHOCARBAMOL 1000 MG: 100 INJECTION INTRAMUSCULAR; INTRAVENOUS at 08:11

## 2023-11-23 RX ADMIN — MORPHINE SULFATE 4 MG: 4 INJECTION, SOLUTION INTRAMUSCULAR; INTRAVENOUS at 02:11

## 2023-11-23 RX ADMIN — ONDANSETRON 4 MG: 2 INJECTION INTRAMUSCULAR; INTRAVENOUS at 07:11

## 2023-11-23 RX ADMIN — LOSARTAN POTASSIUM 25 MG: 25 TABLET, FILM COATED ORAL at 08:11

## 2023-11-23 RX ADMIN — PROPOFOL 150 MG: 10 INJECTION, EMULSION INTRAVENOUS at 07:11

## 2023-11-23 RX ADMIN — HYDROXYZINE PAMOATE 25 MG: 25 CAPSULE ORAL at 08:11

## 2023-11-23 RX ADMIN — SODIUM CHLORIDE, SODIUM GLUCONATE, SODIUM ACETATE, POTASSIUM CHLORIDE AND MAGNESIUM CHLORIDE: 526; 502; 368; 37; 30 INJECTION, SOLUTION INTRAVENOUS at 07:11

## 2023-11-23 RX ADMIN — METHOCARBAMOL 750 MG: 750 TABLET ORAL at 02:11

## 2023-11-23 NOTE — PROGRESS NOTES
"[facility]  Hospital Medicine Progress Note        Chief Complaint: Inpatient Follow-up for wound dehiscence    HPI:   Ms. Torres is a 63 year old female with a pmh of HTN, HLD, lupus, CVA, migraines, fibromyalgia, anxiety/depression, LLE DVT who presented to [facility] ED with complaints of wound dehiscence to her left knee.  She states that she had an ORIF performed last month for a patella fracture. She states that she has been changing the bandages as directed and has noticed that over time, they have been becoming more and more bloody.  Today, when she went to change her bandage, she noticed that her sugical site incision had opened up. She called Dr. Corona's office and they suggested she present to [facility] ED. She denies any fever, chills, or drainage.     ED vitals on arrival:  Temp 98.4° F, pulse 87, resp 20, /92, SpO2 97% on RA.  ED lab work revealed H&H 10.8/34.3, sed rate 32, PT 22.4, APTT 69.9, CRP 8.76. Orthopedics was consulted. She was started on Ancef 1 g q.8 hours, transferred to [facility] and admitted to Hospital Medicine for management of wound days since.      Orthopedics recommended washout and holding of Xarelto.        Interval Hx:   Afebrile, blood pressure elevated.  Scheduled for washout today.    Labs this morning showing minimally elevated INR, hemoglobin 9.9 with stable platelets and stable electrolytes      Objective/physical exam:  Vitals:    11/22/23 2336 11/23/23 0024 11/23/23 0349 11/23/23 0637   BP: (!) 157/80  135/76 (!) 166/91   BP Location:    Left arm   Patient Position:    Lying   Pulse: 68  66 64   Resp: 20 20  17   Temp: 98.1 °F (36.7 °C)  97.8 °F (36.6 °C)    TempSrc: Oral  Oral    SpO2: 98%  98% 99%   Weight: 93.5 kg (206 lb 2.1 oz)      Height: 5' 6" (1.676 m)        General: In no acute distress, afebrile  Respiratory: Clear to auscultation bilaterally  Cardiovascular: S1, S2, no appreciable murmur  Abdomen: Soft, nontender, BS +  Neurologic: " Alert and oriented x4, moving all extremities with good strength     Lab Results   Component Value Date     11/23/2023    K 3.8 11/23/2023    CO2 26 11/23/2023    BUN 11.3 11/23/2023    CREATININE 0.82 11/23/2023    CALCIUM 8.5 11/23/2023      Lab Results   Component Value Date    ALT 13 11/23/2023    AST 20 11/23/2023    ALKPHOS 88 11/23/2023    BILITOT 0.3 11/23/2023      Lab Results   Component Value Date    WBC 5.92 11/23/2023    HGB 9.9 (L) 11/23/2023    HCT 32.6 (L) 11/23/2023    MCV 95.0 (H) 11/23/2023     11/23/2023           Medications:   atorvastatin  80 mg Oral Daily    ceFAZolin (ANCEF) IVPB  1 g Intravenous Q8H    enoxparin  40 mg Subcutaneous Daily    EScitalopram oxalate  20 mg Oral QHS    levothyroxine  100 mcg Oral Daily    liothyronine  5 mcg Oral QAM    losartan  25 mg Oral QHS    methocarbamoL  750 mg Oral TID      acetaminophen, aluminum-magnesium hydroxide-simethicone, hydrALAZINE, labetaloL, melatonin, morphine, morphine, naloxone, ondansetron, oxyCODONE-acetaminophen, polyethylene glycol, prochlorperazine, simethicone, sodium chloride 0.9%     Assessment/Plan:    Surgical wound dehiscence, surgical site hematoma    HX: HTN, HLD, chronic lupus, history of CVA, migraine, fibromyalgia, anxiety, depression, history of left lower extremity DVT on Xarelto    Plan:  -orthopedics on board, scheduled for washout today.    -continue antibiotics for today  -Xarelto on hold.  Monitor hemoglobin   -other home medications will be reviewed and renewed    Lovenox.  Xarelto when cleared from surgery    James Frances MD

## 2023-11-23 NOTE — ED PROVIDER NOTES
"Encounter Date: 11/22/2023       History     Chief Complaint   Patient presents with    Wound Dehiscence     Reports of having a procedure done on her left knee about a month ago. Patient states she was placed on a blood thinner, has had bleeding on and off since the surgery. Today the patient reports that the site "busted open."      Patient is a 64 yo F presenting with post operative problem. She had ORIF of L patella fracture on 10/27 by Dr. Corona. She had been healing well but had been having some complications related to persistent oozing from wound. She is on xarelto for DVT. She has frequent bandage changes. The wound had remained intact and some of the center staples had been removed and steri strips placed. However today when she went to change her bandage which was bloody, she noticed the wound was open. She called Dr. Corona's office, spoke with Maddie who recommended packing and to report to [facility] ED. She denies any fevers, chills, purulent drainage, or other complaints.         Review of patient's allergies indicates:   Allergen Reactions    Azithromycin     Levofloxacin      Past Medical History:   Diagnosis Date    Acute deep vein thrombosis (DVT) of left lower extremity, unspecified vein     Anxiety     Arthritis     Closed displaced comminuted fracture of left patella, initial encounter     Dependence on crutches     Depression     Fibromyalgia     HLD (hyperlipidemia)     Hypertension     Insomnia     Migraine     SOB (shortness of breath)     Stroke 2005    Systemic lupus erythematosus, organ or system involvement unspecified     Thyroid disease      Past Surgical History:   Procedure Laterality Date    CATARACT EXTRACTION      CHOLECYSTECTOMY      COLONOSCOPY      ESOPHAGOGASTRODUODENOSCOPY      HYSTERECTOMY      LUMBAR SPINE SURGERY      OPEN REDUCTION AND INTERNAL FIXATION (ORIF) OF FRACTURE OF PATELLA Left 10/27/2023    Procedure: ORIF, FRACTURE, PATELLA, LEFT;  Surgeon: Curtis Corona, " "MD;  Location: [facility];  Service: Orthopedics;  Laterality: Left;  Supine, vascular bed, bone foam, tourniquet  1st case  Synthes small CCHS, 18g wire, locking patella plates    ROTATOR CUFF REPAIR Bilateral     total of 4 surgeries    THYROIDECTOMY      TOTAL HIP ARTHROPLASTY Right     TOTAL KNEE ARTHROPLASTY Right     c     No family history on file.  Social History     Tobacco Use    Smoking status: Never    Smokeless tobacco: Never   Substance Use Topics    Alcohol use: Never    Drug use: Yes     Types: Marijuana     Comment: "medical marijuana"     Review of Systems   Constitutional:  Negative for fever.   HENT:  Negative for sore throat.    Respiratory:  Negative for shortness of breath.    Cardiovascular:  Negative for chest pain.   Gastrointestinal:  Negative for nausea.   Genitourinary:  Negative for dysuria.   Musculoskeletal:  Negative for back pain.   Skin:  Positive for wound. Negative for rash.   Neurological:  Negative for weakness.   Hematological:  Bruises/bleeds easily.       Physical Exam     Initial Vitals [11/22/23 1806]   BP Pulse Resp Temp SpO2   (!) 177/92 87 20 98.4 °F (36.9 °C) 97 %      MAP       --         Physical Exam    Nursing note and vitals reviewed.  Constitutional: She appears well-developed and well-nourished. No distress.   HENT:   Head: Normocephalic and atraumatic.   Neck: Neck supple.   Cardiovascular:  Normal rate, regular rhythm and normal heart sounds.           No murmur heard.  Pulmonary/Chest: Breath sounds normal. No respiratory distress. She has no wheezes. She has no rhonchi.   Musculoskeletal:      Cervical back: Neck supple.      Comments: LLE: Neurovascularly intact. No excessive warmth. There is some granulation tissue around the wound that is healing and appears appropriate in color. No active bleeding at this time. No purulence. The mid 30-40% of the wound  is open, through the dermis and into the superficial subcutaneous tissue.      Neurological: She is alert " and oriented to person, place, and time.   Skin: Skin is warm and dry.   Psychiatric: She has a normal mood and affect. Thought content normal.        Media Information          ED Course   Procedures  Labs Reviewed   COMPREHENSIVE METABOLIC PANEL - Abnormal; Notable for the following components:       Result Value    Creatinine 1.11 (*)     All other components within normal limits   PROTIME-INR - Abnormal; Notable for the following components:    PT 22.4 (*)     All other components within normal limits   APTT - Abnormal; Notable for the following components:    PTT 69.9 (*)     All other components within normal limits   HIGH SENSITIVITY CRP - Abnormal; Notable for the following components:    C-Reactive Protein High Sensitivity 8.76 (*)     All other components within normal limits   SEDIMENTATION RATE, AUTOMATED - Abnormal; Notable for the following components:    Sed Rate 32 (*)     All other components within normal limits   CBC WITH DIFFERENTIAL - Abnormal; Notable for the following components:    RBC 3.74 (*)     Hgb 10.8 (*)     Hct 34.3 (*)     MCHC 31.5 (*)     All other components within normal limits   CBC W/ AUTO DIFFERENTIAL    Narrative:     The following orders were created for panel order CBC auto differential.  Procedure                               Abnormality         Status                     ---------                               -----------         ------                     CBC with Differential[[id number]]       Abnormal            Final result                 Please view results for these tests on the individual orders.          Imaging Results    None          Medications   sodium chloride 0.9% flush 10 mL (has no administration in time range)   melatonin tablet 6 mg (has no administration in time range)   morphine injection 2 mg (has no administration in time range)   morphine injection 4 mg (has no administration in time range)   ondansetron injection 4 mg (has no administration in  time range)   ceFAZolin (ANCEF) 1 g in dextrose 5 % in water (D5W) 50 mL IVPB (MB+) (0 g Intravenous Stopped 11/23/23 0503)   prochlorperazine injection Soln 5 mg (has no administration in time range)   polyethylene glycol packet 17 g (has no administration in time range)   acetaminophen tablet 650 mg (has no administration in time range)   simethicone chewable tablet 80 mg (has no administration in time range)   aluminum-magnesium hydroxide-simethicone 200-200-20 mg/5 mL suspension 30 mL (has no administration in time range)   naloxone 0.4 mg/mL injection 0.02 mg (has no administration in time range)   enoxaparin injection 40 mg (has no administration in time range)   EScitalopram oxalate tablet 20 mg (has no administration in time range)   levothyroxine tablet 100 mcg (has no administration in time range)   liothyronine tablet 5 mcg (has no administration in time range)   losartan tablet 25 mg (has no administration in time range)   methocarbamoL tablet 750 mg (750 mg Oral Given 11/23/23 0024)   oxyCODONE-acetaminophen  mg per tablet 1 tablet (1 tablet Oral Given 11/23/23 0024)   atorvastatin tablet 80 mg (has no administration in time range)   lactated ringers infusion ( Intravenous New Bag 11/23/23 0313)   labetaloL injection 10 mg (has no administration in time range)   hydrALAZINE injection 10 mg (has no administration in time range)   losartan tablet 25 mg (25 mg Oral Given 11/22/23 2001)     Medical Decision Making  Amount and/or Complexity of Data Reviewed  Labs: ordered.               ED Course as of 11/23/23 0714 Wed Nov 22, 2023 1842 Paging Dr. Corona who is on call [GM]   1906 Spoke with Dr. Corona who recommends admission to the hospitalist at [facility], NPO at midnight, likely washout in the morning.  [GM]   1907 Message left with  service [GM]   1926 Edelmira with  returning page; agrees with admission to Dr Silverman for medical management  [MA]      ED Course User Index  [GM] Ivone Yadav,  MD  [MA] Richard Al, LILIAN                        Clinical Impression:  Final diagnoses:  [T81.30XA] Wound dehiscence (Primary)          ED Disposition Condition    Transfer to Another Facility Stable                Ivone Yadav MD  11/23/23 0784

## 2023-11-23 NOTE — ANESTHESIA PREPROCEDURE EVALUATION
11/23/2023  Briseyda Torres is a 63 y.o., female with htn, h/o dvt, recent patella fracture repair and other medical problems listed in the EMR now with wound dehiscence      Pre-op Assessment    I have reviewed the Patient Summary Reports.     I have reviewed the Nursing Notes. I have reviewed the NPO Status.   I have reviewed the Medications.     Review of Systems  Cardiovascular:  Exercise tolerance: good                                               Physical Exam  General: Well nourished and Cooperative    Airway:  Mallampati: II   Mouth Opening: Normal  TM Distance: Normal  Tongue: Normal  Neck ROM: Normal ROM    Dental:  Intact    Chest/Lungs:  Clear to auscultation    Heart:  Rhythm: Regular Rhythm        Anesthesia Plan  Type of Anesthesia, risks & benefits discussed:    Anesthesia Type: Gen Supraglottic Airway  Intra-op Monitoring Plan: Standard ASA Monitors  Post Op Pain Control Plan: multimodal analgesia  Induction:  IV  Informed Consent: Informed consent signed with the Patient and all parties understand the risks and agree with anesthesia plan.  All questions answered.   ASA Score: 2  Day of Surgery Review of History & Physical: H&P Update referred to the surgeon/provider.    Ready For Surgery From Anesthesia Perspective.     .I explained anesthesia plan to patient/responsbile party if available.  Anesthesia consent done going over the material facts, risks, complications & alternatives, obtained which includes the possibility of altering the anesthesia plan.  I reviewed problem list, prior to admission medication list, appropriate labs, any workup, Xray, EKG etc noted below.  Patients condition is satisfactory to proceed with anesthesia plan unless otherwise noted (see anesthesia chart for details of the anesthesia plan carried out).      Pre-operative evaluation for Procedure(s)  "(LRB):  INCISION AND DRAINAGE, LOWER EXTREMITY (Left)    BP (!) 166/91 (BP Location: Left arm, Patient Position: Lying)   Pulse 64   Temp 36.6 °C (97.8 °F) (Oral)   Resp 17   Ht 5' 6" (1.676 m)   Wt 93.5 kg (206 lb 2.1 oz)   SpO2 99%   Breastfeeding No   BMI 33.27 kg/m²     Patient Active Problem List   Diagnosis    Chest pain    SOB (shortness of breath)    Anxiety    Closed displaced comminuted fracture of left patella    Acute deep vein thrombosis (DVT) of left lower extremity       Review of patient's allergies indicates:   Allergen Reactions    Azithromycin     Levofloxacin        Current Outpatient Medications   Medication Instructions    ALPRAZolam (XANAX) 1 mg, Oral, Nightly    EPINEPHrine (EPIPEN) 0.3 mg/0.3 mL AtIn ADMINISTER 1 PEN INJECTOR IN THE MUSCLE AS NEEDED FOR ANAPHYLAXIS    EScitalopram oxalate (LEXAPRO) 20 mg, Oral, Nightly    levothyroxine (SYNTHROID) 100 mcg, Oral, Daily    liothyronine (CYTOMEL) 5 mcg, Oral, Every morning    losartan (COZAAR) 25 mg, Oral, Nightly    methocarbamoL (ROBAXIN) 750 MG Tab 1 po tid As needed for spasms    mv-min/iron/folic/calcium/vitK (WOMEN'S 50 PLUS MULTIVIT-IRON ORAL) 1 tablet, Oral, Daily    oxyCODONE-acetaminophen (PERCOCET)  mg per tablet 1 tablet, Oral, Every 8 hours PRN    promethazine (PHENERGAN) 25 mg, Oral, Every 6 hours PRN    pseudoephedrine (SUDAFED) 120 mg, Oral, 2 times daily PRN    rivaroxaban (XARELTO) 15 mg (42)- 20 mg (9) tablet dose pack Take 1 tablet (15 mg) by mouth twice daily with food for 21 days followed by 1 tablet (20 mg) by mouth once daily with food    rosuvastatin (CRESTOR) 20 mg, Oral, Daily    sumatriptan (IMITREX) 25 MG Tab 1 tablet, Oral, Daily PRN       Past Surgical History:   Procedure Laterality Date    CATARACT EXTRACTION      CHOLECYSTECTOMY      COLONOSCOPY      ESOPHAGOGASTRODUODENOSCOPY      HYSTERECTOMY      LUMBAR SPINE SURGERY      OPEN REDUCTION AND INTERNAL FIXATION (ORIF) OF FRACTURE OF PATELLA Left " "10/27/2023    Procedure: ORIF, FRACTURE, PATELLA, LEFT;  Surgeon: Curtis Corona MD;  Location: [facility];  Service: Orthopedics;  Laterality: Left;  Supine, vascular bed, bone foam, tourniquet  1st case  Synthes small CCHS, 18g wire, locking patella plates    ROTATOR CUFF REPAIR Bilateral     total of 4 surgeries    THYROIDECTOMY      TOTAL HIP ARTHROPLASTY Right     TOTAL KNEE ARTHROPLASTY Right     c       Social History     Socioeconomic History    Marital status:    Tobacco Use    Smoking status: Never    Smokeless tobacco: Never   Substance and Sexual Activity    Alcohol use: Never    Drug use: Yes     Types: Marijuana     Comment: "medical marijuana"   Social History Narrative    ** Merged History Encounter **            Lab Results   Component Value Date    WBC 5.92 11/23/2023    HGB 9.9 (L) 11/23/2023    HCT 32.6 (L) 11/23/2023    MCV 95.0 (H) 11/23/2023     11/23/2023          BMP  Lab Results   Component Value Date    HCT 32.6 (L) 11/23/2023     11/23/2023    K 3.8 11/23/2023    BUN 11.3 11/23/2023    CREATININE 0.82 11/23/2023    CALCIUM 8.5 11/23/2023        INR  Recent Labs     11/22/23  1851 11/23/23  0526   INR 2.0 1.5*   PROTIME 22.4* 17.6*           Diagnostic Studies:      EKG:  Results for orders placed or performed in visit on 07/15/23   EKG 12-lead    Collection Time: 07/15/23 12:13 AM    Narrative    Test Reason :     Vent. Rate : 062 BPM     Atrial Rate : 062 BPM     P-R Int : 144 ms          QRS Dur : 076 ms      QT Int : 418 ms       P-R-T Axes : 088 077 075 degrees     QTc Int : 424 ms    Normal sinus rhythm  Normal ECG  No previous ECGs available  Confirmed by Lynnette MORGAN, Sebastián ([id number]) on 7/16/2023 1:06:27 PM    Referred By: SHERIE   SELF           Confirmed By:Sebastián Church MD              "

## 2023-11-23 NOTE — BRIEF OP NOTE
[facility] - Ortho Neuro  Brief Operative Note    SUMMARY     Surgery Date: 11/23/2023     Surgeon(s) and Role:     * Curtis Corona MD - Primary    Assisting Surgeon: None    Pre-op Diagnosis:  left knee surgical wound dehiscence; left knee hematoma    Post-op Diagnosis:  Same    Procedure(s) (LRB): Closure of left knee wound dehiscence- 83118  INCISION AND DRAINAGE, LOWER EXTREMITY (Left)- evacuation of hematoma left knee      Anesthesia: General    Implants:  Implant Name Type Inv. Item Serial No.  Lot No. LRB No. Used Action   ACTIVATED COLLAGEN CELLERATE      Left 1 Implanted       Operative Findings: 50mL    Estimated Blood Loss: yes    Estimated Blood Loss has been documented.         Specimens:   Specimen (24h ago, onward)      None            [id number]  A/P: Tolerated procedure well. Admit to floor. WBAT to LLE in full extension. Provena in place, leave for 7 days. Will need to discuss anticoagulation management for LLE DVT with primary team, having bleeding complications with xarelto. Hold for now and restart tomorrow. 24 hrs of IV abx.         Curtis Corona MD  Orthopedic Trauma  [facility]

## 2023-11-23 NOTE — NURSING
Nurses Note -- 4 Eyes      11/23/2023   1:37 AM      Skin assessed during: Admit      [] No Altered Skin Integrity Present    []Prevention Measures Documented      [x] Yes- Altered Skin Integrity Present or Discovered   [] LDA Added if Not in Epic (Describe Wound)   [] New Altered Skin Integrity was Present on Admit and Documented in LDA   [x] Wound Image Taken    Wound Care Consulted? Yes    Attending Nurse:  Nita Meza RN/Staff Member:   AIDE Su

## 2023-11-23 NOTE — OP NOTE
[facility]                       [address] [place]                      [place] [address]    PATIENT NAME:      ROSY ASHRAF  YOB: 1960  CSN:               [id number]  MRN:               [id number]  ADMIT DATE:        11/22/2023 18:00:00  PHYSICIAN:         Curtis Corona MD                          OPERATIVE REPORT      DATE OF SURGERY:    11/23/2023 00:00:00    SURGEON:  Curtis Corona MD    PREOPERATIVE DIAGNOSES:    1. Surgical wound dehiscence, left knee.  2. Left knee hematoma.    POSTOPERATIVE DIAGNOSES:    1. Surgical wound dehiscence, left knee.  2. Left knee hematoma.    PROCEDURE:    1. Direct primary closure of left knee surgical wound dehiscence.  2. Evacuation of hematoma, left knee.    ANESTHESIA:  General.    ESTIMATED BLOOD LOSS:  50 mL.    COMPLICATIONS:  None.    COUNTS:  All counts correct x2 at the end of the case.    INDICATIONS FOR PROCEDURE:  The patient is a 63-year-old female known to me   after a surgery on a left knee patella fracture, ORIF 1 month ago.  She had left   lower extremity DVT.  She had swelling and hematoma formation and has developed   a wound dehiscence to the left knee.  She has no signs or symptoms of   infection.  She has had no wound drainage.  She has had some bleeding   complication including bloody nose and gums due to her oral anticoagulation for   her DVT.  She has been unable to follow up with her cardiologist to discuss   management of her oral anticoagulation.  She reported to the emergency room last   night after she changed her bandages and found that her wound had opened up.    She was seen and evaluated and admitted to the hospital medicine service.  She   will need evaluation for management and changing of her oral anticoagulation.    The risks and benefits of treatment were discussed regarding her left knee.  She   has been brought to the operating room for irrigation and  debridement of the   hematoma to the knee as well as closure of her knee with surgical wound   dehiscence.    PROCEDURE IN DETAIL:  After informed consent was obtained, the patient was met   in the preoperative holding area and the site was marked.  She was taken to the   operating room.  She was placed supine on the operating room table and general   anesthesia was induced.  All bony prominences were well padded.  Preoperative   antibiotics were given.  Her left lower extremity was prepped and draped in   standard sterile fashion.  Time-out was done indicating correct operative limb   and procedure.  She had near complete dehiscence of her wound down to the   subcutaneous bursal tissue.  No exposed hardware was noted.  She had no   purulence and no significant drainage from the wound.  The wound edges were   necrotic appearing.  We were able to ellipse the wound edges sharply using a 10   blade.  She did have an abundance of hematoma that was buildup beneath her   incision over her patella fixation.  This was evacuated thoroughly.  Her   subcutaneous tissue and bursa were debrided and this exposed her hardware   beneath, it was all intact.  Three liters of normal saline were used to irrigate   the knee followed by an L of Xperience antimicrobial solution.  We were able to   scrub the hardware clean.  All hematoma was evacuated.  She had no active   bleeding.  She has good bleeding from her wound edges after ellipsing the wound   edge.  A gram of vancomycin powder was placed deep within the wound.  A layered   closure was then performed using a # 1 PDS, 2-0 Monocryl, and 2-0 Prolene with   intermittent staples.  A Prevena wound VAC was applied over the anterior aspect   of the knee.  She was wrapped with an Ace, placed into her hinged knee brace,   locked in full extension, awakened, extubated, and taken to recovery in stable   condition.    POSTOPERATIVE PLAN:  She will be admitted to the floor.  She can weightbear  as   tolerated left lower extremity.  She will keep the Prevena wound VAC in place   for a week, antibiotics for 24 hours.  We will hold off on anticoagulation at   this time until evaluation by the primary team to discuss a change in management   due to her bleeding complications with the Xarelto.  She is currently taking 30   mg a day for left lower extremity DVT.      ______________________________  MD JUSTINO Horan/SALLY  DD:  11/23/2023  Time:  08:43AM  DT:  11/23/2023  Time:  09:05AM  Job #:  [id number]      OPERATIVE REPORT

## 2023-11-23 NOTE — ANESTHESIA PROCEDURE NOTES
Intubation    Date/Time: 11/23/2023 7:24 AM    Performed by: August Figueroa CRNA  Authorized by: Kash Ferrell MD    Intubation:     Induction:  Intravenous    Intubated:  Postinduction    Mask Ventilation:  Easy mask    Attempts:  1    Attempted By:  CRNA    Difficult Airway Encountered?: No      Complications:  None    Airway Device:  Supraglottic airway/LMA    Airway Device Size:  4.0    Style/Cuff Inflation:  Cuffed    Placement Verified By:  Capnometry    Complicating Factors:  None    Findings Post-Intubation:  BS equal bilateral and atraumatic/condition of teeth unchanged

## 2023-11-23 NOTE — TRANSFER OF CARE
"Anesthesia Transfer of Care Note    Patient: Briseyda Torres    Procedure(s) Performed: Procedure(s) (LRB):  INCISION AND DRAINAGE, LOWER EXTREMITY (Left)    Patient location: PACU    Anesthesia Type: general    Transport from OR: Transported from OR on room air with adequate spontaneous ventilation    Post pain: adequate analgesia    Post assessment: no apparent anesthetic complications and tolerated procedure well    Post vital signs: stable    Level of consciousness: awake and alert    Nausea/Vomiting: no nausea/vomiting    Complications: none    Transfer of care protocol was followed      Last vitals: Visit Vitals  BP (!) 166/91 (BP Location: Left arm, Patient Position: Lying)   Pulse 64   Temp 36.6 °C (97.8 °F) (Oral)   Resp 17   Ht 5' 6" (1.676 m)   Wt 93.5 kg (206 lb 2.1 oz)   SpO2 99%   Breastfeeding No   BMI 33.27 kg/m²     "

## 2023-11-23 NOTE — ANESTHESIA POSTPROCEDURE EVALUATION
Anesthesia Post Evaluation    Patient: Briseyda Torres    Procedure(s) Performed: Procedure(s) (LRB):  INCISION AND DRAINAGE, LOWER EXTREMITY (Left)    Final Anesthesia Type: general (/Regional//MAC)      Patient location during evaluation: PACU  Post-procedure mental status: @ basline.  Pain management: adequate    PONV status: See postop meds for drugs used to control n/v if any.  Anesthetic complications: no      Cardiovascular status: blood pressure returned to baseline  Respiratory status: @ baseline.  Hydration status: euvolemic            Vitals Value Taken Time   /83 11/23/23 1017   Temp 36.9 °C (98.4 °F) 11/23/23 1017   Pulse 94 11/23/23 1017   Resp 12 11/23/23 1000   SpO2 97 % 11/23/23 1017         Event Time   Out of Recovery 11/23/2023 10:00:00         Pain/Delma Score: Pain Rating Prior to Med Admin: 6 (11/23/2023  9:55 AM)  Pain Rating Post Med Admin: 2 (11/23/2023  1:00 AM)  Delma Score: 8 (11/23/2023 10:00 AM)

## 2023-11-23 NOTE — H&P
[facility] Hospital Medicine - H&P Note    Patient Name: Briseyda Torres  : 1960  MRN: [id number]  PCP: Deo Jacobs MD  Admitting Physician: Messi Silverman MD  Admission Class: IP- Inpatient   Length of Stay: 0  Face-to-Face encounter date: 2023  Code status: --Full    Chief Complaint   Wound dehiscence    History of Present Illness   Ms. Torres is a 63 year old female with a pmh of HTN, HLD, lupus, CVA, migraines, fibromyalgia, anxiety/depression, LLE DVT who presented to [facility] ED with complaints of wound dehiscence to her left knee.  She states that she had an ORIF performed last month for a patella fracture. She states that she has been changing the bandages as directed and has noticed that over time, they have been becoming more and more bloody.  Today, when she went to change her bandage, she noticed that her sugical site incision had opened up. She called Dr. Corona's office and they suggested she present to [facility] ED. She denies any fever, chills, or drainage.    ED vitals on arrival:  Temp 98.4° F, pulse 87, resp 20, /92, SpO2 97% on RA.  ED lab work revealed H&H 10.8/34.3, sed rate 32, PT 22.4, APTT 69.9, CRP 8.76. Orthopedics was consulted. She was started on Ancef 1 g q.8 hours, transferred to [facility] and admitted to Hospital Medicine for management.      ROS   Except as documented, all other systems reviewed and negative     Past Medical History     Hypertension  Hyperlipidemia  Lupus   CVA   Migraines   Fibromyalgia   Anxiety/depression   Hypothyroidism   Migraines   DVT on Xarelto    Past Surgical History     Past Surgical History:   Procedure Laterality Date    CATARACT EXTRACTION      CHOLECYSTECTOMY      COLONOSCOPY      ESOPHAGOGASTRODUODENOSCOPY      HYSTERECTOMY      LUMBAR SPINE SURGERY      OPEN REDUCTION AND INTERNAL FIXATION (ORIF) OF FRACTURE OF PATELLA Left 10/27/2023    Procedure: ORIF, FRACTURE, PATELLA, LEFT;  Surgeon: Curtis Corona  MD OLIVE;  Location: [facility];  Service: Orthopedics;  Laterality: Left;  Supine, vascular bed, bone foam, tourniquet  1st case  Synthes small CCHS, 18g wire, locking patella plates    ROTATOR CUFF REPAIR Bilateral     total of 4 surgeries    THYROIDECTOMY      TOTAL HIP ARTHROPLASTY Right     TOTAL KNEE ARTHROPLASTY Right     c       Social History     Social History     Tobacco Use    Smoking status: Never    Smokeless tobacco: Never   Substance Use Topics    Alcohol use: Never        Family History   Reviewed and negative    Allergies   Azithromycin and Levofloxacin    Home Medications     Prior to Admission medications    Medication Sig Start Date End Date Taking? Authorizing Provider   ALPRAZolam (XANAX) 1 MG tablet Take 1 mg by mouth every evening. 10/2/23   Provider, Historical   EPINEPHrine (EPIPEN) 0.3 mg/0.3 mL AtIn ADMINISTER 1 PEN INJECTOR IN THE MUSCLE AS NEEDED FOR ANAPHYLAXIS 7/17/23   Provider, Historical   EScitalopram oxalate (LEXAPRO) 10 MG tablet Take 20 mg by mouth every evening. 6/22/23   Provider, Historical   levothyroxine (SYNTHROID) 100 MCG tablet Take 100 mcg by mouth once daily. 6/2/23   Provider, Historical   liothyronine (CYTOMEL) 5 MCG Tab Take 5 mcg by mouth every morning. 6/5/23   Provider, Historical   losartan (COZAAR) 25 MG tablet Take 25 mg by mouth every evening. 6/13/23   Provider, Historical   methocarbamoL (ROBAXIN) 750 MG Tab 1 po tid As needed for spasms 10/27/23   Curtis Corona MD   mv-min/iron/folic/calcium/vitK (WOMEN'S 50 PLUS MULTIVIT-IRON ORAL) Take 1 tablet by mouth once daily.    Provider, Historical   oxyCODONE-acetaminophen (PERCOCET)  mg per tablet Take 1 tablet by mouth every 8 (eight) hours as needed for Pain. 11/17/23 11/24/23  Curtis Corona MD   promethazine (PHENERGAN) 25 MG tablet Take 25 mg by mouth every 6 (six) hours as needed for Nausea. 8/1/23   Provider, Historical   pseudoephedrine (SUDAFED) 120 mg 12 hr tablet Take 120 mg by mouth 2 (two)  "times daily as needed for Congestion.    Provider, Historical   rivaroxaban (XARELTO) 15 mg (42)- 20 mg (9) tablet dose pack Take 1 tablet (15 mg) by mouth twice daily with food for 21 days followed by 1 tablet (20 mg) by mouth once daily with food 10/30/23   Curtis Corona MD   rosuvastatin (CRESTOR) 20 MG tablet Take 20 mg by mouth once daily. 6/22/23   Provider, Historical   sumatriptan (IMITREX) 25 MG Tab Take 1 tablet by mouth daily as needed for Migraine. 10/2/23   Provider, Historical        Physical Exam   Vital Signs  Temp:  [98.4 °F (36.9 °C)-98.6 °F (37 °C)]   Pulse:  [61-87]   Resp:  [20]   BP: (152-177)/(82-92)   SpO2:  [95 %-100 %]    General: Well developed, well nourished. In no acute distress, non-toxic appearing  HEENT: NC/AT  Neck:  Supple. No JVD  Chest: Clear bilaterally, no wheezing, no accessory muscle use.  CVS: Regular rhythm. Normal S1/S2.  Abdomen: nondistended, normoactive BS, soft and non-tender.  MSK: Left knee surgical site dehiscence through the mid third of the incision.  No surrounding erythema or warmth. Bloody drainage noted on bandage.  Skin: Warm and dry  Neuro: AAOx3, no focal neurological deficit  Psych: Cooperative      Labs     Recent Labs     11/22/23 1851   WBC 7.02   RBC 3.74*   HGB 10.8*   HCT 34.3*   MCV 91.7   MCH 28.9   MCHC 31.5*   RDW 15.1      SEDRATE 32*   HSCRP 8.76*     Recent Labs     11/22/23 1851   PROTIME 22.4*   INR 2.0   PTT 69.9*      Recent Labs     11/22/23 1851      K 4.3   CHLORIDE 107   CO2 30   BUN 11.1   CREATININE 1.11*   EGFRNORACEVR 56   GLUCOSE 112   CALCIUM 9.9   ALBUMIN 4.0   GLOBULIN 3.5   ALKPHOS 101   ALT 14   AST 23   BILITOT 0.3     No results for input(s): "LACTIC" in the last 72 hours.  No results for input(s): "CPK", "TROPONINI" in the last 72 hours.     Microbiology Results (last 7 days)       ** No results found for the last 168 hours. **           Imaging     No orders to display     Assessment & Plan   Surgical " site dehiscence    History: HTN, HLD, lupus, CVA, migraines, fibromyalgia, anxiety/depression, LLE DVT    Plan:  -Orthopedics consulted at [facility] ED  -Wound care consult  -Plan for surgery tomorrow   -NPO after midnight  -Pain control  -Ancef 1 G q 8 hours  -Hold Xarelto for now  -Antihypertensives as needed  -Resume home meds as appropriate  -Labs in AM       VTE Prophylaxis: Joy, Lovenox       I, Edelmira Little, ALEKSANDR have reviewed and discussed this case with Dr. Silverman.  Please see addendum for further assessment and plan from attending MD.

## 2023-11-24 LAB
ALBUMIN SERPL-MCNC: 3.2 G/DL (ref 3.4–4.8)
ALBUMIN/GLOB SERPL: 1.2 RATIO (ref 1.1–2)
ALP SERPL-CCNC: 89 UNIT/L (ref 40–150)
ALT SERPL-CCNC: 10 UNIT/L (ref 0–55)
AST SERPL-CCNC: 23 UNIT/L (ref 5–34)
BASOPHILS # BLD AUTO: 0.02 X10(3)/MCL
BASOPHILS NFR BLD AUTO: 0.3 %
BILIRUB SERPL-MCNC: 0.3 MG/DL
BUN SERPL-MCNC: 6.2 MG/DL (ref 9.8–20.1)
CALCIUM SERPL-MCNC: 8 MG/DL (ref 8.4–10.2)
CHLORIDE SERPL-SCNC: 104 MMOL/L (ref 98–107)
CO2 SERPL-SCNC: 27 MMOL/L (ref 23–31)
CREAT SERPL-MCNC: 0.84 MG/DL (ref 0.55–1.02)
EOSINOPHIL # BLD AUTO: 0.31 X10(3)/MCL (ref 0–0.9)
EOSINOPHIL NFR BLD AUTO: 4.9 %
ERYTHROCYTE [DISTWIDTH] IN BLOOD BY AUTOMATED COUNT: 15 % (ref 11.5–17)
GFR SERPLBLD CREATININE-BSD FMLA CKD-EPI: >60 MLS/MIN/1.73/M2
GLOBULIN SER-MCNC: 2.6 GM/DL (ref 2.4–3.5)
GLUCOSE SERPL-MCNC: 100 MG/DL (ref 82–115)
HCT VFR BLD AUTO: 31.7 % (ref 37–47)
HGB BLD-MCNC: 9.7 G/DL (ref 12–16)
IMM GRANULOCYTES # BLD AUTO: 0.01 X10(3)/MCL (ref 0–0.04)
IMM GRANULOCYTES NFR BLD AUTO: 0.2 %
LYMPHOCYTES # BLD AUTO: 1.72 X10(3)/MCL (ref 0.6–4.6)
LYMPHOCYTES NFR BLD AUTO: 27 %
MAGNESIUM SERPL-MCNC: 2.1 MG/DL (ref 1.6–2.6)
MCH RBC QN AUTO: 28.7 PG (ref 27–31)
MCHC RBC AUTO-ENTMCNC: 30.6 G/DL (ref 33–36)
MCV RBC AUTO: 93.8 FL (ref 80–94)
MONOCYTES # BLD AUTO: 0.58 X10(3)/MCL (ref 0.1–1.3)
MONOCYTES NFR BLD AUTO: 9.1 %
NEUTROPHILS # BLD AUTO: 3.74 X10(3)/MCL (ref 2.1–9.2)
NEUTROPHILS NFR BLD AUTO: 58.5 %
NRBC BLD AUTO-RTO: 0 %
PLATELET # BLD AUTO: 225 X10(3)/MCL (ref 130–400)
PMV BLD AUTO: 10.1 FL (ref 7.4–10.4)
POTASSIUM SERPL-SCNC: 3.9 MMOL/L (ref 3.5–5.1)
PROT SERPL-MCNC: 5.8 GM/DL (ref 5.8–7.6)
RBC # BLD AUTO: 3.38 X10(6)/MCL (ref 4.2–5.4)
SODIUM SERPL-SCNC: 138 MMOL/L (ref 136–145)
WBC # SPEC AUTO: 6.38 X10(3)/MCL (ref 4.5–11.5)

## 2023-11-24 PROCEDURE — 63600175 PHARM REV CODE 636 W HCPCS: Performed by: NURSE PRACTITIONER

## 2023-11-24 PROCEDURE — 25000003 PHARM REV CODE 250: Performed by: NURSE PRACTITIONER

## 2023-11-24 PROCEDURE — 25000003 PHARM REV CODE 250: Performed by: INTERNAL MEDICINE

## 2023-11-24 PROCEDURE — 83735 ASSAY OF MAGNESIUM: CPT | Performed by: INTERNAL MEDICINE

## 2023-11-24 PROCEDURE — 97161 PT EVAL LOW COMPLEX 20 MIN: CPT

## 2023-11-24 PROCEDURE — 80053 COMPREHEN METABOLIC PANEL: CPT | Performed by: INTERNAL MEDICINE

## 2023-11-24 PROCEDURE — 11000001 HC ACUTE MED/SURG PRIVATE ROOM

## 2023-11-24 PROCEDURE — 25000003 PHARM REV CODE 250: Performed by: ORTHOPAEDIC SURGERY

## 2023-11-24 PROCEDURE — 63600175 PHARM REV CODE 636 W HCPCS: Performed by: ORTHOPAEDIC SURGERY

## 2023-11-24 PROCEDURE — 85025 COMPLETE CBC W/AUTO DIFF WBC: CPT | Performed by: INTERNAL MEDICINE

## 2023-11-24 RX ORDER — HYDROXYZINE PAMOATE 25 MG/1
25 CAPSULE ORAL EVERY 4 HOURS PRN
Status: DISCONTINUED | OUTPATIENT
Start: 2023-11-24 | End: 2023-11-27 | Stop reason: HOSPADM

## 2023-11-24 RX ADMIN — LIOTHYRONINE SODIUM 5 MCG: 5 TABLET ORAL at 06:11

## 2023-11-24 RX ADMIN — CEFAZOLIN SODIUM 2 G: 2 SOLUTION INTRAVENOUS at 10:11

## 2023-11-24 RX ADMIN — LOSARTAN POTASSIUM 25 MG: 25 TABLET, FILM COATED ORAL at 09:11

## 2023-11-24 RX ADMIN — ATORVASTATIN CALCIUM 80 MG: 40 TABLET, FILM COATED ORAL at 09:11

## 2023-11-24 RX ADMIN — METHOCARBAMOL 750 MG: 750 TABLET ORAL at 09:11

## 2023-11-24 RX ADMIN — OXYCODONE AND ACETAMINOPHEN 1 TABLET: 10; 325 TABLET ORAL at 09:11

## 2023-11-24 RX ADMIN — CEFAZOLIN SODIUM 2 G: 2 SOLUTION INTRAVENOUS at 05:11

## 2023-11-24 RX ADMIN — ESCITALOPRAM OXALATE 20 MG: 10 TABLET ORAL at 09:11

## 2023-11-24 RX ADMIN — OXYCODONE AND ACETAMINOPHEN 1 TABLET: 10; 325 TABLET ORAL at 04:11

## 2023-11-24 RX ADMIN — HYDROXYZINE PAMOATE 25 MG: 25 CAPSULE ORAL at 04:11

## 2023-11-24 RX ADMIN — OXYCODONE AND ACETAMINOPHEN 1 TABLET: 10; 325 TABLET ORAL at 05:11

## 2023-11-24 RX ADMIN — OXYCODONE AND ACETAMINOPHEN 1 TABLET: 10; 325 TABLET ORAL at 01:11

## 2023-11-24 RX ADMIN — CEFAZOLIN SODIUM 2 G: 2 SOLUTION INTRAVENOUS at 09:11

## 2023-11-24 RX ADMIN — CEFAZOLIN SODIUM 2 G: 2 SOLUTION INTRAVENOUS at 04:11

## 2023-11-24 RX ADMIN — SODIUM CHLORIDE, POTASSIUM CHLORIDE, SODIUM LACTATE AND CALCIUM CHLORIDE: 600; 310; 30; 20 INJECTION, SOLUTION INTRAVENOUS at 10:11

## 2023-11-24 RX ADMIN — METHOCARBAMOL 750 MG: 750 TABLET ORAL at 05:11

## 2023-11-24 RX ADMIN — HYDROXYZINE PAMOATE 25 MG: 25 CAPSULE ORAL at 09:11

## 2023-11-24 RX ADMIN — HYDROXYZINE PAMOATE 25 MG: 25 CAPSULE ORAL at 05:11

## 2023-11-24 RX ADMIN — LEVOTHYROXINE SODIUM 100 MCG: 100 TABLET ORAL at 09:11

## 2023-11-24 NOTE — PROGRESS NOTES
[facility] Medicine Progress Note        Chief Complaint: Inpatient Follow-up for wound dehiscence    HPI:   Ms. Torres is a 63 year old female with a pmh of HTN, HLD, lupus, CVA, migraines, fibromyalgia, anxiety/depression, LLE DVT 10/2023 who presented to [facility] ED with complaints of wound dehiscence to her left knee.  She states that she had an ORIF performed last month for a patella fracture. She states that she has been changing the bandages as directed and has noticed that over time, they have been becoming more and more bloody.  Today, when she went to change her bandage, she noticed that her sugical site incision had opened up. She called Dr. Corona's office and they suggested she present to [facility] ED. She denies any fever, chills, or drainage.     ED vitals on arrival:  Temp 98.4° F, pulse 87, resp 20, /92, SpO2 97% on RA.  ED lab work revealed H&H 10.8/34.3, sed rate 32, PT 22.4, APTT 69.9, CRP 8.76. Orthopedics was consulted. She was started on Ancef 1 g q.8 hours, transferred to [facility] and admitted to Hospital Medicine for management of wound days since.      Orthopedics recommended washout and holding of Xarelto.        Interval Hx:     Afebrile.  Comfortably resting.  Pain is well controlled.  Requesting for hydroxyzine to help with itching caused by pain medications.  She understands need to be on anticoagulation and she is unable to make a decision between Xarelto on Eliquis.  After a prolonged discussion she has agreed to try Eliquis and monitor response.      Underwent washout yesterday.  Hemoglobin 9.7, stable today.      Objective/physical exam:  Vitals:    11/24/23 0025 11/24/23 0320 11/24/23 0405 11/24/23 0445   BP: (!) 161/77 (!) 142/78     BP Location:       Patient Position:       Pulse: 73 74     Resp: 16  16    Temp: 98.1 °F (36.7 °C) 98.5 °F (36.9 °C)     TempSrc: Oral Oral     SpO2: 96% 98%  97%   Weight:       Height:         General: In no acute  distress, afebrile  Respiratory: Clear to auscultation bilaterally  Cardiovascular: S1, S2, no appreciable murmur  Abdomen: Soft, nontender, BS +  Extremities left knee surgical site dressed  Neurologic: Alert and oriented x4, moving all extremities with good strength     Lab Results   Component Value Date     11/24/2023    K 3.9 11/24/2023    CO2 27 11/24/2023    BUN 6.2 (L) 11/24/2023    CREATININE 0.84 11/24/2023    CALCIUM 8.0 (L) 11/24/2023      Lab Results   Component Value Date    ALT 10 11/24/2023    AST 23 11/24/2023    ALKPHOS 89 11/24/2023    BILITOT 0.3 11/24/2023      Lab Results   Component Value Date    WBC 6.38 11/24/2023    HGB 9.7 (L) 11/24/2023    HCT 31.7 (L) 11/24/2023    MCV 93.8 11/24/2023     11/24/2023           Medications:   atorvastatin  80 mg Oral Daily    ceFAZolin (ANCEF) IVPB  2 g Intravenous Q6H    EScitalopram oxalate  20 mg Oral QHS    levothyroxine  100 mcg Oral Daily    liothyronine  5 mcg Oral QAM    losartan  25 mg Oral QHS    methocarbamoL  750 mg Oral TID      acetaminophen, aluminum-magnesium hydroxide-simethicone, hydrALAZINE, HYDROmorphone, hydrOXYzine pamoate, labetaloL, melatonin, meperidine, morphine, naloxone, ondansetron, oxyCODONE-acetaminophen, oxyCODONE-acetaminophen, polyethylene glycol, prochlorperazine, simethicone, sodium chloride 0.9%     Assessment/Plan:    Surgical wound dehiscence, surgical site hematoma s/p washout 11/23/23  history of left lower extremity DVT oct/2023, on Xarelto- currently on hold    HX: HTN, HLD, chronic lupus, history of CVA, migraine, fibromyalgia, anxiety, depression    Plan:  - AC on hold, resume from Sunday. Need ac atleast for another month. Hb stable  - completing cefazolin.  -other home medications will be reviewed and renewed     Lovenox             James Frances MD

## 2023-11-24 NOTE — PT/OT/SLP EVAL
Physical Therapy Evaluation and Discharge Note    Patient Name:  Briseyda Torres   MRN:  [id number]    Recommendations:     Discharge therapy intensity: Low Intensity Therapy   Discharge Equipment Recommendations: none   Barriers to discharge: None    Assessment:     Briseyda Torres is a 63 y.o. female admitted with a medical diagnosis of Surgical wound dehiscence, initial encounter. .  At this time, patient is functioning at their prior level of function and does not require further acute PT services.     Recent Surgery: Procedure(s) (LRB):  INCISION AND DRAINAGE, LOWER EXTREMITY (Left) 1 Day Post-Op    Plan:     During this hospitalization, patient does not require further acute PT services.  Please re-consult if situation changes.      Subjective     Chief Complaint: brace rubbing up against wound  Patient/Family Comments/goals:   Pain/Comfort:       Patients cultural, spiritual, Nondenominational conflicts given the current situation:      Living Environment:  Pt lives with her  and has a ramp  Prior to admission, patients level of function was ind to mod ind.  Equipment used at home: shower chair, crutches, axillary.  DME owned (not currently used):Upon discharge, patient will have assistance from     Objective:     Communicated with nurse prior to session.  Patient found supine with PureWick, peripheral IV, wound vac upon PT entry to room.    General Precautions: Standard, fall    Orthopedic Precautions:LLE weight bearing as tolerated   Braces: Hinged knee brace  Respiratory Status: Room air  Blood Pressure:     Exams:  RLE ROM: WFL  RLE Strength: WFL  LLE ROM: WFL  LLE Strength: NT    Functional Mobility:  Bed Mobility:     Supine to Sit: independence  Sit to Supine: independence  Transfers:     Sit to Stand:  modified independence with axillary crutches  Bed to Chair: modified independence with  axillary crutches  using  Step Transfer  Gait: amb in room with crutches with wbat lle mod  ind    AM-PAC 6 CLICK MOBILITY  Total Score:        Treatment and Education:    Patient provided with verbal education education regarding POC.  Understanding was verbalized.     Patient left supine with all lines intact and call button in reach.    GOALS:   Multidisciplinary Problems       Physical Therapy Goals       Not on file                    History:     Past Medical History:   Diagnosis Date    Acute deep vein thrombosis (DVT) of left lower extremity, unspecified vein     Anxiety     Arthritis     Closed displaced comminuted fracture of left patella, initial encounter     Dependence on crutches     Depression     Fibromyalgia     HLD (hyperlipidemia)     Hypertension     Insomnia     Migraine     SOB (shortness of breath)     Stroke 2005    Systemic lupus erythematosus, organ or system involvement unspecified     Thyroid disease        Past Surgical History:   Procedure Laterality Date    CATARACT EXTRACTION      CHOLECYSTECTOMY      COLONOSCOPY      ESOPHAGOGASTRODUODENOSCOPY      HYSTERECTOMY      LUMBAR SPINE SURGERY      OPEN REDUCTION AND INTERNAL FIXATION (ORIF) OF FRACTURE OF PATELLA Left 10/27/2023    Procedure: ORIF, FRACTURE, PATELLA, LEFT;  Surgeon: Curtis Corona MD;  Location: [facility];  Service: Orthopedics;  Laterality: Left;  Supine, vascular bed, bone foam, tourniquet  1st case  Synthes small CCHS, 18g wire, locking patella plates    ROTATOR CUFF REPAIR Bilateral     total of 4 surgeries    THYROIDECTOMY      TOTAL HIP ARTHROPLASTY Right     TOTAL KNEE ARTHROPLASTY Right     c       Time Tracking:     PT Received On:    PT Start Time: 1225     PT Stop Time: 1248  PT Total Time (min): 23 min     Billable Minutes: Evaluation 23 11/24/2023

## 2023-11-24 NOTE — PLAN OF CARE
Pt is here for loi , she tells me she was at outpt therapy and plans to resume outpt therapy when  approves. Pt does not have home health and doesn't feel it will be needed  Pt tells me she has the equipment she needs and a ramp at home.  will assist if needed  PCP is Deo Jacobs  Pt has a provena that will be on for 7 days.   No needs.

## 2023-11-24 NOTE — NURSING
Initial visit, noting dressing intact to Left Lower extremity and EMR reviewed noting patient is s/p I and D with closure and placement of Prevena wound VAC dressing per ortho , discussed same with assigned nurse David , will defer further ortho for follow up .

## 2023-11-25 LAB
ALBUMIN SERPL-MCNC: 3.1 G/DL (ref 3.4–4.8)
ALBUMIN/GLOB SERPL: 1 RATIO (ref 1.1–2)
ALP SERPL-CCNC: 93 UNIT/L (ref 40–150)
ALT SERPL-CCNC: 7 UNIT/L (ref 0–55)
AST SERPL-CCNC: 17 UNIT/L (ref 5–34)
BASOPHILS # BLD AUTO: 0.02 X10(3)/MCL
BASOPHILS NFR BLD AUTO: 0.3 %
BILIRUB SERPL-MCNC: 0.3 MG/DL
BUN SERPL-MCNC: 6.9 MG/DL (ref 9.8–20.1)
CALCIUM SERPL-MCNC: 8.6 MG/DL (ref 8.4–10.2)
CHLORIDE SERPL-SCNC: 106 MMOL/L (ref 98–107)
CO2 SERPL-SCNC: 28 MMOL/L (ref 23–31)
CREAT SERPL-MCNC: 0.7 MG/DL (ref 0.55–1.02)
EOSINOPHIL # BLD AUTO: 0.38 X10(3)/MCL (ref 0–0.9)
EOSINOPHIL NFR BLD AUTO: 6.6 %
ERYTHROCYTE [DISTWIDTH] IN BLOOD BY AUTOMATED COUNT: 14.6 % (ref 11.5–17)
GFR SERPLBLD CREATININE-BSD FMLA CKD-EPI: >60 MLS/MIN/1.73/M2
GLOBULIN SER-MCNC: 3 GM/DL (ref 2.4–3.5)
GLUCOSE SERPL-MCNC: 93 MG/DL (ref 82–115)
HCT VFR BLD AUTO: 32 % (ref 37–47)
HGB BLD-MCNC: 9.8 G/DL (ref 12–16)
IMM GRANULOCYTES # BLD AUTO: 0.01 X10(3)/MCL (ref 0–0.04)
IMM GRANULOCYTES NFR BLD AUTO: 0.2 %
LYMPHOCYTES # BLD AUTO: 1.66 X10(3)/MCL (ref 0.6–4.6)
LYMPHOCYTES NFR BLD AUTO: 29 %
MAGNESIUM SERPL-MCNC: 2 MG/DL (ref 1.6–2.6)
MCH RBC QN AUTO: 28.4 PG (ref 27–31)
MCHC RBC AUTO-ENTMCNC: 30.6 G/DL (ref 33–36)
MCV RBC AUTO: 92.8 FL (ref 80–94)
MONOCYTES # BLD AUTO: 0.57 X10(3)/MCL (ref 0.1–1.3)
MONOCYTES NFR BLD AUTO: 9.9 %
NEUTROPHILS # BLD AUTO: 3.09 X10(3)/MCL (ref 2.1–9.2)
NEUTROPHILS NFR BLD AUTO: 54 %
NRBC BLD AUTO-RTO: 0 %
PLATELET # BLD AUTO: 223 X10(3)/MCL (ref 130–400)
PMV BLD AUTO: 10.1 FL (ref 7.4–10.4)
POTASSIUM SERPL-SCNC: 3.7 MMOL/L (ref 3.5–5.1)
PROT SERPL-MCNC: 6.1 GM/DL (ref 5.8–7.6)
RBC # BLD AUTO: 3.45 X10(6)/MCL (ref 4.2–5.4)
SODIUM SERPL-SCNC: 142 MMOL/L (ref 136–145)
WBC # SPEC AUTO: 5.73 X10(3)/MCL (ref 4.5–11.5)

## 2023-11-25 PROCEDURE — 25000003 PHARM REV CODE 250: Performed by: NURSE PRACTITIONER

## 2023-11-25 PROCEDURE — 63600175 PHARM REV CODE 636 W HCPCS: Performed by: ORTHOPAEDIC SURGERY

## 2023-11-25 PROCEDURE — 11000001 HC ACUTE MED/SURG PRIVATE ROOM

## 2023-11-25 PROCEDURE — 25000003 PHARM REV CODE 250: Performed by: INTERNAL MEDICINE

## 2023-11-25 PROCEDURE — 25000003 PHARM REV CODE 250: Performed by: ORTHOPAEDIC SURGERY

## 2023-11-25 PROCEDURE — 85025 COMPLETE CBC W/AUTO DIFF WBC: CPT | Performed by: INTERNAL MEDICINE

## 2023-11-25 PROCEDURE — 83735 ASSAY OF MAGNESIUM: CPT | Performed by: INTERNAL MEDICINE

## 2023-11-25 PROCEDURE — 80053 COMPREHEN METABOLIC PANEL: CPT | Performed by: INTERNAL MEDICINE

## 2023-11-25 PROCEDURE — 63600175 PHARM REV CODE 636 W HCPCS: Performed by: NURSE PRACTITIONER

## 2023-11-25 RX ADMIN — HYDROXYZINE PAMOATE 25 MG: 25 CAPSULE ORAL at 09:11

## 2023-11-25 RX ADMIN — HYDROXYZINE PAMOATE 25 MG: 25 CAPSULE ORAL at 02:11

## 2023-11-25 RX ADMIN — METHOCARBAMOL 750 MG: 750 TABLET ORAL at 02:11

## 2023-11-25 RX ADMIN — LOSARTAN POTASSIUM 25 MG: 25 TABLET, FILM COATED ORAL at 08:11

## 2023-11-25 RX ADMIN — SODIUM CHLORIDE, POTASSIUM CHLORIDE, SODIUM LACTATE AND CALCIUM CHLORIDE: 600; 310; 30; 20 INJECTION, SOLUTION INTRAVENOUS at 01:11

## 2023-11-25 RX ADMIN — LEVOTHYROXINE SODIUM 100 MCG: 100 TABLET ORAL at 08:11

## 2023-11-25 RX ADMIN — METHOCARBAMOL 750 MG: 750 TABLET ORAL at 08:11

## 2023-11-25 RX ADMIN — SODIUM CHLORIDE, POTASSIUM CHLORIDE, SODIUM LACTATE AND CALCIUM CHLORIDE: 600; 310; 30; 20 INJECTION, SOLUTION INTRAVENOUS at 08:11

## 2023-11-25 RX ADMIN — ATORVASTATIN CALCIUM 80 MG: 40 TABLET, FILM COATED ORAL at 08:11

## 2023-11-25 RX ADMIN — OXYCODONE AND ACETAMINOPHEN 1 TABLET: 10; 325 TABLET ORAL at 09:11

## 2023-11-25 RX ADMIN — OXYCODONE AND ACETAMINOPHEN 1 TABLET: 10; 325 TABLET ORAL at 02:11

## 2023-11-25 RX ADMIN — ESCITALOPRAM OXALATE 20 MG: 10 TABLET ORAL at 08:11

## 2023-11-25 RX ADMIN — LIOTHYRONINE SODIUM 5 MCG: 5 TABLET ORAL at 05:11

## 2023-11-25 RX ADMIN — CEFAZOLIN SODIUM 2 G: 2 SOLUTION INTRAVENOUS at 05:11

## 2023-11-25 NOTE — PROGRESS NOTES
[facility] Hospital Medicine Progress Note        Chief Complaint: Inpatient Follow-up for wound dehiscence    HPI:   Ms. Torres is a 63 year old female with a pmh of HTN, HLD, lupus, CVA, migraines, fibromyalgia, anxiety/depression, LLE DVT 10/2023 who presented to [facility] ED with complaints of wound dehiscence to her left knee.  She states that she had an ORIF performed last month for a patella fracture. She states that she has been changing the bandages as directed and has noticed that over time, they have been becoming more and more bloody.  Today, when she went to change her bandage, she noticed that her sugical site incision had opened up. She called Dr. Corona's office and they suggested she present to [facility] ED. She denies any fever, chills, or drainage.     ED vitals on arrival:  Temp 98.4° F, pulse 87, resp 20, /92, SpO2 97% on RA.  ED lab work revealed H&H 10.8/34.3, sed rate 32, PT 22.4, APTT 69.9, CRP 8.76. Orthopedics was consulted. She was started on Ancef 1 g q.8 hours, transferred to [facility] and admitted to Hospital Medicine for management of wound days since.      Orthopedics recommended washout and holding of Xarelto.        Interval Hx:     Blood pressure elevated.  Comfortably resting.  Pain is well controlled.  Doing well on room air.  Denies any fever, chills.  Wound care continued     HGB 9, currently stable.  She is off all anticoagulation    Objective/physical exam:  Vitals:    11/24/23 2115 11/24/23 2116 11/25/23 0000 11/25/23 0341   BP: (!) 159/73  (!) 151/81 (!) 156/76   Pulse:   68 79   Resp:  18 16 16   Temp:   98.7 °F (37.1 °C) 98.1 °F (36.7 °C)   TempSrc:       SpO2:   95% (!) 94%   Weight:       Height:         General: In no acute distress, afebrile  Respiratory: Clear to auscultation bilaterally  Cardiovascular: S1, S2, no appreciable murmur  Abdomen: Soft, nontender, BS +  Extremities left knee surgical site dressed  Neurologic: Alert and oriented  x4, moving all extremities with good strength     Lab Results   Component Value Date     11/25/2023    K 3.7 11/25/2023    CO2 28 11/25/2023    BUN 6.9 (L) 11/25/2023    CREATININE 0.70 11/25/2023    CALCIUM 8.6 11/25/2023      Lab Results   Component Value Date    ALT 7 11/25/2023    AST 17 11/25/2023    ALKPHOS 93 11/25/2023    BILITOT 0.3 11/25/2023      Lab Results   Component Value Date    WBC 5.73 11/25/2023    HGB 9.8 (L) 11/25/2023    HCT 32.0 (L) 11/25/2023    MCV 92.8 11/25/2023     11/25/2023           Medications:   atorvastatin  80 mg Oral Daily    ceFAZolin (ANCEF) IVPB  2 g Intravenous Q6H    EScitalopram oxalate  20 mg Oral QHS    levothyroxine  100 mcg Oral Daily    liothyronine  5 mcg Oral QAM    losartan  25 mg Oral QHS    methocarbamoL  750 mg Oral TID      acetaminophen, aluminum-magnesium hydroxide-simethicone, hydrALAZINE, HYDROmorphone, hydrOXYzine pamoate, labetaloL, melatonin, morphine, naloxone, ondansetron, oxyCODONE-acetaminophen, oxyCODONE-acetaminophen, polyethylene glycol, prochlorperazine, simethicone, sodium chloride 0.9%     Assessment/Plan:    Surgical wound dehiscence, surgical site hematoma s/p washout 11/23/23  history of left lower extremity DVT oct/2023, on Xarelto- currently on hold    HX: HTN, HLD, chronic lupus, history of CVA, migraine, fibromyalgia, anxiety, depression    Plan:  -HGB stable.  AC on hold.  Add Eliquis from tomorrow and monitor response  -analgesics as needed .  Physical Therapy, wound care  -home medications were reviewed                 James Frances MD

## 2023-11-25 NOTE — PROGRESS NOTES
[facility] Neuro  Orthopedics  Progress Note    Patient Name: Briseyda Torres  MRN: [id number]  Admission Date: 11/22/2023  Hospital Length of Stay: 3 days  Attending Provider: James Frances MD  Primary Care Provider: Deo Jacobs MD  Follow-up For: Procedure(s) (LRB):  INCISION AND DRAINAGE, LOWER EXTREMITY (Left)    Post-Operative Day: 2 Days Post-Op  Subjective:     Principal Problem:Surgical wound dehiscence, initial encounter    Principal Orthopedic Problem: 2 Days Post-Op   Wound dehiscence left knee    Interval History:  Patient is doing well resting in bed, comfortably.  Pain is controlled.  She is not worked with physical therapy yet.  She is 2 days S/P left knee I and D of wound dehiscence.  Region she underwent ORIF left patella fracture 1 month ago and developed postoperative wound dehiscence.  She was on Xarelto for a DVT.    Review of patient's allergies indicates:   Allergen Reactions    Azithromycin     Levofloxacin        Current Facility-Administered Medications   Medication    acetaminophen tablet 650 mg    aluminum-magnesium hydroxide-simethicone 200-200-20 mg/5 mL suspension 30 mL    atorvastatin tablet 80 mg    EScitalopram oxalate tablet 20 mg    hydrALAZINE injection 10 mg    HYDROmorphone (PF) injection 0.5 mg    hydrOXYzine pamoate capsule 25 mg    labetaloL injection 10 mg    lactated ringers infusion    levothyroxine tablet 100 mcg    liothyronine tablet 5 mcg    losartan tablet 25 mg    melatonin tablet 6 mg    methocarbamoL tablet 750 mg    morphine injection 4 mg    naloxone 0.4 mg/mL injection 0.02 mg    ondansetron injection 4 mg    oxyCODONE-acetaminophen  mg per tablet 1 tablet    oxyCODONE-acetaminophen 5-325 mg per tablet 1 tablet    polyethylene glycol packet 17 g    prochlorperazine injection Soln 5 mg    simethicone chewable tablet 80 mg    sodium chloride 0.9% flush 10 mL     Objective:     Vital Signs (Most Recent):  Temp: 99.3 °F  "(37.4 °C) (11/25/23 1200)  Pulse: 79 (11/25/23 1200)  Resp: 19 (11/25/23 1200)  BP: (!) 164/76 (11/25/23 1200)  SpO2: 98 % (11/25/23 1200) Vital Signs (24h Range):  Temp:  [98.1 °F (36.7 °C)-99.3 °F (37.4 °C)] 99.3 °F (37.4 °C)  Pulse:  [67-90] 79  Resp:  [16-19] 19  SpO2:  [94 %-98 %] 98 %  BP: (126-164)/(68-81) 164/76     Weight: 93.5 kg (206 lb 2.1 oz)  Height: 5' 6" (167.6 cm)  Body mass index is 33.27 kg/m².      Intake/Output Summary (Last 24 hours) at 11/25/2023 1255  Last data filed at 11/25/2023 0300  Gross per 24 hour   Intake --   Output 1600 ml   Net -1600 ml       Physical Exam:   Ortho/SPM Exam  General: awake, alert, oriented. Skin pink, warm, dry. Resp even and non labored. No distress noted.     Left lower extremity exam:   Prevena wound VAC in place with no output   Mild effusion  Hinged knee brace in place   Thigh and calf compartments soft and compressible   Intact dorsiflexion, plantar flexion   Positive FHL/EHL  Sensation intact distally  Pedal pulses 2+      Diagnostic Findings:   Significant Labs:   Recent Lab Results         11/25/23  0519   11/24/23  0449   11/23/23  0526   11/22/23  1851        Albumin/Globulin Ratio 1.0   1.2   1.3   1.1       Albumin 3.1   3.2   3.4   4.0       ALP 93   89   88   101       ALT 7   10   13   14       aPTT     53.9  Comment: For Minimal Heparin Infusion, the goal aPTT 64-85 seconds corresponds to an anti-Xa of 0.3-0.5.    For Low Intensity and High Intensity Heparin, the goal aPTT  seconds corresponds to an anti-Xa of 0.3-0.7   69.9  Comment: For Minimal Heparin Infusion, the goal aPTT 64-85 seconds corresponds to an anti-Xa of 0.3-0.5.    For Low Intensity and High Intensity Heparin, the goal aPTT  seconds corresponds to an anti-Xa of 0.3-0.7       AST 17   23   20   23       Baso # 0.02   0.02   0.03   0.02       Basophil % 0.3   0.3   0.5   0.3       BILIRUBIN TOTAL 0.3   0.3   0.3   0.3       BUN 6.9   6.2   11.3   11.1       Calcium 8.6   " 8.0   8.5   9.9       Chloride 106   104   105   107       CO2 28   27   26   30       Creatinine 0.70   0.84   0.82   1.11       CRP, High Sensitivity       8.76       eGFR >60   >60   >60   56       Eos # 0.38   0.31   0.28   0.25       Eosinophil % 6.6   4.9   4.7   3.6       Globulin, Total 3.0   2.6   2.7   3.5       Glucose 93   100   109   112       Hematocrit 32.0   31.7   32.6   34.3       Hemoglobin 9.8   9.7   9.9   10.8       Immature Grans (Abs) 0.01   0.01   0.01   0.01       Immature Granulocytes 0.2   0.2   0.2   0.1       INR     1.5   2.0       Lymph # 1.66   1.72   1.99   1.72       LYMPH % 29.0   27.0   33.6   24.5       Magnesium  2.00   2.10   2.20         MCH 28.4   28.7   28.9   28.9       MCHC 30.6   30.6   30.4   31.5       MCV 92.8   93.8   95.0   91.7       Mono # 0.57   0.58   0.55   0.61       Mono % 9.9   9.1   9.3   8.7       MPV 10.1   10.1   10.2   9.8       Neut # 3.09   3.74   3.06   4.41       Neut % 54.0   58.5   51.7   62.8       nRBC 0.0   0.0   0.0   0.0       Phosphorus Level     3.6         Platelet Count 223   225   240   280       Potassium 3.7   3.9   3.8   4.3       PROTEIN TOTAL 6.1   5.8   6.1   7.5       Protime     17.6   22.4       RBC 3.45   3.38   3.43   3.74       RDW 14.6   15.0   15.4   15.1       Sed Rate       32       Sodium 142   138   139   145       WBC 5.73   6.38   5.92   7.02                  Assessment/Plan:     Left knee wound dehiscence S/P I and D with placement of wound VAC    Orthopedically stable, continue with postoperative care   She will be full weight-bearing left lower extremity as tolerated with her hinged knee brace in place.  Continue with wound VAC for 5 more days.  Patient we will start Eliquis tomorrow for her DVT coverage.  Orthopedics will follow  Active Diagnoses:    Diagnosis Date Noted POA    PRINCIPAL PROBLEM:  Surgical wound dehiscence, initial encounter [T81.31XA] 11/23/2023 Yes    Hematoma of left knee region [S80.02XA]  11/23/2023 Yes      Problems Resolved During this Admission:

## 2023-11-26 LAB
ALBUMIN SERPL-MCNC: 3.2 G/DL (ref 3.4–4.8)
ALBUMIN/GLOB SERPL: 1 RATIO (ref 1.1–2)
ALP SERPL-CCNC: 88 UNIT/L (ref 40–150)
ALT SERPL-CCNC: 7 UNIT/L (ref 0–55)
AST SERPL-CCNC: 17 UNIT/L (ref 5–34)
BASOPHILS # BLD AUTO: 0.02 X10(3)/MCL
BASOPHILS NFR BLD AUTO: 0.4 %
BILIRUB SERPL-MCNC: 0.4 MG/DL
BUN SERPL-MCNC: 7.1 MG/DL (ref 9.8–20.1)
CALCIUM SERPL-MCNC: 8.8 MG/DL (ref 8.4–10.2)
CHLORIDE SERPL-SCNC: 105 MMOL/L (ref 98–107)
CO2 SERPL-SCNC: 27 MMOL/L (ref 23–31)
CREAT SERPL-MCNC: 0.76 MG/DL (ref 0.55–1.02)
EOSINOPHIL # BLD AUTO: 0.36 X10(3)/MCL (ref 0–0.9)
EOSINOPHIL NFR BLD AUTO: 6.3 %
ERYTHROCYTE [DISTWIDTH] IN BLOOD BY AUTOMATED COUNT: 14.7 % (ref 11.5–17)
GFR SERPLBLD CREATININE-BSD FMLA CKD-EPI: >60 MLS/MIN/1.73/M2
GLOBULIN SER-MCNC: 3.2 GM/DL (ref 2.4–3.5)
GLUCOSE SERPL-MCNC: 102 MG/DL (ref 82–115)
HCT VFR BLD AUTO: 31.6 % (ref 37–47)
HGB BLD-MCNC: 9.9 G/DL (ref 12–16)
IMM GRANULOCYTES # BLD AUTO: 0.01 X10(3)/MCL (ref 0–0.04)
IMM GRANULOCYTES NFR BLD AUTO: 0.2 %
LYMPHOCYTES # BLD AUTO: 1.66 X10(3)/MCL (ref 0.6–4.6)
LYMPHOCYTES NFR BLD AUTO: 29.1 %
MCH RBC QN AUTO: 28.5 PG (ref 27–31)
MCHC RBC AUTO-ENTMCNC: 31.3 G/DL (ref 33–36)
MCV RBC AUTO: 91.1 FL (ref 80–94)
MONOCYTES # BLD AUTO: 0.56 X10(3)/MCL (ref 0.1–1.3)
MONOCYTES NFR BLD AUTO: 9.8 %
NEUTROPHILS # BLD AUTO: 3.09 X10(3)/MCL (ref 2.1–9.2)
NEUTROPHILS NFR BLD AUTO: 54.2 %
NRBC BLD AUTO-RTO: 0 %
PLATELET # BLD AUTO: 206 X10(3)/MCL (ref 130–400)
PMV BLD AUTO: 10.1 FL (ref 7.4–10.4)
POTASSIUM SERPL-SCNC: 3.9 MMOL/L (ref 3.5–5.1)
PROT SERPL-MCNC: 6.4 GM/DL (ref 5.8–7.6)
RBC # BLD AUTO: 3.47 X10(6)/MCL (ref 4.2–5.4)
SODIUM SERPL-SCNC: 139 MMOL/L (ref 136–145)
WBC # SPEC AUTO: 5.7 X10(3)/MCL (ref 4.5–11.5)

## 2023-11-26 PROCEDURE — 11000001 HC ACUTE MED/SURG PRIVATE ROOM

## 2023-11-26 PROCEDURE — 85025 COMPLETE CBC W/AUTO DIFF WBC: CPT | Performed by: INTERNAL MEDICINE

## 2023-11-26 PROCEDURE — 25000003 PHARM REV CODE 250: Performed by: ORTHOPAEDIC SURGERY

## 2023-11-26 PROCEDURE — 80053 COMPREHEN METABOLIC PANEL: CPT | Performed by: INTERNAL MEDICINE

## 2023-11-26 PROCEDURE — 25000003 PHARM REV CODE 250: Performed by: INTERNAL MEDICINE

## 2023-11-26 PROCEDURE — 25000003 PHARM REV CODE 250: Performed by: NURSE PRACTITIONER

## 2023-11-26 RX ORDER — AMLODIPINE BESYLATE 2.5 MG/1
2.5 TABLET ORAL DAILY
Status: DISCONTINUED | OUTPATIENT
Start: 2023-11-26 | End: 2023-11-27 | Stop reason: HOSPADM

## 2023-11-26 RX ADMIN — APIXABAN 5 MG: 5 TABLET, FILM COATED ORAL at 08:11

## 2023-11-26 RX ADMIN — ATORVASTATIN CALCIUM 80 MG: 40 TABLET, FILM COATED ORAL at 08:11

## 2023-11-26 RX ADMIN — LOSARTAN POTASSIUM 25 MG: 25 TABLET, FILM COATED ORAL at 08:11

## 2023-11-26 RX ADMIN — OXYCODONE AND ACETAMINOPHEN 1 TABLET: 10; 325 TABLET ORAL at 06:11

## 2023-11-26 RX ADMIN — OXYCODONE AND ACETAMINOPHEN 1 TABLET: 10; 325 TABLET ORAL at 02:11

## 2023-11-26 RX ADMIN — HYDROXYZINE PAMOATE 25 MG: 25 CAPSULE ORAL at 08:11

## 2023-11-26 RX ADMIN — AMLODIPINE BESYLATE 2.5 MG: 2.5 TABLET ORAL at 08:11

## 2023-11-26 RX ADMIN — HYDROXYZINE PAMOATE 25 MG: 25 CAPSULE ORAL at 02:11

## 2023-11-26 RX ADMIN — OXYCODONE AND ACETAMINOPHEN 1 TABLET: 10; 325 TABLET ORAL at 03:11

## 2023-11-26 RX ADMIN — OXYCODONE AND ACETAMINOPHEN 1 TABLET: 10; 325 TABLET ORAL at 08:11

## 2023-11-26 RX ADMIN — ESCITALOPRAM OXALATE 20 MG: 10 TABLET ORAL at 08:11

## 2023-11-26 RX ADMIN — HYDROXYZINE PAMOATE 25 MG: 25 CAPSULE ORAL at 03:11

## 2023-11-26 RX ADMIN — LEVOTHYROXINE SODIUM 100 MCG: 100 TABLET ORAL at 08:11

## 2023-11-26 RX ADMIN — METHOCARBAMOL 750 MG: 750 TABLET ORAL at 02:11

## 2023-11-26 RX ADMIN — METHOCARBAMOL 750 MG: 750 TABLET ORAL at 08:11

## 2023-11-26 RX ADMIN — LIOTHYRONINE SODIUM 5 MCG: 5 TABLET ORAL at 06:11

## 2023-11-26 RX ADMIN — HYDROXYZINE PAMOATE 25 MG: 25 CAPSULE ORAL at 06:11

## 2023-11-26 NOTE — PROGRESS NOTES
Patient Name: Briseyda Torres  MRN: [id number]  Admission Date: 11/22/2023  Hospital Length of Stay: 4 days  Attending Provider: James Frances MD  Primary Care Provider: Deo Jacobs MD  Follow-up For: Procedure(s) (LRB):  INCISION AND DRAINAGE, LOWER EXTREMITY (Left)    Post-Operative Day: 3 Days Post-Op  Subjective:       Principal Orthopedic Problem: 3 Days Post-Op status post repair of surgical wound dehiscence left knee      Interval History:  She is doing well this morning.  Her wound VAC has a good seal and suction with no significant output.  She reports that her pain is controlled.  She has been able to mobilize and weight bear with her knee in full extension.  She states she is ready to go home today.    Review of patient's allergies indicates:   Allergen Reactions    Azithromycin     Levofloxacin        Current Facility-Administered Medications   Medication    acetaminophen tablet 650 mg    aluminum-magnesium hydroxide-simethicone 200-200-20 mg/5 mL suspension 30 mL    amLODIPine tablet 2.5 mg    apixaban tablet 5 mg    atorvastatin tablet 80 mg    EScitalopram oxalate tablet 20 mg    hydrALAZINE injection 10 mg    HYDROmorphone (PF) injection 0.5 mg    hydrOXYzine pamoate capsule 25 mg    labetaloL injection 10 mg    levothyroxine tablet 100 mcg    liothyronine tablet 5 mcg    losartan tablet 25 mg    melatonin tablet 6 mg    methocarbamoL tablet 750 mg    morphine injection 4 mg    naloxone 0.4 mg/mL injection 0.02 mg    ondansetron injection 4 mg    oxyCODONE-acetaminophen  mg per tablet 1 tablet    oxyCODONE-acetaminophen 5-325 mg per tablet 1 tablet    polyethylene glycol packet 17 g    prochlorperazine injection Soln 5 mg    simethicone chewable tablet 80 mg    sodium chloride 0.9% flush 10 mL     Objective:     Vital Signs (Most Recent):  Temp: 98.6 °F (37 °C) (11/26/23 1137)  Pulse: 81 (11/26/23 1137)  Resp: 18 (11/26/23 1137)  BP: (!) 158/75 (11/26/23 1137)  SpO2: 98 %  "(11/26/23 1137) Vital Signs (24h Range):  Temp:  [97 °F (36.1 °C)-98.8 °F (37.1 °C)] 98.6 °F (37 °C)  Pulse:  [72-81] 81  Resp:  [16-18] 18  SpO2:  [93 %-98 %] 98 %  BP: (130-166)/(69-90) 158/75     Weight: 93.5 kg (206 lb 2.1 oz)  Height: 5' 6" (167.6 cm)  Body mass index is 33.27 kg/m².      Intake/Output Summary (Last 24 hours) at 11/26/2023 1236  Last data filed at 11/26/2023 0515  Gross per 24 hour   Intake --   Output 2000 ml   Net -2000 ml       Physical Exam:   Ortho/SPM Exam    General the patient is alert and oriented x3 no acute distress nontoxic-appearing appropriate affect.    Constitutional: Vital signs are examined and stable.  Resp: No signs of labored breathing    Musculoskeletal Exam:  Left lower extremity:  Thigh soft and compressible.  No surrounding erythema.  Prevena wound VAC intact.  Palpable DP pulse.  Good range motion of the ankle and digits.    Diagnostic Findings:   Significant Labs: BMP:   Recent Labs   Lab 11/25/23  0519 11/26/23 0624    139   K 3.7 3.9   CO2 28 27   BUN 6.9* 7.1*   CREATININE 0.70 0.76   CALCIUM 8.6 8.8   MG 2.00  --      CBC:   Recent Labs   Lab 11/25/23 0519 11/26/23 0624   WBC 5.73 5.70   HGB 9.8* 9.9*   HCT 32.0* 31.6*    206     CMP:   Recent Labs   Lab 11/25/23  0519 11/26/23 0624    139   K 3.7 3.9   CO2 28 27   BUN 6.9* 7.1*   CREATININE 0.70 0.76   CALCIUM 8.6 8.8   ALBUMIN 3.1* 3.2*   BILITOT 0.3 0.4   ALKPHOS 93 88   AST 17 17   ALT 7 7     All pertinent labs within the past 24 hours have been reviewed.        Significant Imaging: I have reviewed all pertinent imaging results/findings.     Assessment/Plan:     Active Diagnoses:    Diagnosis Date Noted POA    PRINCIPAL PROBLEM:  Surgical wound dehiscence, initial encounter [T81.31XA] 11/23/2023 Yes    Hematoma of left knee region [S80.02XA] 11/23/2023 Yes      Problems Resolved During this Admission:     She is doing well today and I am happy with her progress.  She can begin oral " anticoagulation per hospital medicine service.  She would like to switch to Eliquis from her Xarelto due to her bleeding complications on the Xarelto.  Plan to have her follow-up with me on Thursday for removal of her wound VAC in monitoring of her wound.  We will plan to keep her sutures and staples in place with 3 weeks.  She will need to follow up with her primary care physician upon discharge in order to continue to monitor on the Eliquis due to her DVT.  She understands and agrees with all that we have discussed and all questions and concerns were addressed.  Please call with questions or concerns.    This note/OR report was created with the assistance of  voice recognition software or phone  dictation.  There may be transcription errors as a result of using this technology however minimal. Effort has been made to assure accuracy of transcription but any obvious errors or omissions should be clarified with the author of the document.         Curtis Corona MD  Orthopedic Trauma Surgery  [facility]

## 2023-11-26 NOTE — PROGRESS NOTES
[facility] Hospital Medicine Progress Note        Chief Complaint: Inpatient Follow-up for wound dehiscence    HPI:   Ms. Torres is a 63 year old female with a pmh of HTN, HLD, lupus, CVA, migraines, fibromyalgia, anxiety/depression, LLE DVT 10/2023 who presented to [facility] ED with complaints of wound dehiscence to her left knee.  She states that she had an ORIF performed last month for a patella fracture. She states that she has been changing the bandages as directed and has noticed that over time, they have been becoming more and more bloody.  Today, when she went to change her bandage, she noticed that her sugical site incision had opened up. She called Dr. Corona's office and they suggested she present to [facility] ED. She denies any fever, chills, or drainage.     ED vitals on arrival:  Temp 98.4° F, pulse 87, resp 20, /92, SpO2 97% on RA.  ED lab work revealed H&H 10.8/34.3, sed rate 32, PT 22.4, APTT 69.9, CRP 8.76. Orthopedics was consulted. She was started on Ancef 1 g q.8 hours, transferred to [facility] and admitted to Hospital Medicine for management of wound days since.      Orthopedics recommended washout and holding of Xarelto.  Underwent incision drainage and closure, evacuation of hematoma on 11/23/2023.  Orthopedics has cleared to resume anticoagulation.    Interval Hx:   Blood pressure elevated.  Afebrile.  Hemodynamically stable otherwise.  Doing well on room air.  Pain is well controlled, ambulating better.    labs this morning showed stable hemoglobin platelets, stable electrolytes      Objective/physical exam:  Vitals:    11/26/23 0025 11/26/23 0317 11/26/23 0417 11/26/23 0434   BP: (!) 145/69   (!) 157/81   Pulse: 81   75   Resp:  18 16    Temp:    98.8 °F (37.1 °C)   TempSrc:    Oral   SpO2:    (!) 93%   Weight:       Height:         General: In no acute distress, afebrile  Respiratory: Clear to auscultation bilaterally  Cardiovascular: S1, S2, no appreciable  murmur  Abdomen: Soft, nontender, BS +  Extremities left knee surgical site dressed  Neurologic: Alert and oriented x4, moving all extremities with good strength     Lab Results   Component Value Date     11/26/2023    K 3.9 11/26/2023    CO2 27 11/26/2023    BUN 7.1 (L) 11/26/2023    CREATININE 0.76 11/26/2023    CALCIUM 8.8 11/26/2023      Lab Results   Component Value Date    ALT 7 11/26/2023    AST 17 11/26/2023    ALKPHOS 88 11/26/2023    BILITOT 0.4 11/26/2023      Lab Results   Component Value Date    WBC 5.70 11/26/2023    HGB 9.9 (L) 11/26/2023    HCT 31.6 (L) 11/26/2023    MCV 91.1 11/26/2023     11/26/2023           Medications:   amLODIPine  2.5 mg Oral Daily    apixaban  5 mg Oral BID    atorvastatin  80 mg Oral Daily    EScitalopram oxalate  20 mg Oral QHS    levothyroxine  100 mcg Oral Daily    liothyronine  5 mcg Oral QAM    losartan  25 mg Oral QHS    methocarbamoL  750 mg Oral TID      acetaminophen, aluminum-magnesium hydroxide-simethicone, hydrALAZINE, HYDROmorphone, hydrOXYzine pamoate, labetaloL, melatonin, morphine, naloxone, ondansetron, oxyCODONE-acetaminophen, oxyCODONE-acetaminophen, polyethylene glycol, prochlorperazine, simethicone, sodium chloride 0.9%     Assessment/Plan:    Surgical wound dehiscence, surgical site hematoma s/p washout 11/23/23  history of left lower extremity DVT oct/2023, on Xarelto- currently on hold    HX: HTN, HLD, chronic lupus, history of CVA, migraine, fibromyalgia, anxiety, depression    Plan:  -HGB stable.  Resume anticoagulation, switch to Eliquis today.  Monitor for bleeding   -continue analgesics, therapy  -home medications were reviewed  -add low-dose amlodipine to optimize blood pressure control.  Continue losartan          James Frances MD

## 2023-11-27 VITALS
OXYGEN SATURATION: 97 % | RESPIRATION RATE: 18 BRPM | TEMPERATURE: 99 F | DIASTOLIC BLOOD PRESSURE: 81 MMHG | BODY MASS INDEX: 33.13 KG/M2 | WEIGHT: 206.13 LBS | SYSTOLIC BLOOD PRESSURE: 158 MMHG | HEIGHT: 66 IN | HEART RATE: 66 BPM

## 2023-11-27 PROBLEM — S80.02XA HEMATOMA OF LEFT KNEE REGION: Status: RESOLVED | Noted: 2023-11-23 | Resolved: 2023-11-27

## 2023-11-27 LAB
ALBUMIN SERPL-MCNC: 3.4 G/DL (ref 3.4–4.8)
ALBUMIN/GLOB SERPL: 1.3 RATIO (ref 1.1–2)
ALP SERPL-CCNC: 94 UNIT/L (ref 40–150)
ALT SERPL-CCNC: 7 UNIT/L (ref 0–55)
AST SERPL-CCNC: 21 UNIT/L (ref 5–34)
BASOPHILS # BLD AUTO: 0.02 X10(3)/MCL
BASOPHILS NFR BLD AUTO: 0.4 %
BILIRUB SERPL-MCNC: 0.3 MG/DL
BUN SERPL-MCNC: 9.1 MG/DL (ref 9.8–20.1)
CALCIUM SERPL-MCNC: 9 MG/DL (ref 8.4–10.2)
CHLORIDE SERPL-SCNC: 105 MMOL/L (ref 98–107)
CO2 SERPL-SCNC: 27 MMOL/L (ref 23–31)
CREAT SERPL-MCNC: 0.74 MG/DL (ref 0.55–1.02)
EOSINOPHIL # BLD AUTO: 0.39 X10(3)/MCL (ref 0–0.9)
EOSINOPHIL NFR BLD AUTO: 8.2 %
ERYTHROCYTE [DISTWIDTH] IN BLOOD BY AUTOMATED COUNT: 14.6 % (ref 11.5–17)
GFR SERPLBLD CREATININE-BSD FMLA CKD-EPI: >60 MLS/MIN/1.73/M2
GLOBULIN SER-MCNC: 2.7 GM/DL (ref 2.4–3.5)
GLUCOSE SERPL-MCNC: 93 MG/DL (ref 82–115)
HCT VFR BLD AUTO: 34.3 % (ref 37–47)
HGB BLD-MCNC: 10.7 G/DL (ref 12–16)
IMM GRANULOCYTES # BLD AUTO: 0.01 X10(3)/MCL (ref 0–0.04)
IMM GRANULOCYTES NFR BLD AUTO: 0.2 %
LYMPHOCYTES # BLD AUTO: 1.74 X10(3)/MCL (ref 0.6–4.6)
LYMPHOCYTES NFR BLD AUTO: 36.6 %
MAGNESIUM SERPL-MCNC: 2.1 MG/DL (ref 1.6–2.6)
MCH RBC QN AUTO: 28.4 PG (ref 27–31)
MCHC RBC AUTO-ENTMCNC: 31.2 G/DL (ref 33–36)
MCV RBC AUTO: 91 FL (ref 80–94)
MONOCYTES # BLD AUTO: 0.51 X10(3)/MCL (ref 0.1–1.3)
MONOCYTES NFR BLD AUTO: 10.7 %
NEUTROPHILS # BLD AUTO: 2.08 X10(3)/MCL (ref 2.1–9.2)
NEUTROPHILS NFR BLD AUTO: 43.9 %
NRBC BLD AUTO-RTO: 0 %
PLATELET # BLD AUTO: 203 X10(3)/MCL (ref 130–400)
PMV BLD AUTO: 9.9 FL (ref 7.4–10.4)
POTASSIUM SERPL-SCNC: 3.9 MMOL/L (ref 3.5–5.1)
PROT SERPL-MCNC: 6.1 GM/DL (ref 5.8–7.6)
RBC # BLD AUTO: 3.77 X10(6)/MCL (ref 4.2–5.4)
SODIUM SERPL-SCNC: 140 MMOL/L (ref 136–145)
WBC # SPEC AUTO: 4.75 X10(3)/MCL (ref 4.5–11.5)

## 2023-11-27 PROCEDURE — 25000003 PHARM REV CODE 250: Performed by: INTERNAL MEDICINE

## 2023-11-27 PROCEDURE — 83735 ASSAY OF MAGNESIUM: CPT | Performed by: INTERNAL MEDICINE

## 2023-11-27 PROCEDURE — 25000003 PHARM REV CODE 250: Performed by: NURSE PRACTITIONER

## 2023-11-27 PROCEDURE — 80053 COMPREHEN METABOLIC PANEL: CPT | Performed by: INTERNAL MEDICINE

## 2023-11-27 PROCEDURE — 85025 COMPLETE CBC W/AUTO DIFF WBC: CPT | Performed by: INTERNAL MEDICINE

## 2023-11-27 RX ORDER — METHOCARBAMOL 750 MG/1
750 TABLET, FILM COATED ORAL 3 TIMES DAILY
Qty: 20 TABLET | Refills: 0 | Status: SHIPPED | OUTPATIENT
Start: 2023-11-27 | End: 2023-12-05 | Stop reason: SDUPTHER

## 2023-11-27 RX ORDER — OXYCODONE AND ACETAMINOPHEN 10; 325 MG/1; MG/1
1 TABLET ORAL EVERY 4 HOURS PRN
Qty: 20 TABLET | Refills: 0 | Status: SHIPPED | OUTPATIENT
Start: 2023-11-27 | End: 2023-12-05 | Stop reason: SDUPTHER

## 2023-11-27 RX ORDER — AMLODIPINE BESYLATE 2.5 MG/1
2.5 TABLET ORAL DAILY
Qty: 30 TABLET | Refills: 2 | Status: SHIPPED | OUTPATIENT
Start: 2023-11-27 | End: 2024-02-25

## 2023-11-27 RX ORDER — HYDROXYZINE PAMOATE 25 MG/1
25 CAPSULE ORAL EVERY 4 HOURS PRN
Qty: 30 CAPSULE | Refills: 0 | Status: SHIPPED | OUTPATIENT
Start: 2023-11-27 | End: 2023-12-07

## 2023-11-27 RX ORDER — POLYETHYLENE GLYCOL 3350 17 G/17G
17 POWDER, FOR SOLUTION ORAL 2 TIMES DAILY PRN
Qty: 14 PACKET | Refills: 0 | Status: SHIPPED | OUTPATIENT
Start: 2023-11-27 | End: 2023-12-07

## 2023-11-27 RX ADMIN — ATORVASTATIN CALCIUM 80 MG: 40 TABLET, FILM COATED ORAL at 08:11

## 2023-11-27 RX ADMIN — LIOTHYRONINE SODIUM 5 MCG: 5 TABLET ORAL at 05:11

## 2023-11-27 RX ADMIN — APIXABAN 5 MG: 5 TABLET, FILM COATED ORAL at 08:11

## 2023-11-27 RX ADMIN — LEVOTHYROXINE SODIUM 100 MCG: 100 TABLET ORAL at 08:11

## 2023-11-27 RX ADMIN — AMLODIPINE BESYLATE 2.5 MG: 2.5 TABLET ORAL at 08:11

## 2023-11-27 RX ADMIN — METHOCARBAMOL 750 MG: 750 TABLET ORAL at 08:11

## 2023-11-27 NOTE — PLAN OF CARE
11/27/23 0850   Final Note   Assessment Type Final Discharge Note   Anticipated Discharge Disposition Home   Post-Acute Status   Discharge Delays None known at this time     Patient will dc home today. Order to dc home with home health. CM called and spoke with patient, she is declining the need for home health at this time. Family will provide transportation.

## 2023-11-27 NOTE — NURSING
Patient to discharge home with Prevena per MD order. Follow up appointments have been made and patient understands the importance of attending and when to call MD with any questions or concerns. Eliquis Coupon was given to patient per MD. Iv was removed without compilations. All prescriptions were sen to patient preferred pharmacy. No further questions were asked. Patient stable upon discharge.

## 2023-11-27 NOTE — DISCHARGE SUMMARY
[facility] Hospital Medicine  Discharge Summary      Patient Name: Briseyda Torres  MRN: [id number]  [facility]: [id number]  Patient Class: IP- Inpatient  Admission Date: 11/22/2023  Hospital Length of Stay: 5 days  Discharge Date and Time:  11/27/2023 8:17 AM  Attending Physician: James Frances MD   Discharging Provider: James Frances MD  Primary Care Provider: Deo Jacobs MD    Primary Care Team: Networked reference to record PCT           Ms. Torres is a 63 year old female with a pmh of HTN, HLD, lupus, CVA, migraines, fibromyalgia, anxiety/depression, LLE DVT 10/2023 on Xarelto. She presented to [facility] ED with complaints of wound dehiscence to her left knee.  She states that she had an ORIF performed last month for a patella fracture. She states that she has been changing the bandages as directed and has noticed that over time, they have been becoming more and more bloody.  She called Dr. Corona's office and they suggested she present to [facility] ED. She denied any fever, chills, or drainage.     Hypertensive at presentation but otherwise she was HDS. ED lab work revealed H&H 10.8/34.3, sed rate 32, PT 22.4, APTT 69.9, CRP 8.76. Orthopedics was consulted. She was started on Ancef 1 g q.8 hours, transferred to [facility] and admitted to Hospital Medicine for management of wound dehiscence.       Orthopedics recommended washout and holding of Xarelto.  Underwent incision drainage and closure, evacuation of hematoma on 11/23/2023.  Orthopedics has cleared to resume anticoagulation. Eliquis was added and Hb remianed stable. She will be discharged to home today with outpatient PT and ortho follow up        Surgical wound dehiscence, surgical site hematoma s/p washout 11/23/23  history of left lower extremity DVT oct/2023   HX: HTN, HLD, chronic lupus, history of CVA, migraine, fibromyalgia, anxiety, depression        Procedure(s) (LRB):  INCISION AND DRAINAGE, LOWER EXTREMITY (Left)         Goals of Care Treatment Preferences:  Code Status: Full Code    Vitals:    11/27/23 0722   BP: (!) 158/81   Pulse: 66   Resp: 18   Temp: 98.5 °F (36.9 °C)     General: In no acute distress, afebrile  Respiratory: Clear to auscultation bilaterally  Cardiovascular: S1, S2, no appreciable murmur  Abdomen: Soft, nontender, BS +  Extremities left knee surgical site dressed  Neurologic: Alert and oriented x4, moving all extremities with good strength     Consults:   Consults (From admission, onward)          Status Ordering Provider     Inpatient consult to Orthopedic Surgery  Once        Provider:  Curtis Corona MD    Acknowledged SHIVANI KASPER            No new Assessment & Plan notes have been filed under this hospital service since the last note was generated.  Service: Hospital Medicine    Final Active Diagnoses:    Diagnosis Date Noted POA    PRINCIPAL PROBLEM:  Surgical wound dehiscence, initial encounter [T81.31XA] 11/23/2023 Yes      Problems Resolved During this Admission:    Diagnosis Date Noted Date Resolved POA    Hematoma of left knee region [S80.02XA] 11/23/2023 11/27/2023 Yes       Discharged Condition: good    Disposition: Home      Follow Up:   Follow-up Information       Curtis Corona MD Follow up on 11/30/2023.    Specialty: Orthopedic Surgery  Contact information:  [address] [place] [id number] [phone]               Deo Jacobs MD Follow up in 2 week(s).    Specialty: Family Medicine  Contact information:  [address] [place] [address]  [address] [place] [address] [phone]                           Patient Instructions:   No discharge procedures on file.    Significant Diagnostic Studies: Labs: CMP   Recent Labs   Lab 11/26/23  0624 11/27/23  0420    140   K 3.9 3.9   CO2 27 27   BUN 7.1* 9.1*   CREATININE 0.76 0.74   CALCIUM 8.8 9.0   ALBUMIN 3.2* 3.4   BILITOT 0.4 0.3   ALKPHOS 88 94   AST 17 21   ALT 7 7       Pending Diagnostic  Studies:       None           Medications:  Reconciled Home Medications:      Medication List        START taking these medications      amLODIPine 2.5 MG tablet  Commonly known as: NORVASC  Take 1 tablet (2.5 mg total) by mouth once daily.     apixaban 5 mg Tab  Commonly known as: ELIQUIS  Take 1 tablet (5 mg total) by mouth 2 (two) times daily.     hydrOXYzine pamoate 25 MG Cap  Commonly known as: VISTARIL  Take 1 capsule (25 mg total) by mouth every 4 (four) hours as needed (itching).     polyethylene glycol 17 gram Pwpk  Commonly known as: GLYCOLAX  Take 17 g by mouth 2 (two) times daily as needed for Constipation.            CHANGE how you take these medications      methocarbamoL 750 MG Tab  Commonly known as: ROBAXIN  Take 1 tablet (750 mg total) by mouth 3 (three) times daily. for 10 days  What changed:   how much to take  how to take this  when to take this  additional instructions     oxyCODONE-acetaminophen  mg per tablet  Commonly known as: PERCOCET  Take 1 tablet by mouth every 4 (four) hours as needed for Pain.  What changed: when to take this            CONTINUE taking these medications      ALPRAZolam 1 MG tablet  Commonly known as: XANAX  Take 1 mg by mouth every evening.     EPINEPHrine 0.3 mg/0.3 mL Atin  Commonly known as: EPIPEN  ADMINISTER 1 PEN INJECTOR IN THE MUSCLE AS NEEDED FOR ANAPHYLAXIS     EScitalopram oxalate 10 MG tablet  Commonly known as: LEXAPRO  Take 20 mg by mouth every evening.     levothyroxine 100 MCG tablet  Commonly known as: SYNTHROID  Take 100 mcg by mouth once daily.     liothyronine 5 MCG Tab  Commonly known as: CYTOMEL  Take 5 mcg by mouth every morning.     losartan 25 MG tablet  Commonly known as: COZAAR  Take 25 mg by mouth every evening.     promethazine 25 MG tablet  Commonly known as: PHENERGAN  Take 25 mg by mouth every 6 (six) hours as needed for Nausea.     rosuvastatin 20 MG tablet  Commonly known as: CRESTOR  Take 20 mg by mouth once daily.      sumatriptan 25 MG Tab  Commonly known as: IMITREX  Take 1 tablet by mouth daily as needed for Migraine.     WOMEN'S 50 PLUS MULTIVIT-IRON ORAL  Take 1 tablet by mouth once daily.            STOP taking these medications      pseudoephedrine 120 mg 12 hr tablet  Commonly known as: SUDAFED     rivaroxaban 15 mg (42)- 20 mg (9) tablet dose pack  Commonly known as: XARELTO              Indwelling Lines/Drains at time of discharge:   Lines/Drains/Airways       None                   Time spent on the discharge of patient: 35 minutes         James Frances MD  Department of Hospital Medicine  [facility]

## 2023-11-30 ENCOUNTER — PATIENT MESSAGE (OUTPATIENT)
Dept: ADMINISTRATIVE | Facility: OTHER | Age: 63
End: 2023-11-30
Payer: MEDICARE

## 2023-11-30 ENCOUNTER — OFFICE VISIT (OUTPATIENT)
Dept: ORTHOPEDICS | Facility: CLINIC | Age: 63
End: 2023-11-30
Payer: MEDICARE

## 2023-11-30 VITALS
HEIGHT: 66 IN | WEIGHT: 203 LBS | SYSTOLIC BLOOD PRESSURE: 125 MMHG | DIASTOLIC BLOOD PRESSURE: 69 MMHG | HEART RATE: 89 BPM | BODY MASS INDEX: 32.62 KG/M2

## 2023-11-30 DIAGNOSIS — T81.31XA SURGICAL WOUND DEHISCENCE, INITIAL ENCOUNTER: Primary | ICD-10-CM

## 2023-11-30 DIAGNOSIS — S82.042D CLOSED DISPLACED COMMINUTED FRACTURE OF LEFT PATELLA WITH ROUTINE HEALING, SUBSEQUENT ENCOUNTER: ICD-10-CM

## 2023-11-30 PROCEDURE — 99024 PR POST-OP FOLLOW-UP VISIT: ICD-10-PCS | Mod: POP,,, | Performed by: ORTHOPAEDIC SURGERY

## 2023-11-30 PROCEDURE — 99024 POSTOP FOLLOW-UP VISIT: CPT | Mod: POP,,, | Performed by: ORTHOPAEDIC SURGERY

## 2023-11-30 NOTE — PROGRESS NOTES
Subjective:       Patient ID: Briseyda Torres is a 63 y.o. female.    Chief Complaint   Patient presents with    Left Knee - Post-op Evaluation     1 week f/u from I&D left knee. Present with wound vac in place. Reports increase in pain worse with weather.         Patient is here today for follow-up evaluation now 1 week status post repair of surgical wound dehiscence left knee following patellar repair.  She is been doing very well since discharge from the hospital.  She is now on Eliquis for her DVT, switched from Xarelto.  She was reports no bleeding episodes.  Her wound VAC has been intact for 1 week.  She has had her physical therapy on hold.  She reports that her pain is well-controlled.  No fevers or chills, no signs of infection.        Review of Systems   Constitutional: Negative for chills, fever and malaise/fatigue.   HENT:  Negative for congestion and hearing loss.    Eyes:  Negative for visual disturbance.   Cardiovascular:  Negative for chest pain and syncope.   Respiratory:  Negative for cough and shortness of breath.    Hematologic/Lymphatic: Does not bruise/bleed easily.   Skin:  Negative for color change and suspicious lesions.   Musculoskeletal:  Negative for falls and neck pain.   Gastrointestinal:  Negative for abdominal pain, nausea and vomiting.   Genitourinary:  Negative for dysuria and hematuria.   Neurological:  Negative for numbness and sensory change.   Psychiatric/Behavioral:  Negative for altered mental status. The patient is not nervous/anxious.         Current Outpatient Medications on File Prior to Visit   Medication Sig Dispense Refill    ALPRAZolam (XANAX) 1 MG tablet Take 1 mg by mouth every evening.      apixaban (ELIQUIS) 5 mg Tab Take 1 tablet (5 mg total) by mouth 2 (two) times daily. 60 tablet 1    EPINEPHrine (EPIPEN) 0.3 mg/0.3 mL AtIn ADMINISTER 1 PEN INJECTOR IN THE MUSCLE AS NEEDED FOR ANAPHYLAXIS      EScitalopram oxalate (LEXAPRO) 10 MG tablet Take 20 mg by mouth  "every evening.      hydrOXYzine pamoate (VISTARIL) 25 MG Cap Take 1 capsule (25 mg total) by mouth every 4 (four) hours as needed (itching). 30 capsule 0    levothyroxine (SYNTHROID) 100 MCG tablet Take 100 mcg by mouth once daily.      liothyronine (CYTOMEL) 5 MCG Tab Take 5 mcg by mouth every morning.      losartan (COZAAR) 25 MG tablet Take 25 mg by mouth every evening.      methocarbamoL (ROBAXIN) 750 MG Tab Take 1 tablet (750 mg total) by mouth 3 (three) times daily. for 10 days 20 tablet 0    mv-min/iron/folic/calcium/vitK (WOMEN'S 50 PLUS MULTIVIT-IRON ORAL) Take 1 tablet by mouth once daily.      oxyCODONE-acetaminophen (PERCOCET)  mg per tablet Take 1 tablet by mouth every 4 (four) hours as needed for Pain. 20 tablet 0    polyethylene glycol (GLYCOLAX) 17 gram PwPk Take 17 g by mouth 2 (two) times daily as needed for Constipation. 14 packet 0    promethazine (PHENERGAN) 25 MG tablet Take 25 mg by mouth every 6 (six) hours as needed for Nausea.      rosuvastatin (CRESTOR) 20 MG tablet Take 20 mg by mouth once daily.      sumatriptan (IMITREX) 25 MG Tab Take 1 tablet by mouth daily as needed for Migraine.      amLODIPine (NORVASC) 2.5 MG tablet Take 1 tablet (2.5 mg total) by mouth once daily. 30 tablet 2     No current facility-administered medications on file prior to visit.          Objective:      /69   Pulse 89   Ht 5' 6" (1.676 m)   Wt 92.1 kg (203 lb)   BMI 32.77 kg/m²   Physical Exam  Constitutional:       General: She is not in acute distress.     Appearance: Normal appearance. She is not ill-appearing.   HENT:      Head: Normocephalic and atraumatic.      Nose: No congestion.   Eyes:      Extraocular Movements: Extraocular movements intact.   Cardiovascular:      Rate and Rhythm: Normal rate and regular rhythm.      Pulses: Normal pulses.   Pulmonary:      Effort: Pulmonary effort is normal.      Breath sounds: Normal breath sounds.   Abdominal:      General: There is no distension. "      Palpations: Abdomen is soft.      Tenderness: There is no abdominal tenderness.   Musculoskeletal:      Comments: Left lower extremity:  Surgical incision is intact, no bleeding noted no dehiscence.  Minimal swelling around the knee as expected.  No fluctuance or purulence expressed, no surrounding erythema.  No calf swelling or tenderness no signs of worsening of he.  She has palpable DP pulse.  Good range motion of her ankle and digits.  No attempted range motion of the knee is performed today.   Skin:     General: Skin is warm and dry.   Neurological:      Mental Status: She is alert and oriented to person, place, and time. Mental status is at baseline.   Psychiatric:         Mood and Affect: Mood normal.         Behavior: Behavior normal.         Thought Content: Thought content normal.         Judgment: Judgment normal.        Body mass index is 32.77 kg/m².    Radiology:         Assessment:         1. Surgical wound dehiscence, initial encounter        2. Closed displaced comminuted fracture of left patella with routine healing, subsequent encounter                Plan:       She is doing very well today number happy with her progress.  She can resume PT as ordered prior to her recent hospitalization.  She would no signs or symptoms of infection.  She will continue daily dry dressing changes.  She will return to see me in 2 weeks for removal of her sutures and staples.  She and her  are happy with this plan of care today and all questions and concerns were addressed.    This note/OR report was created with the assistance of  voice recognition software or phone  dictation.  There may be transcription errors as a result of using this technology however minimal. Effort has been made to assure accuracy of transcription but any obvious errors or omissions should be clarified with the author of the document.       Curtis Corona MD  Orthopedic Trauma  [facility]      Follow up in about  2 weeks (around 12/14/2023).    Surgical wound dehiscence, initial encounter    Closed displaced comminuted fracture of left patella with routine healing, subsequent encounter              No orders of the defined types were placed in this encounter.      Future Appointments   Date Time Provider Department Center   12/14/2023  1:00 PM Curtis Corona MD [facility] [place]

## 2023-11-30 NOTE — PHYSICIAN QUERY
PT Name: Briseyda Torres  MR #: [id number]     DOCUMENTATION CLARIFICATION     CDS/: JOHANA Mays, RN               Contact information: [email or web address]  This form is a permanent document in the medical record.     Query Date: November 30, 2023    By submitting this query, we are merely seeking further clarification of documentation.  Please utilize your independent clinical judgment when addressing the question(s) below.  The Medical Record contains the following   Indicators   Supporting Clinical Findings Location in Medical Record    x Surgery or Procedure Performed  surgery on a left knee patella fracture, ORIF 1 month ago     Date of Surgery 11/23/2023  PROCEDURE:    1. Direct primary closure of left knee surgical wound dehiscence.  2. Evacuation of hematoma, left knee.   Orthopedic Surgery Op Note 11/23/2023    x Estimated Blood Loss (EBL)  50 mL Orthopedic Surgery Op Note 11/23/2023      x Bleeding, hemorrhage, hematoma, seroma documented LLE DVT who presented to [facility] ED with complaints of wound dehiscence to her left knee.  She states that she had an ORIF performed last month for a patella fracture. She states that she has been changing the bandages as directed and has noticed that over time, they have been becoming more and more bloody.  Today, when she went to change her bandage, she noticed that her sugical site incision had opened up.     DVT of the left lower extremity.    Approximately 1 month ago, she had an open reduction and internal fixation for   comminuted fracture of her left patella.  She did well following surgery.    Unfortunately, due to her anticoagulation, she has had continued bleeding from   her surgical site with wound dehiscence and hematoma formation.     left knee surgical wound dehiscence; left knee hematoma     She has had some bleeding complication including bloody nose and gums due to her oral anticoagulation for her DVT.  She has been unable to follow up with her  cardiologist to discuss   management of her oral anticoagulation.    Hospital Medicine H&P 11/22/2023                      Orthopedic Surgery Consults Note 11/23/2023                  Orthopedic Surgery Op Note 11/23/2023    x Evacuation of hematoma documented Evacuation of hematoma, left knee.     She did have an abundance of hematoma that was buildup beneath her incision over her patella fixation.  This was evacuated thoroughly.     All hematoma was evacuated. She had no active   bleeding.     Orthopedic Surgery Op Note 11/23/2023      x Anticoagulant/Antiplatelet/Blood thinner administration She is on xarelto for DVT.     AC on hold, resume from Sunday. Need ac atleast for another month.     AC on hold.  Add Eliquis from tomorrow and monitor response     Patient we will start Eliquis tomorrow for her DVT coverage.  Orthopedics will follow     Resume anticoagulation, switch to Eliquis today. Monitor for bleeding     Apixaban 5 mg Tab  Commonly Known as Eliquis  Take 1 tablet (5 mg total) by mouth 2 (two) times daily ED Provider Notes 11/22/2023    Hospital Medicine Progress Notes 11/24/2023    Hospital Medicine Progress Notes 11/25/2023    Orthopedic Surgery Progress Notes 11/25/2023    Hospital Medicine Progress Notes 11/26/2023    Hospital Medicine Discharge Summary 11/27/2023    Transfusion      x Treatment                Hold Xarelto for now     She will need evaluation for management and changing of her oral anticoagulation.    She did have an abundance of hematoma that was buildup beneath her incision over her patella fixation.  This was evacuated thoroughly.     All hematoma was evacuated. She had no active   bleeding.      We will hold off on anticoagulation at   this time until evaluation by the primary team to discuss a change in management  due to her bleeding complications with the Xarelto. She is currently taking 30  mg a day for left lower extremity DVT.   Hospital Medicine H&P 11/22/2023       Orthopedic Surgery Op Note 11/23/2023      x Other Presents with Wound Dehiscence  Presenting with post operative problem. She had ORIF of L patella fracture on 10/27 by Dr. Corona. She had been healing well but had been having some complications related to persistent oozing from wound. She is on xarelto for DVT. She has frequent bandage changes. The wound had remained intact and some of the center staples had been removed and steri strips placed. However today when she went to change her bandage which was bloody, she noticed the wound was open.  ED Provider Notes 11/22/2023       The noted clinical guidelines are only system guidelines and do not replace the providers clinical judgment.    Provider, please clarify the diagnosis: Left knee hematoma    [ X  ] Hematoma - a blood collection usually related to excessive bleeding at the site, either from the original process or due to excessive bleeding--a traumatic hematoma is caused by tissue damage from the injury     [    ] Post-procedural/Postoperative hematoma - an unexpected and reportable surgical or procedural complication that occurs after the procedure     [ X ] Other (please specify): _Patient on oral anticoagulation for DVT treatment_____________           Please document in your progress notes daily for the duration of treatment until resolved and include in your discharge summary.     Reference:  Documentation and Coding of Complications (Presentation). (2016). Retrieved October 22, 2020, from [email or web address]  Form No. 10516

## 2023-12-05 DIAGNOSIS — T81.31XA SURGICAL WOUND DEHISCENCE, INITIAL ENCOUNTER: Primary | ICD-10-CM

## 2023-12-05 DIAGNOSIS — S82.042D CLOSED DISPLACED COMMINUTED FRACTURE OF LEFT PATELLA WITH ROUTINE HEALING, SUBSEQUENT ENCOUNTER: ICD-10-CM

## 2023-12-05 RX ORDER — METHOCARBAMOL 750 MG/1
750 TABLET, FILM COATED ORAL 3 TIMES DAILY
Qty: 42 TABLET | Refills: 0 | Status: SHIPPED | OUTPATIENT
Start: 2023-12-05 | End: 2023-12-19

## 2023-12-05 RX ORDER — OXYCODONE AND ACETAMINOPHEN 10; 325 MG/1; MG/1
1 TABLET ORAL EVERY 6 HOURS PRN
Qty: 28 TABLET | Refills: 0 | Status: SHIPPED | OUTPATIENT
Start: 2023-12-05 | End: 2023-12-14 | Stop reason: SDUPTHER

## 2023-12-14 ENCOUNTER — OFFICE VISIT (OUTPATIENT)
Dept: ORTHOPEDICS | Facility: CLINIC | Age: 63
End: 2023-12-14
Payer: MEDICARE

## 2023-12-14 ENCOUNTER — HOSPITAL ENCOUNTER (OUTPATIENT)
Dept: RADIOLOGY | Facility: CLINIC | Age: 63
Discharge: HOME | End: 2023-12-14
Attending: ORTHOPAEDIC SURGERY
Payer: MEDICARE

## 2023-12-14 VITALS
WEIGHT: 203 LBS | BODY MASS INDEX: 32.62 KG/M2 | SYSTOLIC BLOOD PRESSURE: 149 MMHG | HEIGHT: 66 IN | DIASTOLIC BLOOD PRESSURE: 91 MMHG | HEART RATE: 69 BPM

## 2023-12-14 DIAGNOSIS — S82.042D CLOSED DISPLACED COMMINUTED FRACTURE OF LEFT PATELLA WITH ROUTINE HEALING, SUBSEQUENT ENCOUNTER: ICD-10-CM

## 2023-12-14 DIAGNOSIS — T81.31XA SURGICAL WOUND DEHISCENCE, INITIAL ENCOUNTER: ICD-10-CM

## 2023-12-14 DIAGNOSIS — T81.31XA SURGICAL WOUND DEHISCENCE, INITIAL ENCOUNTER: Primary | ICD-10-CM

## 2023-12-14 PROCEDURE — 99024 POSTOP FOLLOW-UP VISIT: CPT | Mod: POP,,, | Performed by: ORTHOPAEDIC SURGERY

## 2023-12-14 PROCEDURE — 73560 XR KNEE 1 OR 2 VIEW LEFT: ICD-10-PCS | Mod: LT,,, | Performed by: ORTHOPAEDIC SURGERY

## 2023-12-14 PROCEDURE — 73560 X-RAY EXAM OF KNEE 1 OR 2: CPT | Mod: LT,,, | Performed by: ORTHOPAEDIC SURGERY

## 2023-12-14 PROCEDURE — 99024 PR POST-OP FOLLOW-UP VISIT: ICD-10-PCS | Mod: POP,,, | Performed by: ORTHOPAEDIC SURGERY

## 2023-12-14 RX ORDER — OXYCODONE AND ACETAMINOPHEN 10; 325 MG/1; MG/1
1 TABLET ORAL EVERY 6 HOURS PRN
Qty: 28 TABLET | Refills: 0 | Status: SHIPPED | OUTPATIENT
Start: 2023-12-14 | End: 2023-12-21 | Stop reason: SDUPTHER

## 2023-12-14 NOTE — PROGRESS NOTES
Subjective:       Patient ID: Briseyda Torres is a 63 y.o. female.    Chief Complaint   Patient presents with    Left Knee - Follow-up     7 week f/u from ORIF left patella. Reports fall yesterday with increase in aching and discomfort. Currently in physical therapy. Present in hinge knee brace.         Patient is here today for follow-up evaluation 7 weeks status post open reduction internal fixation of right patella, 3 weeks status post irrigation debridement with secondary closure of surgical wound dehiscence.  She is doing very well today.  She states that her incision has remained clean and dry, she has had no further bleeding episodes on the Eliquis like she did with the Xarelto.  Minimal swelling at the knee.  She did have a recent fall at home however she did not land on her knee.  She has been working with physical therapy she is able to ambulate well with the brace in place.    Follow-up  Pertinent negatives include no abdominal pain, chest pain, chills, congestion, coughing, fever, nausea, neck pain, numbness or vomiting.       Review of Systems   Constitutional: Negative for chills, fever and malaise/fatigue.   HENT:  Negative for congestion and hearing loss.    Eyes:  Negative for visual disturbance.   Cardiovascular:  Negative for chest pain and syncope.   Respiratory:  Negative for cough and shortness of breath.    Hematologic/Lymphatic: Does not bruise/bleed easily.   Skin:  Negative for color change and suspicious lesions.   Musculoskeletal:  Negative for falls and neck pain.   Gastrointestinal:  Negative for abdominal pain, nausea and vomiting.   Genitourinary:  Negative for dysuria and hematuria.   Neurological:  Negative for numbness and sensory change.   Psychiatric/Behavioral:  Negative for altered mental status. The patient is not nervous/anxious.         Current Outpatient Medications on File Prior to Visit   Medication Sig Dispense Refill    ALPRAZolam (XANAX) 1 MG tablet Take 1 mg by  "mouth every evening.      apixaban (ELIQUIS) 5 mg Tab Take 1 tablet (5 mg total) by mouth 2 (two) times daily. 60 tablet 1    EPINEPHrine (EPIPEN) 0.3 mg/0.3 mL AtIn ADMINISTER 1 PEN INJECTOR IN THE MUSCLE AS NEEDED FOR ANAPHYLAXIS      EScitalopram oxalate (LEXAPRO) 10 MG tablet Take 20 mg by mouth every evening.      levothyroxine (SYNTHROID) 100 MCG tablet Take 100 mcg by mouth once daily.      liothyronine (CYTOMEL) 5 MCG Tab Take 5 mcg by mouth every morning.      losartan (COZAAR) 25 MG tablet Take 25 mg by mouth every evening.      methocarbamoL (ROBAXIN) 750 MG Tab Take 1 tablet (750 mg total) by mouth 3 (three) times daily. for 14 days 42 tablet 0    mv-min/iron/folic/calcium/vitK (WOMEN'S 50 PLUS MULTIVIT-IRON ORAL) Take 1 tablet by mouth once daily.      promethazine (PHENERGAN) 25 MG tablet Take 25 mg by mouth every 6 (six) hours as needed for Nausea.      rosuvastatin (CRESTOR) 20 MG tablet Take 20 mg by mouth once daily.      sumatriptan (IMITREX) 25 MG Tab Take 1 tablet by mouth daily as needed for Migraine.      amLODIPine (NORVASC) 2.5 MG tablet Take 1 tablet (2.5 mg total) by mouth once daily. 30 tablet 2    [DISCONTINUED] oxyCODONE-acetaminophen (PERCOCET)  mg per tablet Take 1 tablet by mouth every 6 (six) hours as needed for Pain. 28 tablet 0     No current facility-administered medications on file prior to visit.          Objective:      BP (!) 149/91   Pulse 69   Ht 5' 6" (1.676 m)   Wt 92.1 kg (203 lb)   BMI 32.77 kg/m²   Physical Exam  Constitutional:       General: She is not in acute distress.     Appearance: Normal appearance. She is not ill-appearing.   HENT:      Head: Normocephalic and atraumatic.      Nose: No congestion.   Eyes:      Extraocular Movements: Extraocular movements intact.   Cardiovascular:      Rate and Rhythm: Normal rate and regular rhythm.      Pulses: Normal pulses.   Pulmonary:      Effort: Pulmonary effort is normal.      Breath sounds: Normal breath " sounds.   Abdominal:      General: There is no distension.      Palpations: Abdomen is soft.      Tenderness: There is no abdominal tenderness.   Musculoskeletal:      Comments: Left lower extremity:  Surgical incision is well healed, staples and sutures removed today.  Steri-Strips applied.  No effusion noted.  No erythema or drainage.  Her hardware is palpable though not painful or prominent.  She tolerates passive range of motion of the left knee from 0-60 degrees without significant discomfort.  She is got 5/5 motor strength distally in the ankle and digits.  She is got good tone in her quads with active extension.   Skin:     General: Skin is warm and dry.   Neurological:      Mental Status: She is alert and oriented to person, place, and time. Mental status is at baseline.   Psychiatric:         Mood and Affect: Mood normal.         Behavior: Behavior normal.         Thought Content: Thought content normal.         Judgment: Judgment normal.        Body mass index is 32.77 kg/m².    Radiology:   Left knee two views:  Hardware intact.  Alignment maintained.  Fracture appears to be consolidating well.      Assessment:         1. Surgical wound dehiscence, initial encounter  X-Ray Knee 1 or 2 View Left      2. Closed displaced comminuted fracture of left patella with routine healing, subsequent encounter  X-Ray Knee 1 or 2 View Left              Plan:       She is doing well today and I am happy with her progress.  Her incision is completely healed in her sutures and staples are removed.  She can continue to work with physical therapy on range motion and strengthening to the knee with a progressive protocol that was provided.  Continue to ambulate with the knee out in full extension.  I will see her back in 6 weeks at which time she should be out of the brace and performing range of motion past 90°.  She is happy with this plan of care and all questions and concerns were addressed.      This note/OR report was  created with the assistance of  voice recognition software or phone  dictation.  There may be transcription errors as a result of using this technology however minimal. Effort has been made to assure accuracy of transcription but any obvious errors or omissions should be clarified with the author of the document.       Curtis Corona MD  Orthopedic Trauma  [facility]      Follow up in about 6 weeks (around 1/25/2024).    Surgical wound dehiscence, initial encounter  -     X-Ray Knee 1 or 2 View Left; Future; Expected date: 12/14/2023    Closed displaced comminuted fracture of left patella with routine healing, subsequent encounter  -     X-Ray Knee 1 or 2 View Left; Future; Expected date: 12/14/2023              Orders Placed This Encounter   Procedures    X-Ray Knee 1 or 2 View Left     Standing Status:   Future     Number of Occurrences:   1     Standing Expiration Date:   12/13/2024     Order Specific Question:   May the Radiologist modify the order per protocol to meet the clinical needs of the patient?     Answer:   Yes     Order Specific Question:   Release to patient     Answer:   Immediate       Future Appointments   Date Time Provider Department Center   1/25/2024 10:30 AM Curtis Corona MD [facility]

## 2023-12-21 ENCOUNTER — PATIENT MESSAGE (OUTPATIENT)
Dept: ORTHOPEDICS | Facility: CLINIC | Age: 63
End: 2023-12-21
Payer: MEDICARE

## 2023-12-21 DIAGNOSIS — T81.31XA SURGICAL WOUND DEHISCENCE, INITIAL ENCOUNTER: ICD-10-CM

## 2023-12-21 DIAGNOSIS — S82.042D CLOSED DISPLACED COMMINUTED FRACTURE OF LEFT PATELLA WITH ROUTINE HEALING, SUBSEQUENT ENCOUNTER: ICD-10-CM

## 2023-12-21 RX ORDER — OXYCODONE AND ACETAMINOPHEN 10; 325 MG/1; MG/1
1 TABLET ORAL EVERY 6 HOURS PRN
Qty: 28 TABLET | Refills: 0 | Status: SHIPPED | OUTPATIENT
Start: 2023-12-21 | End: 2023-12-28 | Stop reason: SDUPTHER

## 2023-12-28 DIAGNOSIS — S82.042D CLOSED DISPLACED COMMINUTED FRACTURE OF LEFT PATELLA WITH ROUTINE HEALING, SUBSEQUENT ENCOUNTER: ICD-10-CM

## 2023-12-28 DIAGNOSIS — T81.31XA SURGICAL WOUND DEHISCENCE, INITIAL ENCOUNTER: ICD-10-CM

## 2023-12-28 RX ORDER — OXYCODONE AND ACETAMINOPHEN 10; 325 MG/1; MG/1
1 TABLET ORAL EVERY 8 HOURS PRN
Qty: 21 TABLET | Refills: 0 | Status: SHIPPED | OUTPATIENT
Start: 2023-12-28 | End: 2024-01-04 | Stop reason: SDUPTHER

## 2023-12-28 RX ORDER — OXYCODONE AND ACETAMINOPHEN 10; 325 MG/1; MG/1
1 TABLET ORAL EVERY 8 HOURS PRN
Qty: 21 TABLET | Refills: 0 | OUTPATIENT
Start: 2023-12-28 | End: 2024-01-04

## 2024-01-04 ENCOUNTER — PATIENT MESSAGE (OUTPATIENT)
Dept: ORTHOPEDICS | Facility: CLINIC | Age: 64
End: 2024-01-04
Payer: MEDICARE

## 2024-01-04 DIAGNOSIS — T81.31XA SURGICAL WOUND DEHISCENCE, INITIAL ENCOUNTER: ICD-10-CM

## 2024-01-04 DIAGNOSIS — S82.042D CLOSED DISPLACED COMMINUTED FRACTURE OF LEFT PATELLA WITH ROUTINE HEALING, SUBSEQUENT ENCOUNTER: Primary | ICD-10-CM

## 2024-01-04 DIAGNOSIS — S82.042D CLOSED DISPLACED COMMINUTED FRACTURE OF LEFT PATELLA WITH ROUTINE HEALING, SUBSEQUENT ENCOUNTER: ICD-10-CM

## 2024-01-05 RX ORDER — OXYCODONE AND ACETAMINOPHEN 10; 325 MG/1; MG/1
1 TABLET ORAL EVERY 8 HOURS PRN
Qty: 21 TABLET | Refills: 0 | Status: SHIPPED | OUTPATIENT
Start: 2024-01-05 | End: 2024-01-12 | Stop reason: SDUPTHER

## 2024-01-12 DIAGNOSIS — T81.31XA SURGICAL WOUND DEHISCENCE, INITIAL ENCOUNTER: ICD-10-CM

## 2024-01-12 DIAGNOSIS — S82.042D CLOSED DISPLACED COMMINUTED FRACTURE OF LEFT PATELLA WITH ROUTINE HEALING, SUBSEQUENT ENCOUNTER: ICD-10-CM

## 2024-01-16 RX ORDER — OXYCODONE AND ACETAMINOPHEN 10; 325 MG/1; MG/1
1 TABLET ORAL EVERY 8 HOURS PRN
Qty: 21 TABLET | Refills: 0 | Status: SHIPPED | OUTPATIENT
Start: 2024-01-16 | End: 2024-01-25 | Stop reason: SDUPTHER

## 2024-01-24 ENCOUNTER — PATIENT MESSAGE (OUTPATIENT)
Dept: ORTHOPEDICS | Facility: CLINIC | Age: 64
End: 2024-01-24
Payer: MEDICARE

## 2024-01-24 DIAGNOSIS — S82.042D CLOSED DISPLACED COMMINUTED FRACTURE OF LEFT PATELLA WITH ROUTINE HEALING, SUBSEQUENT ENCOUNTER: Primary | ICD-10-CM

## 2024-01-25 DIAGNOSIS — T81.31XA SURGICAL WOUND DEHISCENCE, INITIAL ENCOUNTER: ICD-10-CM

## 2024-01-25 DIAGNOSIS — S82.042D CLOSED DISPLACED COMMINUTED FRACTURE OF LEFT PATELLA WITH ROUTINE HEALING, SUBSEQUENT ENCOUNTER: ICD-10-CM

## 2024-01-25 RX ORDER — OXYCODONE AND ACETAMINOPHEN 10; 325 MG/1; MG/1
1 TABLET ORAL EVERY 12 HOURS PRN
Qty: 14 TABLET | Refills: 0 | Status: SHIPPED | OUTPATIENT
Start: 2024-01-25 | End: 2024-02-01

## 2024-01-31 ENCOUNTER — OFFICE VISIT (OUTPATIENT)
Dept: ORTHOPEDICS | Facility: CLINIC | Age: 64
End: 2024-01-31
Payer: MEDICARE

## 2024-01-31 ENCOUNTER — HOSPITAL ENCOUNTER (OUTPATIENT)
Dept: RADIOLOGY | Facility: CLINIC | Age: 64
Discharge: HOME | End: 2024-01-31
Attending: ORTHOPAEDIC SURGERY
Payer: MEDICARE

## 2024-01-31 VITALS
BODY MASS INDEX: 32.62 KG/M2 | WEIGHT: 203 LBS | DIASTOLIC BLOOD PRESSURE: 91 MMHG | HEIGHT: 66 IN | SYSTOLIC BLOOD PRESSURE: 153 MMHG | HEART RATE: 64 BPM

## 2024-01-31 DIAGNOSIS — S82.042D CLOSED DISPLACED COMMINUTED FRACTURE OF LEFT PATELLA WITH ROUTINE HEALING, SUBSEQUENT ENCOUNTER: Primary | ICD-10-CM

## 2024-01-31 DIAGNOSIS — S82.042D CLOSED DISPLACED COMMINUTED FRACTURE OF LEFT PATELLA WITH ROUTINE HEALING, SUBSEQUENT ENCOUNTER: ICD-10-CM

## 2024-01-31 PROCEDURE — 99024 POSTOP FOLLOW-UP VISIT: CPT | Mod: ,,,

## 2024-01-31 PROCEDURE — 73560 X-RAY EXAM OF KNEE 1 OR 2: CPT | Mod: LT,,, | Performed by: ORTHOPAEDIC SURGERY

## 2024-01-31 NOTE — PROGRESS NOTES
Subjective:       Patient ID: Briseyda Torres is a 63 y.o. female.    Chief Complaint   Patient presents with    Follow-up     3 mths out, orif left patella sx 10-27-23, pt states doing good, has no complaints of pain, ambulating with crutches, has no other complaints at this time,         Patient is here today approximately 3 months status post open reduction internal fixation of right patella and approximately 2 months status post irrigation debridement with secondary closure of surgical wound dehiscence.  She is doing very well today and reports improvements in her pain and swelling since her prior visit.  She does continue to have some stiffness in the operative knee, but has recently missed a few physical therapy sessions secondary to influenza.  She has resumed formal physical therapy and they are working on strengthening the operative extremity and enhancing range of motion.  She is ambulating with the assistance of crutches today because she sometimes feels as if her knee is unstable and may give out on her.    Follow-up  Pertinent negatives include no abdominal pain, chest pain, chills, congestion, coughing, fever, nausea, neck pain, numbness or vomiting.       Review of Systems   Constitutional: Negative for chills, fever and malaise/fatigue.   HENT:  Negative for congestion and hearing loss.    Eyes:  Negative for visual disturbance.   Cardiovascular:  Negative for chest pain and syncope.   Respiratory:  Negative for cough and shortness of breath.    Hematologic/Lymphatic: Does not bruise/bleed easily.   Skin:  Negative for color change and suspicious lesions.   Musculoskeletal:  Negative for falls and neck pain.   Gastrointestinal:  Negative for abdominal pain, nausea and vomiting.   Genitourinary:  Negative for dysuria and hematuria.   Neurological:  Negative for numbness and sensory change.   Psychiatric/Behavioral:  Negative for altered mental status. The patient is not nervous/anxious.      "    Current Outpatient Medications on File Prior to Visit   Medication Sig Dispense Refill    ALPRAZolam (XANAX) 1 MG tablet Take 1 mg by mouth every evening.      EPINEPHrine (EPIPEN) 0.3 mg/0.3 mL AtIn ADMINISTER 1 PEN INJECTOR IN THE MUSCLE AS NEEDED FOR ANAPHYLAXIS      EScitalopram oxalate (LEXAPRO) 10 MG tablet Take 20 mg by mouth every evening.      levothyroxine (SYNTHROID) 100 MCG tablet Take 100 mcg by mouth once daily.      liothyronine (CYTOMEL) 5 MCG Tab Take 5 mcg by mouth every morning.      losartan (COZAAR) 25 MG tablet Take 25 mg by mouth every evening.      mv-min/iron/folic/calcium/vitK (WOMEN'S 50 PLUS MULTIVIT-IRON ORAL) Take 1 tablet by mouth once daily.      oxyCODONE-acetaminophen (PERCOCET)  mg per tablet Take 1 tablet by mouth every 12 (twelve) hours as needed for Pain. 14 tablet 0    promethazine (PHENERGAN) 25 MG tablet Take 25 mg by mouth every 6 (six) hours as needed for Nausea.      rosuvastatin (CRESTOR) 20 MG tablet Take 20 mg by mouth once daily.      sumatriptan (IMITREX) 25 MG Tab Take 1 tablet by mouth daily as needed for Migraine.      amLODIPine (NORVASC) 2.5 MG tablet Take 1 tablet (2.5 mg total) by mouth once daily. 30 tablet 2     No current facility-administered medications on file prior to visit.          Objective:      BP (!) 153/91 (BP Location: Left arm, Patient Position: Sitting, BP Method: Small (Automatic))   Pulse 64   Ht 5' 6" (1.676 m)   Wt 92.1 kg (203 lb)   BMI 32.77 kg/m²   Physical Exam  Constitutional:       General: She is not in acute distress.     Appearance: Normal appearance. She is not ill-appearing.   HENT:      Head: Normocephalic and atraumatic.      Nose: No congestion.   Eyes:      Extraocular Movements: Extraocular movements intact.   Cardiovascular:      Rate and Rhythm: Normal rate and regular rhythm.      Pulses: Normal pulses.   Pulmonary:      Effort: Pulmonary effort is normal.      Breath sounds: Normal breath sounds. "   Abdominal:      General: There is no distension.      Palpations: Abdomen is soft.      Tenderness: There is no abdominal tenderness.   Musculoskeletal:      Comments: Left lower extremity:  Surgical incision is well healed.  Mild swelling.  Her hardware is palpable though not painful or prominent.  She tolerates passive range of motion of the left knee from 0-90 degrees without significant discomfort.  She is got 5/5 motor strength distally in the ankle and digits.  She is got good tone in her quads with active extension, though there is some quadriceps atrophy noted when compared to the contralateral extremity.   Skin:     General: Skin is warm and dry.   Neurological:      Mental Status: She is alert and oriented to person, place, and time. Mental status is at baseline.   Psychiatric:         Mood and Affect: Mood normal.         Behavior: Behavior normal.         Thought Content: Thought content normal.         Judgment: Judgment normal.        Body mass index is 32.77 kg/m².    Radiology:   Left knee two views:  Hardware intact.  Alignment maintained.        Assessment:         1. Closed displaced comminuted fracture of left patella with routine healing, subsequent encounter  X-Ray Knee 1 or 2 View Left              Plan:         X-rays were reviewed with the patient.  She is doing well today and she is quite pleased with her progress.  She will continue to work with physical therapy on range motion and strengthening to the knee.  We discussed that the feelings of instability or likely related to some continued quadriceps atrophy.  Continue weight-bearing as tolerated to the operative extremity and utilizing assistive devices as needed for ambulation.  She will return to clinic in approximately 2 months for re-evaluation, this should be her final visit.  She verbalized understanding of the plan of care with no further questions.      This note/OR report was created with the assistance of  voice recognition  software or phone  dictation.  There may be transcription errors as a result of using this technology however minimal. Effort has been made to assure accuracy of transcription but any obvious errors or omissions should be clarified with the author of the document.       Follow up in about 2 months (around 3/31/2024) for Reevaluation.    Closed displaced comminuted fracture of left patella with routine healing, subsequent encounter  -     X-Ray Knee 1 or 2 View Left; Future; Expected date: 01/31/2024              Orders Placed This Encounter   Procedures    X-Ray Knee 1 or 2 View Left     Standing Status:   Future     Number of Occurrences:   1     Standing Expiration Date:   1/30/2025     Order Specific Question:   May the Radiologist modify the order per protocol to meet the clinical needs of the patient?     Answer:   Yes     Order Specific Question:   Release to patient     Answer:   Immediate       Future Appointments   Date Time Provider Department Center   4/2/2024 10:30 AM Curtis Corona MD [facility]

## 2024-02-29 DIAGNOSIS — S82.042D CLOSED DISPLACED COMMINUTED FRACTURE OF LEFT PATELLA WITH ROUTINE HEALING, SUBSEQUENT ENCOUNTER: Primary | ICD-10-CM

## 2024-03-13 ENCOUNTER — PATIENT MESSAGE (OUTPATIENT)
Dept: ORTHOPEDICS | Facility: CLINIC | Age: 64
End: 2024-03-13
Payer: MEDICARE

## 2024-03-25 ENCOUNTER — PATIENT MESSAGE (OUTPATIENT)
Dept: ORTHOPEDICS | Facility: CLINIC | Age: 64
End: 2024-03-25
Payer: MEDICARE

## 2024-03-26 ENCOUNTER — TELEPHONE (OUTPATIENT)
Dept: ORTHOPEDICS | Facility: CLINIC | Age: 64
End: 2024-03-26
Payer: MEDICARE

## 2024-03-27 ENCOUNTER — PATIENT MESSAGE (OUTPATIENT)
Dept: ORTHOPEDICS | Facility: CLINIC | Age: 64
End: 2024-03-27

## 2024-03-27 ENCOUNTER — HOSPITAL ENCOUNTER (OUTPATIENT)
Dept: RADIOLOGY | Facility: CLINIC | Age: 64
Discharge: HOME | End: 2024-03-27
Attending: ORTHOPAEDIC SURGERY
Payer: MEDICARE

## 2024-03-27 ENCOUNTER — OFFICE VISIT (OUTPATIENT)
Dept: ORTHOPEDICS | Facility: CLINIC | Age: 64
End: 2024-03-27
Payer: MEDICARE

## 2024-03-27 VITALS
SYSTOLIC BLOOD PRESSURE: 171 MMHG | HEART RATE: 72 BPM | WEIGHT: 203.06 LBS | HEIGHT: 66 IN | BODY MASS INDEX: 32.64 KG/M2 | DIASTOLIC BLOOD PRESSURE: 89 MMHG

## 2024-03-27 DIAGNOSIS — S82.042D CLOSED DISPLACED COMMINUTED FRACTURE OF LEFT PATELLA WITH ROUTINE HEALING, SUBSEQUENT ENCOUNTER: ICD-10-CM

## 2024-03-27 DIAGNOSIS — T84.84XA PAINFUL ORTHOPAEDIC HARDWARE: ICD-10-CM

## 2024-03-27 DIAGNOSIS — S82.042K CLOSED DISPLACED COMMINUTED FRACTURE OF LEFT PATELLA WITH NONUNION, SUBSEQUENT ENCOUNTER: Primary | ICD-10-CM

## 2024-03-27 DIAGNOSIS — S82.042S CLOSED DISPLACED COMMINUTED FRACTURE OF LEFT PATELLA, SEQUELA: Primary | ICD-10-CM

## 2024-03-27 PROCEDURE — 73560 X-RAY EXAM OF KNEE 1 OR 2: CPT | Mod: LT,,, | Performed by: ORTHOPAEDIC SURGERY

## 2024-03-27 PROCEDURE — 99213 OFFICE O/P EST LOW 20 MIN: CPT | Mod: 57,,, | Performed by: ORTHOPAEDIC SURGERY

## 2024-03-27 RX ORDER — MUPIROCIN 20 MG/G
OINTMENT TOPICAL
Status: CANCELLED | OUTPATIENT
Start: 2024-03-27

## 2024-03-27 NOTE — H&P (VIEW-ONLY)
Subjective:       Patient ID: Briseyda Torres is a 63 y.o. female.    Chief Complaint   Patient presents with    Follow-up     5 mths f/u, orif left patella sx 10-27-23, having severe pain, does have a noticeable bump on knee, states feels like broken glass at times, ambulating with crutches, WBAT, has no other complaints at this time,         Patient is here today for evaluation of her left knee.  She is 5 months status post open reduction internal fixation of left patella fracture.  She has been working with physical therapy.  She states that she has had no new trauma however she has had pain in the anterior aspect of her knee.  This began over the last few weeks.  She feels as though she has prominence of hardware over the anterior aspect of the knee.  She has weakness with attempted extension of the left lower extremity.  She is back to walking with a crutch.  She was here earlier than her scheduled appointment for evaluation.  No open wounds, no drainage.    Follow-up  Pertinent negatives include no abdominal pain, chest pain, chills, congestion, coughing, fever, nausea, neck pain, numbness or vomiting.       Review of Systems   Constitutional: Negative for chills, fever and malaise/fatigue.   HENT:  Negative for congestion and hearing loss.    Eyes:  Negative for visual disturbance.   Cardiovascular:  Negative for chest pain and syncope.   Respiratory:  Negative for cough and shortness of breath.    Hematologic/Lymphatic: Does not bruise/bleed easily.   Skin:  Negative for color change and suspicious lesions.   Musculoskeletal:  Negative for falls and neck pain.   Gastrointestinal:  Negative for abdominal pain, nausea and vomiting.   Genitourinary:  Negative for dysuria and hematuria.   Neurological:  Negative for numbness and sensory change.   Psychiatric/Behavioral:  Negative for altered mental status. The patient is not nervous/anxious.         Current Outpatient Medications on File Prior to Visit  "  Medication Sig Dispense Refill    ALPRAZolam (XANAX) 1 MG tablet Take 1 mg by mouth every evening.      EPINEPHrine (EPIPEN) 0.3 mg/0.3 mL AtIn ADMINISTER 1 PEN INJECTOR IN THE MUSCLE AS NEEDED FOR ANAPHYLAXIS      EScitalopram oxalate (LEXAPRO) 10 MG tablet Take 20 mg by mouth every evening.      levothyroxine (SYNTHROID) 100 MCG tablet Take 100 mcg by mouth once daily.      liothyronine (CYTOMEL) 5 MCG Tab Take 5 mcg by mouth every morning.      losartan (COZAAR) 25 MG tablet Take 25 mg by mouth every evening.      mv-min/iron/folic/calcium/vitK (WOMEN'S 50 PLUS MULTIVIT-IRON ORAL) Take 1 tablet by mouth once daily.      promethazine (PHENERGAN) 25 MG tablet Take 25 mg by mouth every 6 (six) hours as needed for Nausea.      rosuvastatin (CRESTOR) 20 MG tablet Take 20 mg by mouth once daily.      sumatriptan (IMITREX) 25 MG Tab Take 1 tablet by mouth daily as needed for Migraine.      amLODIPine (NORVASC) 2.5 MG tablet Take 1 tablet (2.5 mg total) by mouth once daily. 30 tablet 2     No current facility-administered medications on file prior to visit.          Objective:      BP (!) 171/89 (BP Location: Right arm, Patient Position: Sitting, BP Method: Small (Automatic)) Comment: states in severe pain  Pulse 72   Ht 5' 6" (1.676 m)   Wt 92.1 kg (203 lb 0.7 oz)   BMI 32.77 kg/m²   Physical Exam  Constitutional:       General: She is not in acute distress.     Appearance: Normal appearance. She is not ill-appearing.   HENT:      Head: Normocephalic and atraumatic.      Nose: No congestion.   Eyes:      Extraocular Movements: Extraocular movements intact.   Cardiovascular:      Rate and Rhythm: Normal rate and regular rhythm.      Pulses: Normal pulses.   Pulmonary:      Effort: Pulmonary effort is normal.      Breath sounds: Normal breath sounds.   Abdominal:      General: There is no distension.      Palpations: Abdomen is soft.      Tenderness: There is no abdominal tenderness.   Musculoskeletal:      " Comments: Left lower extremity:  Surgical incision is well healed.  No painful or prominent hardware with the knee in full extension however whenever she performs flexion she does have some prominence over the anterior aspect of the knee with palpable hardware an exquisite tenderness.  She has some mild crepitus with range motion of the knee.  She is unable to perform a full straight leg raise, she has a 5 degree extensor lag.  Passively I am able to obtain full extension.  Passively I am also obtain to obtain a proximally 115° of flexion.  She has pain at the terminal ends of motion actively.   Skin:     General: Skin is warm and dry.   Neurological:      Mental Status: She is alert and oriented to person, place, and time. Mental status is at baseline.   Psychiatric:         Mood and Affect: Mood normal.         Behavior: Behavior normal.         Thought Content: Thought content normal.         Judgment: Judgment normal.        Body mass index is 32.77 kg/m².    Radiology:   X-rays of the left knee, two views:  Her hardware is intact proximally however she has had displacement of the distal pole with prominence of screws in the distal segment.      Assessment:         1. 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 Painful orthopaedic hardware  Place in Outpatient    Case Request Operating Room: REVISION ORIF, FRACTURE, PATELLA    Vital signs    Insert peripheral IV    Verify informed consent    Diet NPO    Full code    Comprehensive Metabolic Panel    CBC Auto Differential    Insert peripheral IV    Verify informed consent    Diet NPO               Plan:       Unfortunately she has had a separation of the comminution of the inferior pole of her patella fracture with prominence of her hardware proximally.  This is exquisitely painful.  She is able to ambulate without a brace however she has pain and discomfort with deep flexion.  We had an extensive discussion regarding our findings today on x-ray and plans for future treatment.  She states she can not live with her knee the way that it is would like to have a revision operation.  We will plan to take her back to the operating room to remove the prominent hardware as well as augment her repair of her distal pole of her patella with excision of the nonunited fragments. The risks, benefits and alternatives treatment were discussed at length with the patient today including but not limited to pain, bleeding, scarring, infection, damage to neurovascular structures, malunion/nonunion, hardware failure/irritation, need for future procedures and complications leading to amputation and even death.  Plan to take her to the operating room at her convenience which will be on April 9th for revision internal fixation.  All questions and concerns were addressed.  We will provide her with a prescription for low-dose Percocet until surgery.    This note/OR report was created with the assistance of  voice recognition software or phone  dictation.  There may be transcription errors as a result of using this technology however minimal. Effort has been made to assure accuracy of transcription but any obvious errors or omissions should be clarified with the author of the document.         Curtis Corona MD  Orthopedic Trauma  [facility]      No follow-ups on file.    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, subsequent encounter  -     X-Ray Knee 1 or 2 View Left; Future; Expected date: 03/27/2024  -     Place in Outpatient; Standing  -     Case Request Operating Room: REVISION  ORIF, FRACTURE, PATELLA  -     Vital signs; Standing  -     Insert peripheral IV; Standing  -     Verify informed consent; Standing  -     Diet NPO; Standing  -     Full code; Standing  -     Comprehensive Metabolic Panel; Future; Expected date: 03/27/2024  -     CBC Auto Differential; Future; Expected date: 03/27/2024    Painful orthopaedic hardware  -     Place in Outpatient; Standing  -     Case Request Operating Room: REVISION ORIF, FRACTURE, PATELLA  -     Vital signs; Standing  -     Insert peripheral IV; Standing  -     Verify informed consent; Standing  -     Diet NPO; Standing  -     Full code; Standing  -     Comprehensive Metabolic Panel; Future; Expected date: 03/27/2024  -     CBC Auto Differential; Future; Expected date: 03/27/2024    Other orders  -     ceFAZolin (ANCEF) 2 g in dextrose 5 % (D5W) 50 mL IVPB  -     mupirocin 2 % ointment  -     IP VTE HIGH RISK PATIENT; Standing              Orders Placed This Encounter   Procedures    X-Ray Knee 1 or 2 View Left     Standing Status:   Future     Number of Occurrences:   1     Standing Expiration Date:   3/26/2025     Order Specific Question:   May the Radiologist modify the order per protocol to meet the clinical needs of the patient?     Answer:   Yes     Order Specific Question:   Release to patient     Answer:   Immediate    Comprehensive Metabolic Panel     Standing Status:   Future     Standing Expiration Date:   6/25/2025    CBC Auto Differential     Standing Status:   Future     Standing Expiration Date:   6/25/2025    Diet NPO     Specify start time     Standing Status:   Standing     Number of Occurrences:   1    Verify informed consent     Standing Status:   Standing     Number of Occurrences:   1    Insert peripheral IV     Standing Status:   Standing     Number of Occurrences:   1    Case Request Operating Room: REVISION ORIF, FRACTURE, PATELLA     Order Specific Question:   Medical Necessity:     Answer:   Medically Urgent [101]     Order  Specific Question:   CPT Code:     Answer:   OH OPEN RX PATELLA FX [49525]     Order Specific Question:   CPT Code:     Answer:   [place] REMOVAL DEEP IMPLANT [04896]     Order Specific Question:   Implant Required:     Answer:   Yes [1000]     Order Specific Question:   Is an on-site pathologist required for this procedure?     Answer:   N/A       No future appointments.

## 2024-03-27 NOTE — PROGRESS NOTES
Subjective:       Patient ID: Briseyda Torres is a 63 y.o. female.    Chief Complaint   Patient presents with    Follow-up     5 mths f/u, orif left patella sx 10-27-23, having severe pain, does have a noticeable bump on knee, states feels like broken glass at times, ambulating with crutches, WBAT, has no other complaints at this time,         Patient is here today for evaluation of her left knee.  She is 5 months status post open reduction internal fixation of left patella fracture.  She has been working with physical therapy.  She states that she has had no new trauma however she has had pain in the anterior aspect of her knee.  This began over the last few weeks.  She feels as though she has prominence of hardware over the anterior aspect of the knee.  She has weakness with attempted extension of the left lower extremity.  She is back to walking with a crutch.  She was here earlier than her scheduled appointment for evaluation.  No open wounds, no drainage.    Follow-up  Pertinent negatives include no abdominal pain, chest pain, chills, congestion, coughing, fever, nausea, neck pain, numbness or vomiting.       Review of Systems   Constitutional: Negative for chills, fever and malaise/fatigue.   HENT:  Negative for congestion and hearing loss.    Eyes:  Negative for visual disturbance.   Cardiovascular:  Negative for chest pain and syncope.   Respiratory:  Negative for cough and shortness of breath.    Hematologic/Lymphatic: Does not bruise/bleed easily.   Skin:  Negative for color change and suspicious lesions.   Musculoskeletal:  Negative for falls and neck pain.   Gastrointestinal:  Negative for abdominal pain, nausea and vomiting.   Genitourinary:  Negative for dysuria and hematuria.   Neurological:  Negative for numbness and sensory change.   Psychiatric/Behavioral:  Negative for altered mental status. The patient is not nervous/anxious.         Current Outpatient Medications on File Prior to Visit  "  Medication Sig Dispense Refill    ALPRAZolam (XANAX) 1 MG tablet Take 1 mg by mouth every evening.      EPINEPHrine (EPIPEN) 0.3 mg/0.3 mL AtIn ADMINISTER 1 PEN INJECTOR IN THE MUSCLE AS NEEDED FOR ANAPHYLAXIS      EScitalopram oxalate (LEXAPRO) 10 MG tablet Take 20 mg by mouth every evening.      levothyroxine (SYNTHROID) 100 MCG tablet Take 100 mcg by mouth once daily.      liothyronine (CYTOMEL) 5 MCG Tab Take 5 mcg by mouth every morning.      losartan (COZAAR) 25 MG tablet Take 25 mg by mouth every evening.      mv-min/iron/folic/calcium/vitK (WOMEN'S 50 PLUS MULTIVIT-IRON ORAL) Take 1 tablet by mouth once daily.      promethazine (PHENERGAN) 25 MG tablet Take 25 mg by mouth every 6 (six) hours as needed for Nausea.      rosuvastatin (CRESTOR) 20 MG tablet Take 20 mg by mouth once daily.      sumatriptan (IMITREX) 25 MG Tab Take 1 tablet by mouth daily as needed for Migraine.      amLODIPine (NORVASC) 2.5 MG tablet Take 1 tablet (2.5 mg total) by mouth once daily. 30 tablet 2     No current facility-administered medications on file prior to visit.          Objective:      BP (!) 171/89 (BP Location: Right arm, Patient Position: Sitting, BP Method: Small (Automatic)) Comment: states in severe pain  Pulse 72   Ht 5' 6" (1.676 m)   Wt 92.1 kg (203 lb 0.7 oz)   BMI 32.77 kg/m²   Physical Exam  Constitutional:       General: She is not in acute distress.     Appearance: Normal appearance. She is not ill-appearing.   HENT:      Head: Normocephalic and atraumatic.      Nose: No congestion.   Eyes:      Extraocular Movements: Extraocular movements intact.   Cardiovascular:      Rate and Rhythm: Normal rate and regular rhythm.      Pulses: Normal pulses.   Pulmonary:      Effort: Pulmonary effort is normal.      Breath sounds: Normal breath sounds.   Abdominal:      General: There is no distension.      Palpations: Abdomen is soft.      Tenderness: There is no abdominal tenderness.   Musculoskeletal:      " Comments: Left lower extremity:  Surgical incision is well healed.  No painful or prominent hardware with the knee in full extension however whenever she performs flexion she does have some prominence over the anterior aspect of the knee with palpable hardware an exquisite tenderness.  She has some mild crepitus with range motion of the knee.  She is unable to perform a full straight leg raise, she has a 5 degree extensor lag.  Passively I am able to obtain full extension.  Passively I am also obtain to obtain a proximally 115° of flexion.  She has pain at the terminal ends of motion actively.   Skin:     General: Skin is warm and dry.   Neurological:      Mental Status: She is alert and oriented to person, place, and time. Mental status is at baseline.   Psychiatric:         Mood and Affect: Mood normal.         Behavior: Behavior normal.         Thought Content: Thought content normal.         Judgment: Judgment normal.        Body mass index is 32.77 kg/m².    Radiology:   X-rays of the left knee, two views:  Her hardware is intact proximally however she has had displacement of the distal pole with prominence of screws in the distal segment.      Assessment:         1. 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 Painful orthopaedic hardware  Place in Outpatient    Case Request Operating Room: REVISION ORIF, FRACTURE, PATELLA    Vital signs    Insert peripheral IV    Verify informed consent    Diet NPO    Full code    Comprehensive Metabolic Panel    CBC Auto Differential    Insert peripheral IV    Verify informed consent    Diet NPO               Plan:       Unfortunately she has had a separation of the comminution of the inferior pole of her patella fracture with prominence of her hardware proximally.  This is exquisitely painful.  She is able to ambulate without a brace however she has pain and discomfort with deep flexion.  We had an extensive discussion regarding our findings today on x-ray and plans for future treatment.  She states she can not live with her knee the way that it is would like to have a revision operation.  We will plan to take her back to the operating room to remove the prominent hardware as well as augment her repair of her distal pole of her patella with excision of the nonunited fragments. The risks, benefits and alternatives treatment were discussed at length with the patient today including but not limited to pain, bleeding, scarring, infection, damage to neurovascular structures, malunion/nonunion, hardware failure/irritation, need for future procedures and complications leading to amputation and even death.  Plan to take her to the operating room at her convenience which will be on April 9th for revision internal fixation.  All questions and concerns were addressed.  We will provide her with a prescription for low-dose Percocet until surgery.    This note/OR report was created with the assistance of  voice recognition software or phone  dictation.  There may be transcription errors as a result of using this technology however minimal. Effort has been made to assure accuracy of transcription but any obvious errors or omissions should be clarified with the author of the document.         Curtis Corona MD  Orthopedic Trauma  [facility]      No follow-ups on file.    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, subsequent encounter  -     X-Ray Knee 1 or 2 View Left; Future; Expected date: 03/27/2024  -     Place in Outpatient; Standing  -     Case Request Operating Room: REVISION  ORIF, FRACTURE, PATELLA  -     Vital signs; Standing  -     Insert peripheral IV; Standing  -     Verify informed consent; Standing  -     Diet NPO; Standing  -     Full code; Standing  -     Comprehensive Metabolic Panel; Future; Expected date: 03/27/2024  -     CBC Auto Differential; Future; Expected date: 03/27/2024    Painful orthopaedic hardware  -     Place in Outpatient; Standing  -     Case Request Operating Room: REVISION ORIF, FRACTURE, PATELLA  -     Vital signs; Standing  -     Insert peripheral IV; Standing  -     Verify informed consent; Standing  -     Diet NPO; Standing  -     Full code; Standing  -     Comprehensive Metabolic Panel; Future; Expected date: 03/27/2024  -     CBC Auto Differential; Future; Expected date: 03/27/2024    Other orders  -     ceFAZolin (ANCEF) 2 g in dextrose 5 % (D5W) 50 mL IVPB  -     mupirocin 2 % ointment  -     IP VTE HIGH RISK PATIENT; Standing              Orders Placed This Encounter   Procedures    X-Ray Knee 1 or 2 View Left     Standing Status:   Future     Number of Occurrences:   1     Standing Expiration Date:   3/26/2025     Order Specific Question:   May the Radiologist modify the order per protocol to meet the clinical needs of the patient?     Answer:   Yes     Order Specific Question:   Release to patient     Answer:   Immediate    Comprehensive Metabolic Panel     Standing Status:   Future     Standing Expiration Date:   6/25/2025    CBC Auto Differential     Standing Status:   Future     Standing Expiration Date:   6/25/2025    Diet NPO     Specify start time     Standing Status:   Standing     Number of Occurrences:   1    Verify informed consent     Standing Status:   Standing     Number of Occurrences:   1    Insert peripheral IV     Standing Status:   Standing     Number of Occurrences:   1    Case Request Operating Room: REVISION ORIF, FRACTURE, PATELLA     Order Specific Question:   Medical Necessity:     Answer:   Medically Urgent [101]     Order  Specific Question:   CPT Code:     Answer:   TX OPEN RX PATELLA FX [94424]     Order Specific Question:   CPT Code:     Answer:   TX REMOVAL DEEP IMPLANT [46579]     Order Specific Question:   Implant Required:     Answer:   Yes [1000]     Order Specific Question:   Is an on-site pathologist required for this procedure?     Answer:   N/A       No future appointments.

## 2024-03-28 RX ORDER — OXYCODONE AND ACETAMINOPHEN 5; 325 MG/1; MG/1
1 TABLET ORAL EVERY 8 HOURS PRN
Qty: 21 TABLET | Refills: 0 | Status: SHIPPED | OUTPATIENT
Start: 2024-03-28 | End: 2024-04-04

## 2024-04-02 DIAGNOSIS — M72.2 PLANTAR FASCIITIS: ICD-10-CM

## 2024-04-02 DIAGNOSIS — M32.9 LUPUS: ICD-10-CM

## 2024-04-02 DIAGNOSIS — S82.042K CLOSED DISPLACED COMMINUTED FRACTURE OF LEFT PATELLA WITH NONUNION, SUBSEQUENT ENCOUNTER: Primary | ICD-10-CM

## 2024-04-03 ENCOUNTER — ANESTHESIA EVENT (OUTPATIENT)
Dept: SURGERY | Facility: HOSPITAL | Age: 64
End: 2024-04-03
Payer: MEDICARE

## 2024-04-03 RX ORDER — EZETIMIBE 10 MG/1
10 TABLET ORAL DAILY
COMMUNITY

## 2024-04-03 RX ORDER — HYDROXYZINE HYDROCHLORIDE 50 MG/1
50 TABLET, FILM COATED ORAL 3 TIMES DAILY PRN
COMMUNITY
Start: 2023-11-09

## 2024-04-03 RX ORDER — ESTRADIOL 1 MG/1
1 TABLET ORAL DAILY
COMMUNITY
Start: 2024-02-07

## 2024-04-05 ENCOUNTER — HOSPITAL ENCOUNTER (OUTPATIENT)
Dept: RADIOLOGY | Facility: HOSPITAL | Age: 64
Discharge: HOME | End: 2024-04-05
Attending: NURSE PRACTITIONER
Payer: MEDICARE

## 2024-04-05 DIAGNOSIS — Z01.818 PREOP EXAMINATION: ICD-10-CM

## 2024-04-05 PROCEDURE — 71046 X-RAY EXAM CHEST 2 VIEWS: CPT | Mod: TC

## 2024-04-06 ENCOUNTER — PATIENT MESSAGE (OUTPATIENT)
Dept: ADMINISTRATIVE | Facility: OTHER | Age: 64
End: 2024-04-06
Payer: MEDICARE

## 2024-04-07 ENCOUNTER — PATIENT MESSAGE (OUTPATIENT)
Dept: ADMINISTRATIVE | Facility: OTHER | Age: 64
End: 2024-04-07
Payer: MEDICARE

## 2024-04-08 NOTE — ANESTHESIA PREPROCEDURE EVALUATION
"                                                                                                             04/08/2024  Briseyda Torres is a 63 y.o., female with lupus/fibromyalgia presents as an outpatient for left patella ORIF revision.  EKG normal sinus rhythm.    Last 3 sets of Vitals        3/27/2024     9:51 AM 4/3/2024    10:51 AM 4/9/2024     5:53 AM   Vitals - 1 value per visit   SYSTOLIC 171  176   DIASTOLIC 89  89   Pulse 72  64   Temp   36.8 °C (98.3 °F)   Resp   17   SPO2   98 %   Weight (lb) 203.04 203    Weight (kg) 92.1 92.08    Height 5' 6" (1.676 m) 5' 6" (1.676 m)    BMI (Calculated) 32.8 32.8          Lab Results   Component Value Date    WBC 7.12 04/05/2024    HGB 12.9 04/05/2024    HCT 40.3 04/05/2024    MCV 87.4 04/05/2024     04/05/2024          BMP  Lab Results   Component Value Date     04/05/2024    K 3.9 04/05/2024    CO2 26 04/05/2024    BUN 18.7 04/05/2024    CREATININE 0.83 04/05/2024    CALCIUM 10.2 04/05/2024          Pre-op Assessment    I have reviewed the Patient Summary Reports.    I have reviewed the NPO Status.   I have reviewed the Medications.     Review of Systems  Anesthesia Hx:               Denies Personal Hx of Anesthesia complications.                    Social:  Non-Smoker       Cardiovascular:     Hypertension                Functional Capacity unable to determine                         Hepatic/GI:     GERD             Neurological:   CVA                                    Endocrine:        Obesity / BMI > 30      Physical Exam  General: Well nourished, Cooperative, Alert and Oriented    Airway:  Mallampati: II   Mouth Opening: Normal  TM Distance: Normal  Tongue: Normal  Neck ROM: Normal ROM    Dental:  Intact    Chest/Lungs:  Clear to auscultation, Normal Respiratory Rate    Heart:  Rate: Normal  Rhythm: Regular Rhythm        Anesthesia Plan  Type of Anesthesia, risks & benefits discussed:    Anesthesia Type: Gen Supraglottic Airway  Intra-op " Monitoring Plan: Standard ASA Monitors  Post Op Pain Control Plan: multimodal analgesia, IV/PO Opioids PRN and peripheral nerve block  Induction:  IV  Airway Plan: Direct  Informed Consent: Informed consent signed with the Patient and all parties understand the risks and agree with anesthesia plan.  All questions answered.   ASA Score: 2  Day of Surgery Review of History & Physical: H&P Update referred to the surgeon/provider.  Anesthesia Plan Notes: Femoral nerve block for postoperative pain after discussion with surgeon    Ready For Surgery From Anesthesia Perspective.     .

## 2024-04-09 ENCOUNTER — ANESTHESIA (OUTPATIENT)
Dept: SURGERY | Facility: HOSPITAL | Age: 64
End: 2024-04-09
Payer: MEDICARE

## 2024-04-09 ENCOUNTER — HOSPITAL ENCOUNTER (OUTPATIENT)
Facility: HOSPITAL | Age: 64
Discharge: HOME | End: 2024-04-09
Attending: ORTHOPAEDIC SURGERY | Admitting: ORTHOPAEDIC SURGERY
Payer: MEDICARE

## 2024-04-09 ENCOUNTER — HOSPITAL ENCOUNTER (OUTPATIENT)
Dept: RADIOLOGY | Facility: HOSPITAL | Age: 64
Discharge: HOME | End: 2024-04-09
Attending: ORTHOPAEDIC SURGERY | Admitting: ORTHOPAEDIC SURGERY
Payer: MEDICARE

## 2024-04-09 DIAGNOSIS — S82.042K CLOSED DISPLACED COMMINUTED FRACTURE OF LEFT PATELLA WITH NONUNION, SUBSEQUENT ENCOUNTER: ICD-10-CM

## 2024-04-09 DIAGNOSIS — S82.002A LEFT PATELLA FRACTURE: ICD-10-CM

## 2024-04-09 DIAGNOSIS — S82.042K CLOSED DISPLACED COMMINUTED FRACTURE OF LEFT PATELLA WITH NONUNION: Primary | ICD-10-CM

## 2024-04-09 DIAGNOSIS — S82.042S CLOSED DISPLACED COMMINUTED FRACTURE OF LEFT PATELLA, SEQUELA: ICD-10-CM

## 2024-04-09 DIAGNOSIS — S82.042K CLOSED DISPLACED COMMINUTED FRACTURE OF LEFT PATELLA WITH NONUNION, SUBSEQUENT ENCOUNTER: Primary | ICD-10-CM

## 2024-04-09 DIAGNOSIS — Z01.818 PREOP EXAMINATION: ICD-10-CM

## 2024-04-09 DIAGNOSIS — T84.84XA PAINFUL ORTHOPAEDIC HARDWARE: ICD-10-CM

## 2024-04-09 LAB — GRAM STN SPEC: NORMAL

## 2024-04-09 PROCEDURE — 63600175 PHARM REV CODE 636 W HCPCS: Performed by: ANESTHESIOLOGY

## 2024-04-09 PROCEDURE — 76000 FLUOROSCOPY <1 HR PHYS/QHP: CPT | Mod: TC

## 2024-04-09 PROCEDURE — 63600175 PHARM REV CODE 636 W HCPCS: Performed by: ORTHOPAEDIC SURGERY

## 2024-04-09 PROCEDURE — 37000009 HC ANESTHESIA EA ADD 15 MINS: Performed by: ORTHOPAEDIC SURGERY

## 2024-04-09 PROCEDURE — 36000710: Performed by: ORTHOPAEDIC SURGERY

## 2024-04-09 PROCEDURE — 87205 SMEAR GRAM STAIN: CPT | Performed by: ORTHOPAEDIC SURGERY

## 2024-04-09 PROCEDURE — 87116 MYCOBACTERIA CULTURE: CPT | Performed by: ORTHOPAEDIC SURGERY

## 2024-04-09 PROCEDURE — 25000003 PHARM REV CODE 250: Performed by: NURSE ANESTHETIST, CERTIFIED REGISTERED

## 2024-04-09 PROCEDURE — 63600175 PHARM REV CODE 636 W HCPCS: Performed by: NURSE ANESTHETIST, CERTIFIED REGISTERED

## 2024-04-09 PROCEDURE — 25000003 PHARM REV CODE 250: Performed by: ANESTHESIOLOGY

## 2024-04-09 PROCEDURE — 87075 CULTR BACTERIA EXCEPT BLOOD: CPT | Performed by: ORTHOPAEDIC SURGERY

## 2024-04-09 PROCEDURE — 27800903 OPTIME MED/SURG SUP & DEVICES OTHER IMPLANTS: Performed by: ORTHOPAEDIC SURGERY

## 2024-04-09 PROCEDURE — 71000015 HC POSTOP RECOV 1ST HR: Performed by: ORTHOPAEDIC SURGERY

## 2024-04-09 PROCEDURE — 71000033 HC RECOVERY, INTIAL HOUR: Performed by: ORTHOPAEDIC SURGERY

## 2024-04-09 PROCEDURE — 37000008 HC ANESTHESIA 1ST 15 MINUTES: Performed by: ORTHOPAEDIC SURGERY

## 2024-04-09 PROCEDURE — 27524 TREAT KNEECAP FRACTURE: CPT | Mod: LT,,, | Performed by: ORTHOPAEDIC SURGERY

## 2024-04-09 PROCEDURE — C1713 ANCHOR/SCREW BN/BN,TIS/BN: HCPCS | Performed by: ORTHOPAEDIC SURGERY

## 2024-04-09 PROCEDURE — D9220A PRA ANESTHESIA: Mod: CRNA,,, | Performed by: NURSE ANESTHETIST, CERTIFIED REGISTERED

## 2024-04-09 PROCEDURE — 87102 FUNGUS ISOLATION CULTURE: CPT | Performed by: ORTHOPAEDIC SURGERY

## 2024-04-09 PROCEDURE — 87070 CULTURE OTHR SPECIMN AEROBIC: CPT | Performed by: ORTHOPAEDIC SURGERY

## 2024-04-09 PROCEDURE — 64447 NJX AA&/STRD FEMORAL NRV IMG: CPT | Performed by: ANESTHESIOLOGY

## 2024-04-09 PROCEDURE — 25000003 PHARM REV CODE 250

## 2024-04-09 PROCEDURE — 71000016 HC POSTOP RECOV ADDL HR: Performed by: ORTHOPAEDIC SURGERY

## 2024-04-09 PROCEDURE — D9220A PRA ANESTHESIA: Mod: ANES,,, | Performed by: ANESTHESIOLOGY

## 2024-04-09 PROCEDURE — 36000711: Performed by: ORTHOPAEDIC SURGERY

## 2024-04-09 PROCEDURE — 27201423 OPTIME MED/SURG SUP & DEVICES STERILE SUPPLY: Performed by: ORTHOPAEDIC SURGERY

## 2024-04-09 DEVICE — PATCH AMNION DUAL LAYER 4X8CM: Type: IMPLANTABLE DEVICE | Site: KNEE | Status: FUNCTIONAL

## 2024-04-09 DEVICE — SYNTHETIC BONE SUBSTITUTE FOR REGENERATING BONE
Type: IMPLANTABLE DEVICE | Site: KNEE | Status: FUNCTIONAL
Brand: BONALIVE ORTHOPEDICS GRANULES

## 2024-04-09 RX ORDER — BUPIVACAINE HYDROCHLORIDE AND EPINEPHRINE 2.5; 5 MG/ML; UG/ML
30 INJECTION, SOLUTION EPIDURAL; INFILTRATION; INTRACAUDAL; PERINEURAL ONCE
Status: DISCONTINUED | OUTPATIENT
Start: 2024-04-09 | End: 2024-04-09

## 2024-04-09 RX ORDER — ONDANSETRON 4 MG/1
8 TABLET, ORALLY DISINTEGRATING ORAL EVERY 6 HOURS PRN
Status: DISCONTINUED | OUTPATIENT
Start: 2024-04-09 | End: 2024-04-09

## 2024-04-09 RX ORDER — OXYCODONE AND ACETAMINOPHEN 10; 325 MG/1; MG/1
1 TABLET ORAL EVERY 4 HOURS PRN
Qty: 42 TABLET | Refills: 0 | Status: SHIPPED | OUTPATIENT
Start: 2024-04-09 | End: 2024-04-24 | Stop reason: SDUPTHER

## 2024-04-09 RX ORDER — HYDROMORPHONE HYDROCHLORIDE 2 MG/ML
0.4 INJECTION, SOLUTION INTRAMUSCULAR; INTRAVENOUS; SUBCUTANEOUS EVERY 5 MIN PRN
Status: DISCONTINUED | OUTPATIENT
Start: 2024-04-09 | End: 2024-04-09

## 2024-04-09 RX ORDER — LIDOCAINE HYDROCHLORIDE 10 MG/ML
1 INJECTION, SOLUTION EPIDURAL; INFILTRATION; INTRACAUDAL; PERINEURAL ONCE
Status: DISCONTINUED | OUTPATIENT
Start: 2024-04-09 | End: 2024-04-09 | Stop reason: HOSPADM

## 2024-04-09 RX ORDER — DEXMEDETOMIDINE HYDROCHLORIDE 100 UG/ML
INJECTION, SOLUTION INTRAVENOUS
Status: DISCONTINUED | OUTPATIENT
Start: 2024-04-09 | End: 2024-04-09

## 2024-04-09 RX ORDER — LIDOCAINE HYDROCHLORIDE 10 MG/ML
1 INJECTION, SOLUTION EPIDURAL; INFILTRATION; INTRACAUDAL; PERINEURAL ONCE
Status: DISCONTINUED | OUTPATIENT
Start: 2024-04-09 | End: 2024-04-09

## 2024-04-09 RX ORDER — SODIUM CHLORIDE, SODIUM GLUCONATE, SODIUM ACETATE, POTASSIUM CHLORIDE AND MAGNESIUM CHLORIDE 30; 37; 368; 526; 502 MG/100ML; MG/100ML; MG/100ML; MG/100ML; MG/100ML
INJECTION, SOLUTION INTRAVENOUS CONTINUOUS
Status: DISCONTINUED | OUTPATIENT
Start: 2024-04-09 | End: 2024-04-09 | Stop reason: HOSPADM

## 2024-04-09 RX ORDER — OXYCODONE HYDROCHLORIDE 5 MG/1
10 TABLET ORAL EVERY 4 HOURS PRN
Status: DISCONTINUED | OUTPATIENT
Start: 2024-04-09 | End: 2024-04-09 | Stop reason: HOSPADM

## 2024-04-09 RX ORDER — ACETAMINOPHEN 10 MG/ML
INJECTION, SOLUTION INTRAVENOUS
Status: DISCONTINUED | OUTPATIENT
Start: 2024-04-09 | End: 2024-04-09

## 2024-04-09 RX ORDER — ONDANSETRON HYDROCHLORIDE 2 MG/ML
INJECTION, SOLUTION INTRAVENOUS
Status: DISCONTINUED | OUTPATIENT
Start: 2024-04-09 | End: 2024-04-09

## 2024-04-09 RX ORDER — GLYCOPYRROLATE 0.2 MG/ML
INJECTION INTRAMUSCULAR; INTRAVENOUS
Status: DISCONTINUED | OUTPATIENT
Start: 2024-04-09 | End: 2024-04-09

## 2024-04-09 RX ORDER — APIXABAN 5 MG/1
5 TABLET, FILM COATED ORAL 2 TIMES DAILY
Status: ON HOLD | COMMUNITY
Start: 2024-03-25 | End: 2024-04-26 | Stop reason: HOSPADM

## 2024-04-09 RX ORDER — BUPIVACAINE HYDROCHLORIDE AND EPINEPHRINE 5; 5 MG/ML; UG/ML
INJECTION, SOLUTION EPIDURAL; INTRACAUDAL; PERINEURAL
Status: COMPLETED
Start: 2024-04-09 | End: 2024-04-09

## 2024-04-09 RX ORDER — METHOCARBAMOL 500 MG/1
500 TABLET, FILM COATED ORAL 3 TIMES DAILY
Qty: 30 TABLET | Refills: 0 | Status: SHIPPED | OUTPATIENT
Start: 2024-04-09 | End: 2024-04-24 | Stop reason: SDUPTHER

## 2024-04-09 RX ORDER — ONDANSETRON HYDROCHLORIDE 2 MG/ML
4 INJECTION, SOLUTION INTRAVENOUS EVERY 6 HOURS PRN
Status: DISCONTINUED | OUTPATIENT
Start: 2024-04-09 | End: 2024-04-09 | Stop reason: HOSPADM

## 2024-04-09 RX ORDER — CEFAZOLIN SODIUM 2 G/50ML
2 SOLUTION INTRAVENOUS
Status: COMPLETED | OUTPATIENT
Start: 2024-04-09 | End: 2024-04-09

## 2024-04-09 RX ORDER — MUPIROCIN 20 MG/G
OINTMENT TOPICAL
Status: DISCONTINUED | OUTPATIENT
Start: 2024-04-09 | End: 2024-04-09 | Stop reason: HOSPADM

## 2024-04-09 RX ORDER — ONDANSETRON HYDROCHLORIDE 2 MG/ML
4 INJECTION, SOLUTION INTRAVENOUS DAILY PRN
Status: DISCONTINUED | OUTPATIENT
Start: 2024-04-09 | End: 2024-04-09

## 2024-04-09 RX ORDER — FENTANYL CITRATE 50 UG/ML
INJECTION, SOLUTION INTRAMUSCULAR; INTRAVENOUS
Status: DISCONTINUED | OUTPATIENT
Start: 2024-04-09 | End: 2024-04-09

## 2024-04-09 RX ORDER — BUPIVACAINE HYDROCHLORIDE AND EPINEPHRINE 2.5; 5 MG/ML; UG/ML
INJECTION, SOLUTION EPIDURAL; INFILTRATION; INTRACAUDAL; PERINEURAL
Status: DISCONTINUED
Start: 2024-04-09 | End: 2024-04-09 | Stop reason: HOSPADM

## 2024-04-09 RX ORDER — MEPERIDINE HYDROCHLORIDE 25 MG/ML
12.5 INJECTION INTRAMUSCULAR; INTRAVENOUS; SUBCUTANEOUS EVERY 10 MIN PRN
Status: DISCONTINUED | OUTPATIENT
Start: 2024-04-09 | End: 2024-04-09 | Stop reason: HOSPADM

## 2024-04-09 RX ORDER — OXYCODONE HYDROCHLORIDE 5 MG/1
5 TABLET ORAL EVERY 6 HOURS PRN
Status: DISCONTINUED | OUTPATIENT
Start: 2024-04-09 | End: 2024-04-09 | Stop reason: HOSPADM

## 2024-04-09 RX ORDER — BUPIVACAINE HYDROCHLORIDE AND EPINEPHRINE 5; 5 MG/ML; UG/ML
INJECTION, SOLUTION EPIDURAL; INTRACAUDAL; PERINEURAL
Status: COMPLETED | OUTPATIENT
Start: 2024-04-09 | End: 2024-04-09

## 2024-04-09 RX ORDER — MIDAZOLAM HYDROCHLORIDE 2 MG/2ML
2 INJECTION, SOLUTION INTRAMUSCULAR; INTRAVENOUS ONCE AS NEEDED
Status: COMPLETED | OUTPATIENT
Start: 2024-04-09 | End: 2024-04-09

## 2024-04-09 RX ORDER — IPRATROPIUM BROMIDE AND ALBUTEROL SULFATE 2.5; .5 MG/3ML; MG/3ML
3 SOLUTION RESPIRATORY (INHALATION)
Status: DISCONTINUED | OUTPATIENT
Start: 2024-04-09 | End: 2024-04-09 | Stop reason: HOSPADM

## 2024-04-09 RX ORDER — ACETAMINOPHEN 325 MG/1
650 TABLET ORAL EVERY 8 HOURS
Status: DISCONTINUED | OUTPATIENT
Start: 2024-04-09 | End: 2024-04-09 | Stop reason: HOSPADM

## 2024-04-09 RX ORDER — HYDROMORPHONE HYDROCHLORIDE 2 MG/ML
0.2 INJECTION, SOLUTION INTRAMUSCULAR; INTRAVENOUS; SUBCUTANEOUS EVERY 5 MIN PRN
Status: DISCONTINUED | OUTPATIENT
Start: 2024-04-09 | End: 2024-04-09 | Stop reason: HOSPADM

## 2024-04-09 RX ORDER — DEXAMETHASONE SODIUM PHOSPHATE 4 MG/ML
INJECTION, SOLUTION INTRA-ARTICULAR; INTRALESIONAL; INTRAMUSCULAR; INTRAVENOUS; SOFT TISSUE
Status: DISCONTINUED | OUTPATIENT
Start: 2024-04-09 | End: 2024-04-09

## 2024-04-09 RX ORDER — MORPHINE SULFATE 10 MG/ML
4 INJECTION INTRAMUSCULAR; INTRAVENOUS; SUBCUTANEOUS EVERY 6 HOURS PRN
Status: DISCONTINUED | OUTPATIENT
Start: 2024-04-09 | End: 2024-04-09 | Stop reason: HOSPADM

## 2024-04-09 RX ORDER — METHOCARBAMOL 500 MG/1
500 TABLET, FILM COATED ORAL 3 TIMES DAILY
Status: DISCONTINUED | OUTPATIENT
Start: 2024-04-09 | End: 2024-04-09 | Stop reason: HOSPADM

## 2024-04-09 RX ORDER — LIDOCAINE HYDROCHLORIDE 20 MG/ML
INJECTION INTRAVENOUS
Status: DISCONTINUED | OUTPATIENT
Start: 2024-04-09 | End: 2024-04-09

## 2024-04-09 RX ORDER — OXYCODONE AND ACETAMINOPHEN 5; 325 MG/1; MG/1
1 TABLET ORAL EVERY 4 HOURS PRN
Status: ON HOLD | COMMUNITY
End: 2024-04-09 | Stop reason: HOSPADM

## 2024-04-09 RX ORDER — EPHEDRINE SULFATE 50 MG/ML
INJECTION, SOLUTION INTRAVENOUS
Status: DISCONTINUED | OUTPATIENT
Start: 2024-04-09 | End: 2024-04-09

## 2024-04-09 RX ORDER — PROPOFOL 10 MG/ML
INJECTION, EMULSION INTRAVENOUS
Status: DISCONTINUED | OUTPATIENT
Start: 2024-04-09 | End: 2024-04-09

## 2024-04-09 RX ORDER — VANCOMYCIN HYDROCHLORIDE 1 G/20ML
INJECTION, POWDER, LYOPHILIZED, FOR SOLUTION INTRAVENOUS
Status: DISCONTINUED | OUTPATIENT
Start: 2024-04-09 | End: 2024-04-09 | Stop reason: HOSPADM

## 2024-04-09 RX ORDER — PROCHLORPERAZINE EDISYLATE 5 MG/ML
5 INJECTION INTRAMUSCULAR; INTRAVENOUS EVERY 30 MIN PRN
Status: DISCONTINUED | OUTPATIENT
Start: 2024-04-09 | End: 2024-04-09 | Stop reason: HOSPADM

## 2024-04-09 RX ORDER — EPINEPHRINE 0.1 MG/ML
INJECTION INTRAVENOUS
Status: DISCONTINUED
Start: 2024-04-09 | End: 2024-04-09 | Stop reason: WASHOUT

## 2024-04-09 RX ORDER — BUPIVACAINE HYDROCHLORIDE AND EPINEPHRINE 2.5; 5 MG/ML; UG/ML
30 INJECTION, SOLUTION EPIDURAL; INFILTRATION; INTRACAUDAL; PERINEURAL ONCE
Status: DISCONTINUED | OUTPATIENT
Start: 2024-04-09 | End: 2024-04-09 | Stop reason: HOSPADM

## 2024-04-09 RX ORDER — KETOROLAC TROMETHAMINE 30 MG/ML
INJECTION, SOLUTION INTRAMUSCULAR; INTRAVENOUS
Status: DISCONTINUED | OUTPATIENT
Start: 2024-04-09 | End: 2024-04-09

## 2024-04-09 RX ADMIN — DEXMEDETOMIDINE 5 MCG: 200 INJECTION, SOLUTION INTRAVENOUS at 08:04

## 2024-04-09 RX ADMIN — SODIUM CHLORIDE, SODIUM GLUCONATE, SODIUM ACETATE, POTASSIUM CHLORIDE AND MAGNESIUM CHLORIDE: 526; 502; 368; 37; 30 INJECTION, SOLUTION INTRAVENOUS at 07:04

## 2024-04-09 RX ADMIN — FENTANYL CITRATE 50 MCG: 50 INJECTION, SOLUTION INTRAMUSCULAR; INTRAVENOUS at 08:04

## 2024-04-09 RX ADMIN — FENTANYL CITRATE 25 MCG: 50 INJECTION, SOLUTION INTRAMUSCULAR; INTRAVENOUS at 08:04

## 2024-04-09 RX ADMIN — ONDANSETRON 4 MG: 2 INJECTION INTRAMUSCULAR; INTRAVENOUS at 08:04

## 2024-04-09 RX ADMIN — FENTANYL CITRATE 50 MCG: 50 INJECTION, SOLUTION INTRAMUSCULAR; INTRAVENOUS at 07:04

## 2024-04-09 RX ADMIN — CEFAZOLIN SODIUM 2 G: 2 SOLUTION INTRAVENOUS at 07:04

## 2024-04-09 RX ADMIN — GLYCOPYRROLATE 0.2 MG: 0.2 INJECTION INTRAMUSCULAR; INTRAVENOUS at 07:04

## 2024-04-09 RX ADMIN — ACETAMINOPHEN 1000 MG: 10 INJECTION, SOLUTION INTRAVENOUS at 08:04

## 2024-04-09 RX ADMIN — DEXAMETHASONE SODIUM PHOSPHATE 4 MG: 4 INJECTION, SOLUTION INTRA-ARTICULAR; INTRALESIONAL; INTRAMUSCULAR; INTRAVENOUS; SOFT TISSUE at 07:04

## 2024-04-09 RX ADMIN — BUPIVACAINE HYDROCHLORIDE AND EPINEPHRINE BITARTRATE 15 ML: 5; .005 INJECTION, SOLUTION EPIDURAL; INTRACAUDAL; PERINEURAL at 06:04

## 2024-04-09 RX ADMIN — MIDAZOLAM HYDROCHLORIDE 2 MG: 1 INJECTION, SOLUTION INTRAMUSCULAR; INTRAVENOUS at 06:04

## 2024-04-09 RX ADMIN — KETOROLAC TROMETHAMINE 30 MG: 30 INJECTION, SOLUTION INTRAMUSCULAR; INTRAVENOUS at 08:04

## 2024-04-09 RX ADMIN — PROPOFOL 150 MG: 10 INJECTION, EMULSION INTRAVENOUS at 07:04

## 2024-04-09 RX ADMIN — OXYCODONE HYDROCHLORIDE 10 MG: 5 TABLET ORAL at 10:04

## 2024-04-09 RX ADMIN — METHOCARBAMOL 500 MG: 500 TABLET ORAL at 10:04

## 2024-04-09 RX ADMIN — LIDOCAINE HYDROCHLORIDE 80 MG: 20 INJECTION INTRAVENOUS at 07:04

## 2024-04-09 RX ADMIN — EPHEDRINE SULFATE 10 MG: 50 INJECTION INTRAVENOUS at 07:04

## 2024-04-09 RX ADMIN — DEXMEDETOMIDINE 5 MCG: 200 INJECTION, SOLUTION INTRAVENOUS at 09:04

## 2024-04-09 NOTE — ANESTHESIA PROCEDURE NOTES
Peripheral Block    Patient location during procedure: pre-op   Block not for primary anesthetic.  Reason for block: at surgeon's request and post-op pain management   Post-op Pain Location: Left knee   Start time: 4/9/2024 6:56 AM  Timeout: 4/9/2024 6:56 AM   End time: 4/9/2024 6:58 AM    Staffing  Authorizing Provider: Luis Daniel Montague Jr., MD  Performing Provider: Luis Daniel Montague Jr., MD    Staffing  Performed by: Luis Daniel Montague Jr., MD  Authorized by: Luis Daniel Montague Jr., MD    Preanesthetic Checklist  Completed: patient identified, IV checked, site marked, risks and benefits discussed, surgical consent, monitors and equipment checked, pre-op evaluation and timeout performed  Peripheral Block  Patient position: supine  Prep: ChloraPrep  Patient monitoring: heart rate, cardiac monitor, continuous pulse ox, continuous capnometry and frequent blood pressure checks  Block type: femoral  Laterality: left  Injection technique: single shot  Needle  Needle type: Stimuplex   Needle gauge: 22 G  Needle length: 2 in  Needle localization: anatomical landmarks and ultrasound guidance   -ultrasound image captured on disc.  Assessment  Injection assessment: negative aspiration, negative parasthesia and local visualized surrounding nerve  Paresthesia pain: none  Heart rate change: no  Slow fractionated injection: yes  Pain Tolerance: comfortable throughout block and no complaints  Medications:    Medications: bupivacaine 0.5%-EPINEPHrine 1:200,000 injection - Perineural   15 mL - 4/9/2024 6:58:00 AM    Additional Notes  VSS.  [facility] RN monitoring vitals throughout procedure.  Patient tolerated procedure well.    Bupivacaine diluted with 15 cc of preservative-free normal saline

## 2024-04-09 NOTE — PLAN OF CARE
fm provided with written/verbal instructions  fm verbalized understanding  Questions answered regarding  BLEEDING: If surgical site begins to bleed , contact your doctor or go to ER    NAUSEA: due to the anesthesia, you may experience nausea for up to 24 hours. If nausea and vomiting last longer, contact your doctor.     INFECTION:  watch for any signs or symptoms of infection such as chills, fever, redness or drainage at surgical site. Notify your doctor     PAIN : take your pain medications as directed. If the pain medications are not helping, notify your doctor.

## 2024-04-09 NOTE — INTERVAL H&P NOTE
The patient has been examined and the H&P has been reviewed:    I concur with the findings and no changes have occurred since H&P was written.    Surgery risks, benefits and alternative options discussed and understood by patient/family.          There are no hospital problems to display for this patient.    Curtis Corona MD  Orthopedic Trauma  [facility]

## 2024-04-09 NOTE — DISCHARGE SUMMARY
[facility]  Discharge Note  Short Stay    Procedure(s) (LRB):  REVISION ORIF, FRACTURE, PATELLA (Left)      OUTCOME: Patient tolerated treatment/procedure well without complication and is now ready for discharge.    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  Procedure included removal of hardware and ORIF of left patella nonunion.  Patient placed in knee immobilizer.  Prevena wound VAC also placed today.  Patient may weightbear as tolerated to the left lower extremity in full extension.  Patient to remain in knee immobilizer with the knee in extension.  Patient will be discharged home.  Medications sent to [facility] pharmacy to  prior to leaving hospital.  Follow up in 1 week for wound check with Dr. Corona.    DISCHARGE INSTRUCTIONS:    Discharge Procedure Orders   X-Ray Chest PA And Lateral   Standing Status: Future Number of Occurrences: 1 Standing Exp. Date: 04/03/25     Order Specific Question Answer Comments   May the Radiologist modify the order per protocol to meet the clinical needs of the patient? Yes    Release to patient Immediate      No driving until:   Order Comments: Cleared by surgeon     Other restrictions (specify):   Order Comments: Do not bend the left knee.  Stay in knee immobilizer except to clean skin and to check wound VAC.     Ice to affected area     Keep surgical extremity elevated     Notify your health care provider if you experience any of the following:   Order Comments: If the wound VAC stopped working, you begin to experience severe pain not controlled with medication, fever, chills, purulent drainage, red streaking up the leg, difficulty breathing, chest pain, or any other emergent symptoms.    Your follow-up appointment will be in 1 week.  We will  remove the wound VAC at that time.  Keep the wound VAC in place until this visit.     Leave dressing on - Keep it clean, dry, and intact until clinic visit     EKG 12-lead   Standing Status: Future Number of Occurrences: 1 Standing Exp. Date: 04/03/25     Weight bearing restrictions (specify):   Order Comments: May weightbear to left lower extremity in full extension and in knee immobilizer.  Do not flex the knee.        TIME SPENT ON DISCHARGE: 20 minutes

## 2024-04-09 NOTE — OP NOTE
[facility]                       [address] [place]                      [place] [address]    PATIENT NAME:      ROSY TORRES  YOB: 1960  CSN:               [id number]  MRN:               [id number]  ADMIT DATE:        04/09/2024 05:28:00  PHYSICIAN:         Curtis Corona MD                          OPERATIVE REPORT      DATE OF SURGERY:    04/09/2024 00:00:00    SURGEON:  Curtis Corona MD    PREOPERATIVE DIAGNOSIS:  Left patella nonunion with hardware failure.    POSTOPERATIVE DIAGNOSIS:  Left patella nonunion with hardware failure.    PROCEDURE:  Open reduction and internal fixation of left patella fracture   nonunion with bone grafting.    ANESTHESIA:  General.    ESTIMATED BLOOD LOSS:  50 mL.    TOURNIQUET TIME:  85 minutes.    IMPLANTS:  Synthes 4.0 mm cannulated screws x2 with 18-gauge wire for cerclage   as well as 5 mL Bonalive bone graft substitute as well as an EvoPatch for repair   of the nonunion to her left knee.    ASSISTANT:  NELDA Weinberg, necessary for a skilled set of hands to assist   with reduction of the fracture as well as application of hardware and deep   closure.    COMPLICATIONS:  None.    COUNTS:  All counts correct x2 at the end of the case.    INDICATIONS FOR PROCEDURE:  Ms. Torres is a 63-year-old female who had a fall   onto her left knee.  She sustained a comminuted intra-articular fracture, left   patella.  She was seen at an outside hospital.  She was referred to me for   management.  She was diagnosed with a DVT.  She was started on blood thinners.    We took her to the operating room, performed repair of her patella with open   reduction and internal fixation using plates and screws.  She had subsequent   hematoma and wound dehiscence that required operative intervention.  She then   healed her incision uneventfully.  We have been monitoring her progression.  She   was doing well and she  was working with physical therapy.  She began having   pain and prominence over the anterior aspect of her knee.  She was referred back   to me for evaluation and was noted to have loosening of her hardware due to a   nonunion of her patella.  The risks, benefits, and alternatives of treatment   were discussed.  She is brought to the operating room for removal of prominent   failed hardware as well as repair of her nonunion.    PROCEDURE IN DETAIL:  After informed consent was obtained, the patient was met   in the preoperative holding area and her site was marked.  She was taken to the   operating room.  She was placed supine on the operating table.  General   anesthesia was induced.  All bony prominences were well padded.  Preoperative   antibiotics were given.  Her left lower extremity was prepped and draped in a   standard sterile fashion.  Time-out was done indicating correct operative limb   and procedure.  The limb was exsanguinated and tourniquet was raised.  Her   previously placed incision over the anterior aspect of the knee was utilized.    It was opened up.  She had loosening of her hardware.  It was removed in its   entirety.  Upon removal of her hardware, she was noted to have a complete   nonunion of her patella.  Her retinaculum had healed.  However, she had gapping   in the midportion with bone loss due to her previous comminution.  She did have   some consolidation in the superior pole and inferior pole.  I was able to   debride the fracture edges back to flat surfaces and able to get good bony   contact.  She was thoroughly irrigated with antimicrobial solution.  The bone   was compressed.  Point of reduction clamps and 2 wires and 4.0 headless screws   were applied.  The screws were placed under fluoroscopic guidance.  An 18-gauge   wire was then used in a cerclage fashion through the screws giving additional   support.  Due to the amount of bone loss, she had irregular surface of her   patella  that left gaps.  Her articular surface had good contact.  We filled in   the gaps with the Bonalive bioactive glass and overlay the EvoPatch.  A gram of   vancomycin was placed deep within the wound and a layered closure was performed   with #1 PDS, 2-0 Monocryl, and staples.  Xeroform and Prevena wound VAC were   applied.  The patient was placed into a knee immobilizer.  She was awakened,   extubated, and taken to recovery in stable condition.    POSTOPERATIVE PLAN:  She will be discharged home today.  She can weightbear as   tolerated to the left knee.  No range of motion currently.  We will keep her in   full extension for approximately 4 to 6 weeks in order to allow for further   healing before we begin range of motion exercises.    ______________________________  MD JUSTINO Horan/AQS  DD:  04/09/2024  Time:  09:12AM  DT:  04/09/2024  Time:  12:14PM  Job #:  [id number]      OPERATIVE REPORT

## 2024-04-09 NOTE — ANESTHESIA PROCEDURE NOTES
Intubation    Date/Time: 4/9/2024 7:14 AM    Performed by: Edita Manriquez CRNA  Authorized by: Luis Daniel Montague Jr., MD    Intubation:     Induction:  Intravenous    Intubated:  Postinduction    Mask Ventilation:  Easy mask    Attempts:  1    Attempted By:  CRNA    Method of Intubation:  Other (see comments)    Difficult Airway Encountered?: No      Complications:  None    Airway Device:  Supraglottic airway/LMA    Airway Device Size:  5.0    Style/Cuff Inflation:  Cuffed (inflated to minimal occlusive pressure)    Placement Verified By:  Capnometry    Complicating Factors:  None    Findings Post-Intubation:  BS equal bilateral and atraumatic/condition of teeth unchanged  Notes:      AirQ not seating well, exchanged for ambu 5.0

## 2024-04-09 NOTE — BRIEF OP NOTE
[facility] - Periop Services  Brief Operative Note    Surgery Date: 4/9/2024     Surgeon(s) and Role:     * Curtis Corona MD - Primary    Assisting Surgeon: None    Pre-op Diagnosis:  Closed displaced comminuted fracture of left patella, nonunion [S82.042N]  Painful orthopaedic hardware [T84.84XA]    Post-op Diagnosis: same    Procedure(s) (LRB):  REVISION ORIF, FRACTURE, PATELLA (Left) nonunion with grafting    Anesthesia: General/Regional    Operative Findings: see op report    Estimated Blood Loss: 10 mL         Specimens:   Specimen (24h ago, onward)      None              Discharge Note    OUTCOME: Patient tolerated treatment/procedure well without complication and is now ready for discharge.    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

## 2024-04-09 NOTE — DISCHARGE INSTRUCTIONS
-NO driving and NO alcohol consumption for 24 hours and while taking narcotic pain medication.    -call for and keep Follow up appointment for 1 week    -Wound care: none, keep clean and dry until follow up appt do not remove, call md office for questions or concerns    -Monitor for signs of INFECTION: redness, swelling, drainage/pus/foul odor, fever, chills. Also, monitor extremity for good circulation (pink, warm, etc)    - Weight bearing status:weight bearing  as tolerated no bending to operative leg    -Apply ICE and ELEVATE extremity as needed to aid in pain and inflammation.    -Report to your nearest ER/Notify your doctor if you experience any SUDDEN/SEVERE chest pain/abdominal pain, weakness, trouble breathing, uncontrolled pain.      BLEEDING: if you experience uncontrollable bleeding, contact your doctor or go to ER    NAUSEA: due to the anesthesia, you may experience nausea for up to 24 hours. If nausea and vomiting last longer, contact your doctor.     INFECTION:  watch for any signs or symptoms of infection such as chills, fever, redness or drainage at surgical site. Notify your doctor     PAIN : take your pain medications as directed. If the pain medications are not helping, notify your doctor.

## 2024-04-09 NOTE — TRANSFER OF CARE
"Anesthesia Transfer of Care Note    Patient: Briseyda Torres    Procedure(s) Performed: Procedure(s) (LRB):  REVISION ORIF, FRACTURE, PATELLA (Left)    Patient location: PACU    Anesthesia Type: general    Transport from OR: Transported from OR on room air with adequate spontaneous ventilation    Post pain: adequate analgesia    Post assessment: no apparent anesthetic complications and tolerated procedure well    Post vital signs: stable    Level of consciousness: responds to stimulation    Nausea/Vomiting: no nausea/vomiting    Complications: none    Transfer of care protocol was followed      Last vitals: Visit Vitals  BP (!) 182/92   Pulse 62   Temp 36.8 °C (98.3 °F) (Oral)   Resp 18   Ht 5' 6" (1.676 m)   Wt 97.9 kg (215 lb 13.3 oz)   SpO2 99%   Breastfeeding No   BMI 34.84 kg/m²     "

## 2024-04-10 VITALS
TEMPERATURE: 98 F | HEART RATE: 87 BPM | WEIGHT: 215.81 LBS | HEIGHT: 66 IN | SYSTOLIC BLOOD PRESSURE: 171 MMHG | OXYGEN SATURATION: 97 % | RESPIRATION RATE: 18 BRPM | BODY MASS INDEX: 34.68 KG/M2 | DIASTOLIC BLOOD PRESSURE: 92 MMHG

## 2024-04-10 NOTE — ANESTHESIA POSTPROCEDURE EVALUATION
Anesthesia Post Evaluation    Patient: Briseyda Torres    Procedure(s) Performed: Procedure(s) (LRB):  REVISION ORIF, FRACTURE, PATELLA (Left)    Final Anesthesia Type: general      Patient location during evaluation: [facility]  Patient participation: Yes- Able to Participate  Level of consciousness: awake and alert  Post-procedure vital signs: reviewed and stable  Pain management: adequate  Airway patency: patent    PONV status at discharge: No PONV  Anesthetic complications: no      Cardiovascular status: blood pressure returned to baseline  Respiratory status: spontaneous ventilation and room air  Hydration status: euvolemic  Follow-up not needed.              Vitals Value Taken Time   /92 04/09/24 1101   Temp 36.9 °C (98.4 °F) 04/09/24 0957   Pulse 87 04/09/24 1115   Resp 18 04/09/24 1115   SpO2 97 % 04/09/24 1115         Event Time   Out of Recovery 09:58:00         Pain/Delma Score: Pain Rating Prior to Med Admin: 8 (4/9/2024 10:24 AM)  Delma Score: 10 (4/9/2024 11:38 AM)  Modified Delma Score: 19 (4/9/2024 11:38 AM)

## 2024-04-11 ENCOUNTER — PATIENT MESSAGE (OUTPATIENT)
Dept: ORTHOPEDICS | Facility: CLINIC | Age: 64
End: 2024-04-11
Payer: MEDICARE

## 2024-04-11 NOTE — PROGRESS NOTES
Patient is capable and willing to operate wheelchair safely.  They will require elevating leg rests due to fracture swelling/edema. Patient is nonweightbearing to  lower extremity, unable to stand for MRADL's, wheelchair required for patient to be more self sufficient in performing adl's safely. Home is wheelchair accessible. Patient's mobility limitations cannot be sufficently resolved by use of other ambulatory therapies.

## 2024-04-12 LAB — BACTERIA SPEC ANAEROBE CULT: NORMAL

## 2024-04-14 LAB — BACTERIA TISS AEROBE CULT: NO GROWTH

## 2024-04-18 ENCOUNTER — OFFICE VISIT (OUTPATIENT)
Dept: ORTHOPEDICS | Facility: CLINIC | Age: 64
End: 2024-04-18
Payer: MEDICARE

## 2024-04-18 VITALS
HEIGHT: 66 IN | HEART RATE: 85 BPM | SYSTOLIC BLOOD PRESSURE: 134 MMHG | BODY MASS INDEX: 34.68 KG/M2 | DIASTOLIC BLOOD PRESSURE: 81 MMHG | WEIGHT: 215.81 LBS

## 2024-04-18 DIAGNOSIS — S82.042K CLOSED DISPLACED COMMINUTED FRACTURE OF LEFT PATELLA WITH NONUNION: Primary | ICD-10-CM

## 2024-04-18 DIAGNOSIS — T84.84XA PAINFUL ORTHOPAEDIC HARDWARE: ICD-10-CM

## 2024-04-18 PROCEDURE — 99024 POSTOP FOLLOW-UP VISIT: CPT | Mod: POP,,,

## 2024-04-18 NOTE — PROGRESS NOTES
[facility]      Name: Briseyda Torres  : 1960  MRN: [id number]  Date: 2024    Chief Complaint   Patient presents with    Post-op Evaluation     1 wk s/p, ORIF L PATELLA 24, having constant and severe pain, having some swelling, presents in wheelchair, wearing knee immobilizer, has no  other complaints at this time,        Subjective:       Patient ID: Briseyda Torres is a 63 y.o. female.    Chief Complaint   Patient presents with    Post-op Evaluation     1 wk s/p, ORIF L PATELLA 24, having constant and severe pain, having some swelling, presents in wheelchair, wearing knee immobilizer, has no  other complaints at this time,         HPI  63 y.o. female with PMHx left patella nonunion with ORIF presents to clinic for 1 week post op appointment and wound check.  States she has home health coming to her home.  Has been compliant with knee immobilizer.      She has had multiple operations on her left knee and has Hx of wound dehiscence and fracture nonunion.     ROS:  Constitutional: Denies fever chills  MSK: Pain evident at site of injury located in HPI,   Integ: No signs of abrasions or lacerations  Neuro: No numbness or tingling  Lymphatic: No swelling outside the area of injury     Current Outpatient Medications   Medication Instructions    ALPRAZolam (XANAX) 1 mg, Oral, Nightly    ELIQUIS 5 mg, Oral, 2 times daily    EPINEPHrine (EPIPEN) 0.3 mg/0.3 mL AtIn ADMINISTER 1 PEN INJECTOR IN THE MUSCLE AS NEEDED FOR ANAPHYLAXIS    EScitalopram oxalate (LEXAPRO) 20 mg, Oral, Nightly    estradioL (ESTRACE) 1 mg, Oral, Daily    ezetimibe (ZETIA) 10 mg, Oral, Daily    hydrOXYzine (ATARAX) 50 mg, Oral, 3 times daily PRN    levothyroxine (SYNTHROID) 100 mcg, Oral, Daily    liothyronine (CYTOMEL) 5 mcg, Oral, Every morning    losartan (COZAAR) 50 mg, Oral, Nightly    methocarbamoL (ROBAXIN) 500 mg, Oral, 3 times daily    mv-min/iron/folic/calcium/vitK (WOMEN'S 50 PLUS  "MULTIVIT-IRON ORAL) 1 tablet, Oral, Daily    promethazine (PHENERGAN) 25 mg, Oral, Every 6 hours PRN    rosuvastatin (CRESTOR) 40 mg, Oral, Daily    sumatriptan (IMITREX) 25 MG Tab 1 tablet, Oral, Daily PRN          Objective:      Visit Vitals  /81   Pulse 85   Ht 5' 6" (1.676 m)   Wt 97.9 kg (215 lb 13.3 oz)   BMI 34.84 kg/m²     Physical Exam    General the patient is alert and oriented x3 no acute distress nontoxic-appearing appropriate affect.    Constitutional: Vital signs are examined and stable.  Resp: No signs of labored breathing             LLE: -Skin: Wound vac present and removed.  Incision well approximated without evidence of infection or dehiscence.  Staples present.            -MSK: Able to perform SLR in seated position.  Knee remained in extension for exam.  AROM of digits appreciated.             -Neuro:  Sensation intact to light touch L3-S1 dermatomes           -Lymphatic: Nonpitting edema noted just proximal to ankle           -CV: Capillary refill is less than 2 seconds. Palpable DP pulse. Compartments soft and compressible                        Body mass index is 34.84 kg/m².  Ideal body weight: 59.3 kg (130 lb 11.7 oz)  Adjusted ideal body weight: 74.7 kg (164 lb 12.3 oz)  Hgb   Date Value Ref Range Status   04/05/2024 12.9 12.0 - 16.0 g/dL Final   11/27/2023 10.7 (L) 12.0 - 16.0 g/dL Final     Hct   Date Value Ref Range Status   04/05/2024 40.3 37.0 - 47.0 % Final   11/27/2023 34.3 (L) 37.0 - 47.0 % Final     No results found for: "FMVPESMK29PN"  WBC   Date Value Ref Range Status   04/05/2024 7.12 4.50 - 11.50 x10(3)/mcL Final   11/27/2023 4.75 4.50 - 11.50 x10(3)/mcL Final           Assessment:           ICD-10-CM ICD-9-CM   1. 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 Painful orthopaedic hardware  T84.84XA 996.78           Plan:         No follow-ups on file.    1. Closed displaced comminuted fracture of left patella with " nonunion    2. Painful orthopaedic hardware      Provena wound vac removed today.  Incision is well approximated without evidence of infection.  Pt has been wearing knee immobilizer as instructed.  Switched to hinge knee brace today.  F/u in 1 week for staple removal.  May weight bear as tolerated in full extension of right knee to RLE.  No ROM of right knee at this time.     Pt verbalizes understanding and agrees with tx plan. Pt to call clinic with any questions/concerns.      The above findings, diagnostics, and treatment plan were discussed with Dr. Corona who is in agreement with the plan of care except as stated in additional documentation.     Ashley Gunther, PA-C [facility]        Future Appointments   Date Time Provider Department Center   4/24/2024  1:00 PM Curtis Corona MD [facility] [place]

## 2024-04-21 ENCOUNTER — PATIENT MESSAGE (OUTPATIENT)
Dept: ORTHOPEDICS | Facility: CLINIC | Age: 64
End: 2024-04-21
Payer: MEDICARE

## 2024-04-24 ENCOUNTER — PATIENT MESSAGE (OUTPATIENT)
Dept: ADMINISTRATIVE | Facility: OTHER | Age: 64
End: 2024-04-24
Payer: MEDICARE

## 2024-04-24 ENCOUNTER — OFFICE VISIT (OUTPATIENT)
Dept: ORTHOPEDICS | Facility: CLINIC | Age: 64
End: 2024-04-24
Payer: MEDICARE

## 2024-04-24 ENCOUNTER — HOSPITAL ENCOUNTER (OUTPATIENT)
Dept: RADIOLOGY | Facility: CLINIC | Age: 64
Discharge: HOME | End: 2024-04-24
Attending: ORTHOPAEDIC SURGERY
Payer: MEDICARE

## 2024-04-24 VITALS
WEIGHT: 213.88 LBS | SYSTOLIC BLOOD PRESSURE: 135 MMHG | HEIGHT: 66 IN | BODY MASS INDEX: 34.37 KG/M2 | HEART RATE: 80 BPM | DIASTOLIC BLOOD PRESSURE: 79 MMHG

## 2024-04-24 DIAGNOSIS — S82.042K CLOSED DISPLACED COMMINUTED FRACTURE OF LEFT PATELLA WITH NONUNION: ICD-10-CM

## 2024-04-24 DIAGNOSIS — S82.042K CLOSED DISPLACED COMMINUTED FRACTURE OF LEFT PATELLA WITH NONUNION: Primary | ICD-10-CM

## 2024-04-24 PROCEDURE — 73560 X-RAY EXAM OF KNEE 1 OR 2: CPT | Mod: LT,,, | Performed by: ORTHOPAEDIC SURGERY

## 2024-04-24 PROCEDURE — 99213 OFFICE O/P EST LOW 20 MIN: CPT | Mod: 57,,, | Performed by: ORTHOPAEDIC SURGERY

## 2024-04-24 RX ORDER — OXYCODONE AND ACETAMINOPHEN 10; 325 MG/1; MG/1
1 TABLET ORAL EVERY 6 HOURS PRN
Qty: 28 TABLET | Refills: 0 | Status: SHIPPED | OUTPATIENT
Start: 2024-04-24 | End: 2024-05-07 | Stop reason: SDUPTHER

## 2024-04-24 RX ORDER — MUPIROCIN 20 MG/G
OINTMENT TOPICAL
Status: CANCELLED | OUTPATIENT
Start: 2024-04-24

## 2024-04-24 RX ORDER — ENOXAPARIN SODIUM 300 MG/3ML
40 INJECTION INTRAVENOUS; SUBCUTANEOUS
Status: SHIPPED | OUTPATIENT
Start: 2024-04-24

## 2024-04-24 RX ORDER — METHOCARBAMOL 500 MG/1
500 TABLET, FILM COATED ORAL 3 TIMES DAILY
Qty: 42 TABLET | Refills: 0 | Status: SHIPPED | OUTPATIENT
Start: 2024-04-24 | End: 2024-05-08

## 2024-04-24 NOTE — H&P (VIEW-ONLY)
Subjective:       Patient ID: Briseyda Torres is a 63 y.o. female.    Chief Complaint   Patient presents with    Left Knee - Follow-up     2 week f/u from ORIF left patella fx. Reports fall Sunday. Reports increase pain and discomfort.         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  Unfortunately she had a fall at home in the bathroom over the weekend.  She fell onto her left side and has some pain in her shoulder but also in her left knee.  She was very concerned that she may have caused damage to our fixation.  She has been trying to wear the brace she states that it does not fit very well and keeps sliding off.  She continues to have pain in her right foot as well has a history of plantar fasciitis on that side.    Follow-up  Pertinent negatives include no abdominal pain, chest pain, chills, congestion, coughing, fever, nausea, neck pain, numbness or vomiting.       Review of Systems   Constitutional: Negative for chills, fever and malaise/fatigue.   HENT:  Negative for congestion and hearing loss.    Eyes:  Negative for visual disturbance.   Cardiovascular:  Negative for chest pain and syncope.   Respiratory:  Negative for cough and shortness of breath.    Hematologic/Lymphatic: Does not bruise/bleed easily.   Skin:  Negative for color change and suspicious lesions.   Musculoskeletal:  Negative for falls and neck pain.   Gastrointestinal:  Negative for abdominal pain, nausea and vomiting.   Genitourinary:  Negative for dysuria and hematuria.   Neurological:  Negative for numbness and sensory change.   Psychiatric/Behavioral:  Negative for altered mental status. The patient is not nervous/anxious.         Current Outpatient Medications on File Prior to Visit   Medication Sig Dispense Refill    ALPRAZolam (XANAX) 1 MG tablet Take 1 mg by mouth every evening.      ELIQUIS 5 mg Tab Take 5 mg by mouth 2 (two) times  "daily.      EPINEPHrine (EPIPEN) 0.3 mg/0.3 mL AtIn ADMINISTER 1 PEN INJECTOR IN THE MUSCLE AS NEEDED FOR ANAPHYLAXIS      EScitalopram oxalate (LEXAPRO) 10 MG tablet Take 20 mg by mouth every evening.      estradioL (ESTRACE) 1 MG tablet Take 1 mg by mouth once daily.      ezetimibe (ZETIA) 10 mg tablet Take 10 mg by mouth once daily.      hydrOXYzine (ATARAX) 50 MG tablet Take 50 mg by mouth 3 (three) times daily as needed.      levothyroxine (SYNTHROID) 100 MCG tablet Take 100 mcg by mouth once daily.      liothyronine (CYTOMEL) 5 MCG Tab Take 5 mcg by mouth every morning.      losartan (COZAAR) 50 MG tablet Take 50 mg by mouth every evening.      promethazine (PHENERGAN) 25 MG tablet Take 25 mg by mouth every 6 (six) hours as needed for Nausea.      rosuvastatin (CRESTOR) 40 MG Tab Take 40 mg by mouth once daily.      sumatriptan (IMITREX) 25 MG Tab Take 1 tablet by mouth daily as needed for Migraine.      mv-min/iron/folic/calcium/vitK (WOMEN'S 50 PLUS MULTIVIT-IRON ORAL) Take 1 tablet by mouth once daily.      [DISCONTINUED] methocarbamoL (ROBAXIN) 500 MG Tab Take 1 tablet (500 mg total) by mouth 3 (three) times daily. for 10 days 30 tablet 0    [DISCONTINUED] oxyCODONE-acetaminophen (PERCOCET)  mg per tablet Take 1 tablet by mouth every 4 (four) hours as needed for Pain. 42 tablet 0     No current facility-administered medications on file prior to visit.          Objective:      /79   Pulse 80   Ht 5' 6" (1.676 m)   Wt 97 kg (213 lb 13.5 oz)   BMI 34.52 kg/m²   Physical Exam  Constitutional:       General: She is not in acute distress.     Appearance: Normal appearance. She is not ill-appearing.   HENT:      Head: Normocephalic and atraumatic.      Nose: No congestion.   Eyes:      Extraocular Movements: Extraocular movements intact.   Cardiovascular:      Rate and Rhythm: Normal rate and regular rhythm.      Pulses: Normal pulses.   Pulmonary:      Effort: Pulmonary effort is normal.      " Breath sounds: Normal breath sounds.   Abdominal:      General: There is no distension.      Palpations: Abdomen is soft.      Tenderness: There is no abdominal tenderness.   Musculoskeletal:      Comments: Left lower extremity:  Surgical incision is well healed, staples in place.  No erythema or drainage.  Swelling noted around the knee.  No calf swelling or tenderness.  Good range motion of the ankle and digits distally.  No attempted range motion of the knee performed.    Skin:     General: Skin is warm and dry.   Neurological:      Mental Status: She is alert and oriented to person, place, and time. Mental status is at baseline.   Psychiatric:         Mood and Affect: Mood normal.         Behavior: Behavior normal.         Thought Content: Thought content normal.         Judgment: Judgment normal.        Body mass index is 34.52 kg/m².    Radiology:   Left knee two views:  Fracture has displaced in his  compared to previous films she has had hardware failure with what appears to be a fracture through the proximal pole.      Assessment:         1. 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  She has had 2 attempts at internal fixation of her patella fracture and now has displaced again.  Unfortunately I do not think that we are going to be able to restore her articular congruency and repair her patella fracture, at this point her best course would be to return to the operating room for removal of the implants that are now displaced and prominent and perform a partial patellectomy with removal of the comminuted proximal pole perform a suture repair of her quad tendon down to the inferior pole of the patella.  In order to protect our repair this time we will plan to apply a knee spanning  external fixator that will remain in place 4-6 weeks.  She is notably upset about the situation, she understands that the more operative visits she has for the knee the more likely she is to develop a postoperative infection and this can be a devastating problem and may end up eventually leading to permanent loss of function of the knee versus and amputation.  Plan to take her to the operating room on Friday.  The risks and benefits and alternatives of treatment were again discussed with the patient she and her  understand and agree with all that we have discussed.  She will remain nonweightbearing no range motion of the left knee.  Plan to take her staples out in the operating room.  All questions and concerns were addressed today.      This note/OR report was created with the assistance of  voice recognition software or phone  dictation.  There may be transcription errors as a result of using this technology however minimal. Effort has been made to assure accuracy of transcription but any obvious errors or omissions should be clarified with the author of the document.       Curtsi Corona MD  Orthopedic Trauma  [facility]      No follow-ups on file.    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  -     X-Ray Knee 1 or 2 View Left; Future; Expected date: 04/24/2024  -     Case Request Operating Room: ORIF, FRACTURE, PATELLA WITH EX_FIX APPLICATION    Other orders  -     Place in Outpatient; Standing  -     Vital signs; Standing  -     Insert peripheral IV; Standing  -     Verify informed consent; Standing  -     Diet NPO; Standing  -     ceFAZolin (ANCEF) 2 g in dextrose 5 % (D5W) 50 mL IVPB  -     mupirocin 2 % ointment  -     Full code; Standing  -     enoxaparin injection 40 mg         Medications Ordered This Encounter   Medications    enoxaparin injection 40 mg       Orders Placed This Encounter   Procedures    X-Ray Knee 1 or 2 View Left     Standing Status:    Future     Number of Occurrences:   1     Standing Expiration Date:   4/24/2025     Order Specific Question:   May the Radiologist modify the order per protocol to meet the clinical needs of the patient?     Answer:   Yes     Order Specific Question:   Release to patient     Answer:   Immediate    Diet NPO     Specify start time     Standing Status:   Standing     Number of Occurrences:   1    Verify informed consent     Standing Status:   Standing     Number of Occurrences:   1    Insert peripheral IV     Standing Status:   Standing     Number of Occurrences:   1    Case Request Operating Room: ORIF, FRACTURE, PATELLA WITH EX_FIX APPLICATION     Order Specific Question:   Medical Necessity:     Answer:   Medically Urgent [101]     Order Specific Question:   CPT Code:     Answer:   DC REVISION/REMOVAL OF KNEECAP [55775]     Order Specific Question:   CPT Code:     Answer:   DC FIX QUAD/HAMSTR MUSC RUPT,SECOND [22313]     Order Specific Question:   CPT Code:     Answer:   DC APPLY BONE UNIPLANE,EXT FIX DEV [13244]     Order Specific Question:   Implant Required:     Answer:   Yes [1000]     Order Specific Question:   Has Vendor been notified:     Answer:   Yes [100]     Comments:   [employer]     Order Specific Question:   Is an on-site pathologist required for this procedure?     Answer:   N/A       No future appointments.

## 2024-04-24 NOTE — PROGRESS NOTES
Subjective:       Patient ID: Briseyda Torres is a 63 y.o. female.    Chief Complaint   Patient presents with    Left Knee - Follow-up     2 week f/u from ORIF left patella fx. Reports fall Sunday. Reports increase pain and discomfort.         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  Unfortunately she had a fall at home in the bathroom over the weekend.  She fell onto her left side and has some pain in her shoulder but also in her left knee.  She was very concerned that she may have caused damage to our fixation.  She has been trying to wear the brace she states that it does not fit very well and keeps sliding off.  She continues to have pain in her right foot as well has a history of plantar fasciitis on that side.    Follow-up  Pertinent negatives include no abdominal pain, chest pain, chills, congestion, coughing, fever, nausea, neck pain, numbness or vomiting.       Review of Systems   Constitutional: Negative for chills, fever and malaise/fatigue.   HENT:  Negative for congestion and hearing loss.    Eyes:  Negative for visual disturbance.   Cardiovascular:  Negative for chest pain and syncope.   Respiratory:  Negative for cough and shortness of breath.    Hematologic/Lymphatic: Does not bruise/bleed easily.   Skin:  Negative for color change and suspicious lesions.   Musculoskeletal:  Negative for falls and neck pain.   Gastrointestinal:  Negative for abdominal pain, nausea and vomiting.   Genitourinary:  Negative for dysuria and hematuria.   Neurological:  Negative for numbness and sensory change.   Psychiatric/Behavioral:  Negative for altered mental status. The patient is not nervous/anxious.         Current Outpatient Medications on File Prior to Visit   Medication Sig Dispense Refill    ALPRAZolam (XANAX) 1 MG tablet Take 1 mg by mouth every evening.      ELIQUIS 5 mg Tab Take 5 mg by mouth 2 (two) times  "daily.      EPINEPHrine (EPIPEN) 0.3 mg/0.3 mL AtIn ADMINISTER 1 PEN INJECTOR IN THE MUSCLE AS NEEDED FOR ANAPHYLAXIS      EScitalopram oxalate (LEXAPRO) 10 MG tablet Take 20 mg by mouth every evening.      estradioL (ESTRACE) 1 MG tablet Take 1 mg by mouth once daily.      ezetimibe (ZETIA) 10 mg tablet Take 10 mg by mouth once daily.      hydrOXYzine (ATARAX) 50 MG tablet Take 50 mg by mouth 3 (three) times daily as needed.      levothyroxine (SYNTHROID) 100 MCG tablet Take 100 mcg by mouth once daily.      liothyronine (CYTOMEL) 5 MCG Tab Take 5 mcg by mouth every morning.      losartan (COZAAR) 50 MG tablet Take 50 mg by mouth every evening.      promethazine (PHENERGAN) 25 MG tablet Take 25 mg by mouth every 6 (six) hours as needed for Nausea.      rosuvastatin (CRESTOR) 40 MG Tab Take 40 mg by mouth once daily.      sumatriptan (IMITREX) 25 MG Tab Take 1 tablet by mouth daily as needed for Migraine.      mv-min/iron/folic/calcium/vitK (WOMEN'S 50 PLUS MULTIVIT-IRON ORAL) Take 1 tablet by mouth once daily.      [DISCONTINUED] methocarbamoL (ROBAXIN) 500 MG Tab Take 1 tablet (500 mg total) by mouth 3 (three) times daily. for 10 days 30 tablet 0    [DISCONTINUED] oxyCODONE-acetaminophen (PERCOCET)  mg per tablet Take 1 tablet by mouth every 4 (four) hours as needed for Pain. 42 tablet 0     No current facility-administered medications on file prior to visit.          Objective:      /79   Pulse 80   Ht 5' 6" (1.676 m)   Wt 97 kg (213 lb 13.5 oz)   BMI 34.52 kg/m²   Physical Exam  Constitutional:       General: She is not in acute distress.     Appearance: Normal appearance. She is not ill-appearing.   HENT:      Head: Normocephalic and atraumatic.      Nose: No congestion.   Eyes:      Extraocular Movements: Extraocular movements intact.   Cardiovascular:      Rate and Rhythm: Normal rate and regular rhythm.      Pulses: Normal pulses.   Pulmonary:      Effort: Pulmonary effort is normal.      " Breath sounds: Normal breath sounds.   Abdominal:      General: There is no distension.      Palpations: Abdomen is soft.      Tenderness: There is no abdominal tenderness.   Musculoskeletal:      Comments: Left lower extremity:  Surgical incision is well healed, staples in place.  No erythema or drainage.  Swelling noted around the knee.  No calf swelling or tenderness.  Good range motion of the ankle and digits distally.  No attempted range motion of the knee performed.    Skin:     General: Skin is warm and dry.   Neurological:      Mental Status: She is alert and oriented to person, place, and time. Mental status is at baseline.   Psychiatric:         Mood and Affect: Mood normal.         Behavior: Behavior normal.         Thought Content: Thought content normal.         Judgment: Judgment normal.        Body mass index is 34.52 kg/m².    Radiology:   Left knee two views:  Fracture has displaced in his  compared to previous films she has had hardware failure with what appears to be a fracture through the proximal pole.      Assessment:         1. 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  She has had 2 attempts at internal fixation of her patella fracture and now has displaced again.  Unfortunately I do not think that we are going to be able to restore her articular congruency and repair her patella fracture, at this point her best course would be to return to the operating room for removal of the implants that are now displaced and prominent and perform a partial patellectomy with removal of the comminuted proximal pole perform a suture repair of her quad tendon down to the inferior pole of the patella.  In order to protect our repair this time we will plan to apply a knee spanning  external fixator that will remain in place 4-6 weeks.  She is notably upset about the situation, she understands that the more operative visits she has for the knee the more likely she is to develop a postoperative infection and this can be a devastating problem and may end up eventually leading to permanent loss of function of the knee versus and amputation.  Plan to take her to the operating room on Friday.  The risks and benefits and alternatives of treatment were again discussed with the patient she and her  understand and agree with all that we have discussed.  She will remain nonweightbearing no range motion of the left knee.  Plan to take her staples out in the operating room.  All questions and concerns were addressed today.      This note/OR report was created with the assistance of  voice recognition software or phone  dictation.  There may be transcription errors as a result of using this technology however minimal. Effort has been made to assure accuracy of transcription but any obvious errors or omissions should be clarified with the author of the document.       Curtis Corona MD  Orthopedic Trauma  [facility]      No follow-ups on file.    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  -     X-Ray Knee 1 or 2 View Left; Future; Expected date: 04/24/2024  -     Case Request Operating Room: ORIF, FRACTURE, PATELLA WITH EX_FIX APPLICATION    Other orders  -     Place in Outpatient; Standing  -     Vital signs; Standing  -     Insert peripheral IV; Standing  -     Verify informed consent; Standing  -     Diet NPO; Standing  -     ceFAZolin (ANCEF) 2 g in dextrose 5 % (D5W) 50 mL IVPB  -     mupirocin 2 % ointment  -     Full code; Standing  -     enoxaparin injection 40 mg         Medications Ordered This Encounter   Medications    enoxaparin injection 40 mg       Orders Placed This Encounter   Procedures    X-Ray Knee 1 or 2 View Left     Standing Status:    Future     Number of Occurrences:   1     Standing Expiration Date:   4/24/2025     Order Specific Question:   May the Radiologist modify the order per protocol to meet the clinical needs of the patient?     Answer:   Yes     Order Specific Question:   Release to patient     Answer:   Immediate    Diet NPO     Specify start time     Standing Status:   Standing     Number of Occurrences:   1    Verify informed consent     Standing Status:   Standing     Number of Occurrences:   1    Insert peripheral IV     Standing Status:   Standing     Number of Occurrences:   1    Case Request Operating Room: ORIF, FRACTURE, PATELLA WITH EX_FIX APPLICATION     Order Specific Question:   Medical Necessity:     Answer:   Medically Urgent [101]     Order Specific Question:   CPT Code:     Answer:   NH REVISION/REMOVAL OF KNEECAP [19961]     Order Specific Question:   CPT Code:     Answer:   NH FIX QUAD/HAMSTR MUSC RUPT,SECOND [26043]     Order Specific Question:   CPT Code:     Answer:   NH APPLY BONE UNIPLANE,EXT FIX DEV [11657]     Order Specific Question:   Implant Required:     Answer:   Yes [1000]     Order Specific Question:   Has Vendor been notified:     Answer:   Yes [100]     Comments:   synthes     Order Specific Question:   Is an on-site pathologist required for this procedure?     Answer:   N/A       No future appointments.

## 2024-04-25 ENCOUNTER — PATIENT MESSAGE (OUTPATIENT)
Dept: ADMINISTRATIVE | Facility: OTHER | Age: 64
End: 2024-04-25
Payer: MEDICARE

## 2024-04-25 NOTE — PRE-PROCEDURE INSTRUCTIONS
"[facility]: Outpatient Surgery  Preprocedure Check-In Instructions     Your arrival time for your surgery or procedure is 0500______.  We ask patients to arrive about 2 hours before surgery to allow for enough time to review your health history & medications, start your IV, complete any outstanding labwork or tests, and meet your Anesthesiologist.    Expectations: "Because of inconsistent procedure completion times, an unexpected wait may occur. The Physicians would like you to be here to prepare in the event they run ahead of time. We will make you as comfortable as possible and keep you informed. We apologize in advance if this happens."    You will arrive at [facility], [address], [place].  Enter through the [place] entrance next to the Emergency Room, and come to the 6th floor to the Outpatient Surgery Department.     Visitory Policy:  You are allowed 2 adult visitors to be with you in the hospital. All hospital visitors should be in good current health.  No small children.     What to Bring:  Please have your ID, insurance cards, and all home medication bottles with you at check in.  Bring your CPAP machine if one is used at home.     Fasting:  Nothing to eat or drink after midnight the night before your procedure. This includes no ice, gum, hard candies, and/or tobacco products.  Follow your doctor's instructions for taking any medications on the morning of your procedure.  If no instructions for taking medications were given, do not take any medications but bring your medications in their bottles to your procedure check in.     Follow your doctor's preoperative instructions regarding skin prep, bowel prep, bathing, or showering prior to your procedure.  If any special soaps were provided to you, please use according to your doctor's instructions. If no instructions were given from your doctor, take a good bath or shower with antibacterial soap the night " before and the morning of your procedure.  On the morning of procedure, wear loose, comfortable clothing.  No lotions, makeup, perfumes, colognes, deodorant, or jewelry to your procedure.  Removable items (glasses, contact lenses, dentures, retainers, hearing aids) need to be removed for your procedure.  Bring your storage containers for these items if you wear them.     Artificial nails, body jewelry, eyelash extensions, and/or hair extensions with metal clips are not allowed during your surgery.  If you currently wear any of these items, please arrange for them to be removed prior to your arrival to the hospital.     Outpatient or Same Day Surgeries:  Any patients receiving sedation/anesthesia are advised not to drive for 24 hours after their procedure.  We do not allow patients to drive themselves home after discharge.  If you are going home after your procedure, please have someone available to drive you home from the hospital.        You may call the Outpatient Surgery Department at [phone] with any questions or concerns.  We are looking forward to meeting you and taking great care of you for your procedure.  Thank you for choosing [facility] for your surgical needs.

## 2024-04-26 ENCOUNTER — ANESTHESIA EVENT (OUTPATIENT)
Dept: SURGERY | Facility: HOSPITAL | Age: 64
DRG: 482 | End: 2024-04-26
Payer: MEDICARE

## 2024-04-26 ENCOUNTER — HOSPITAL ENCOUNTER (INPATIENT)
Facility: HOSPITAL | Age: 64
LOS: 1 days | Discharge: HOME HEALTH | DRG: 482 | End: 2024-04-29
Attending: ORTHOPAEDIC SURGERY | Admitting: ORTHOPAEDIC SURGERY
Payer: MEDICARE

## 2024-04-26 ENCOUNTER — ANESTHESIA (OUTPATIENT)
Dept: SURGERY | Facility: HOSPITAL | Age: 64
DRG: 482 | End: 2024-04-26
Payer: MEDICARE

## 2024-04-26 DIAGNOSIS — S82.042K CLOSED DISPLACED COMMINUTED FRACTURE OF LEFT PATELLA WITH NONUNION: Primary | ICD-10-CM

## 2024-04-26 PROCEDURE — 37000008 HC ANESTHESIA 1ST 15 MINUTES: Performed by: ORTHOPAEDIC SURGERY

## 2024-04-26 PROCEDURE — 63600175 PHARM REV CODE 636 W HCPCS: Performed by: ORTHOPAEDIC SURGERY

## 2024-04-26 PROCEDURE — C1713 ANCHOR/SCREW BN/BN,TIS/BN: HCPCS | Performed by: ORTHOPAEDIC SURGERY

## 2024-04-26 PROCEDURE — 63600175 PHARM REV CODE 636 W HCPCS: Performed by: NURSE ANESTHETIST, CERTIFIED REGISTERED

## 2024-04-26 PROCEDURE — 71000015 HC POSTOP RECOV 1ST HR: Performed by: ORTHOPAEDIC SURGERY

## 2024-04-26 PROCEDURE — 20690 APPL UNIPLN UNI EXT FIXJ SYS: CPT | Mod: 79,51,, | Performed by: ORTHOPAEDIC SURGERY

## 2024-04-26 PROCEDURE — 63600175 PHARM REV CODE 636 W HCPCS

## 2024-04-26 PROCEDURE — 25000003 PHARM REV CODE 250: Performed by: ORTHOPAEDIC SURGERY

## 2024-04-26 PROCEDURE — 71000016 HC POSTOP RECOV ADDL HR: Performed by: ORTHOPAEDIC SURGERY

## 2024-04-26 PROCEDURE — 27524 TREAT KNEECAP FRACTURE: CPT | Mod: 79,LT,, | Performed by: ORTHOPAEDIC SURGERY

## 2024-04-26 PROCEDURE — 0QBF0ZZ EXCISION OF LEFT PATELLA, OPEN APPROACH: ICD-10-PCS | Performed by: ORTHOPAEDIC SURGERY

## 2024-04-26 PROCEDURE — D9220A PRA ANESTHESIA: Mod: ANES,,, | Performed by: ANESTHESIOLOGY

## 2024-04-26 PROCEDURE — 96372 THER/PROPH/DIAG INJ SC/IM: CPT

## 2024-04-26 PROCEDURE — 71000039 HC RECOVERY, EACH ADD'L HOUR: Performed by: ORTHOPAEDIC SURGERY

## 2024-04-26 PROCEDURE — 37000009 HC ANESTHESIA EA ADD 15 MINS: Performed by: ORTHOPAEDIC SURGERY

## 2024-04-26 PROCEDURE — 63600175 PHARM REV CODE 636 W HCPCS: Performed by: ANESTHESIOLOGY

## 2024-04-26 PROCEDURE — 0LQM0ZZ REPAIR LEFT UPPER LEG TENDON, OPEN APPROACH: ICD-10-PCS | Performed by: ORTHOPAEDIC SURGERY

## 2024-04-26 PROCEDURE — G0378 HOSPITAL OBSERVATION PER HR: HCPCS

## 2024-04-26 PROCEDURE — 36000711: Performed by: ORTHOPAEDIC SURGERY

## 2024-04-26 PROCEDURE — 0QHC3BZ INSERTION OF MONOPLANAR EXTERNAL FIXATION DEVICE INTO LEFT LOWER FEMUR, PERCUTANEOUS APPROACH: ICD-10-PCS | Performed by: ORTHOPAEDIC SURGERY

## 2024-04-26 PROCEDURE — 27201423 OPTIME MED/SURG SUP & DEVICES STERILE SUPPLY: Performed by: ORTHOPAEDIC SURGERY

## 2024-04-26 PROCEDURE — 64447 NJX AA&/STRD FEMORAL NRV IMG: CPT | Performed by: ANESTHESIOLOGY

## 2024-04-26 PROCEDURE — 25000003 PHARM REV CODE 250

## 2024-04-26 PROCEDURE — 71000033 HC RECOVERY, INTIAL HOUR: Performed by: ORTHOPAEDIC SURGERY

## 2024-04-26 PROCEDURE — 25000003 PHARM REV CODE 250: Performed by: NURSE ANESTHETIST, CERTIFIED REGISTERED

## 2024-04-26 PROCEDURE — 0SPD04Z REMOVAL OF INTERNAL FIXATION DEVICE FROM LEFT KNEE JOINT, OPEN APPROACH: ICD-10-PCS | Performed by: ORTHOPAEDIC SURGERY

## 2024-04-26 PROCEDURE — 36000710: Performed by: ORTHOPAEDIC SURGERY

## 2024-04-26 PROCEDURE — D9220A PRA ANESTHESIA: Mod: CRNA,,, | Performed by: NURSE ANESTHETIST, CERTIFIED REGISTERED

## 2024-04-26 PROCEDURE — 27524 TREAT KNEECAP FRACTURE: CPT | Mod: AS,79,LT,

## 2024-04-26 DEVICE — ROD CARBON FIBER 11MM X 400MM: Type: IMPLANTABLE DEVICE | Site: LEG | Status: FUNCTIONAL

## 2024-04-26 DEVICE — CLAMP LG 6 POSITION MULTI-PIN: Type: IMPLANTABLE DEVICE | Site: LEG | Status: FUNCTIONAL

## 2024-04-26 DEVICE — POST FIXATOR EXTERAL 11MM SS: Type: IMPLANTABLE DEVICE | Site: LEG | Status: FUNCTIONAL

## 2024-04-26 DEVICE — CLAMP COMBINATION / LG EXT FIX: Type: IMPLANTABLE DEVICE | Site: LEG | Status: FUNCTIONAL

## 2024-04-26 DEVICE — SCREW SCHANZ 60MM 5X175: Type: IMPLANTABLE DEVICE | Site: LEG | Status: FUNCTIONAL

## 2024-04-26 DEVICE — IMPLANTABLE DEVICE: Type: IMPLANTABLE DEVICE | Site: LEG | Status: FUNCTIONAL

## 2024-04-26 RX ORDER — METHOCARBAMOL 500 MG/1
500 TABLET, FILM COATED ORAL 3 TIMES DAILY
Status: DISCONTINUED | OUTPATIENT
Start: 2024-04-26 | End: 2024-04-26

## 2024-04-26 RX ORDER — CEFAZOLIN SODIUM 2 G/50ML
2 SOLUTION INTRAVENOUS
Status: COMPLETED | OUTPATIENT
Start: 2024-04-26 | End: 2024-04-26

## 2024-04-26 RX ORDER — LABETALOL HYDROCHLORIDE 5 MG/ML
INJECTION, SOLUTION INTRAVENOUS
Status: DISPENSED
Start: 2024-04-26 | End: 2024-04-26

## 2024-04-26 RX ORDER — ACETAMINOPHEN 325 MG/1
650 TABLET ORAL EVERY 8 HOURS
Status: DISCONTINUED | OUTPATIENT
Start: 2024-04-26 | End: 2024-04-27

## 2024-04-26 RX ORDER — MUPIROCIN 20 MG/G
OINTMENT TOPICAL
Status: DISCONTINUED | OUTPATIENT
Start: 2024-04-26 | End: 2024-04-26 | Stop reason: HOSPADM

## 2024-04-26 RX ORDER — OXYCODONE HYDROCHLORIDE 10 MG/1
10 TABLET ORAL EVERY 4 HOURS PRN
Status: DISCONTINUED | OUTPATIENT
Start: 2024-04-26 | End: 2024-04-27

## 2024-04-26 RX ORDER — HYDROMORPHONE HYDROCHLORIDE 2 MG/ML
0.2 INJECTION, SOLUTION INTRAMUSCULAR; INTRAVENOUS; SUBCUTANEOUS EVERY 5 MIN PRN
Status: DISCONTINUED | OUTPATIENT
Start: 2024-04-26 | End: 2024-04-26 | Stop reason: HOSPADM

## 2024-04-26 RX ORDER — ENOXAPARIN SODIUM 100 MG/ML
40 INJECTION SUBCUTANEOUS EVERY 24 HOURS
Status: DISCONTINUED | OUTPATIENT
Start: 2024-04-26 | End: 2024-04-29 | Stop reason: HOSPADM

## 2024-04-26 RX ORDER — MEPERIDINE HYDROCHLORIDE 25 MG/ML
12.5 INJECTION INTRAMUSCULAR; INTRAVENOUS; SUBCUTANEOUS ONCE
Status: COMPLETED | OUTPATIENT
Start: 2024-04-26 | End: 2024-04-26

## 2024-04-26 RX ORDER — CEFAZOLIN SODIUM 2 G/50ML
2 SOLUTION INTRAVENOUS
Qty: 150 ML | Refills: 0 | Status: COMPLETED | OUTPATIENT
Start: 2024-04-26 | End: 2024-04-27

## 2024-04-26 RX ORDER — DEXAMETHASONE SODIUM PHOSPHATE 4 MG/ML
INJECTION, SOLUTION INTRA-ARTICULAR; INTRALESIONAL; INTRAMUSCULAR; INTRAVENOUS; SOFT TISSUE
Status: DISCONTINUED | OUTPATIENT
Start: 2024-04-26 | End: 2024-04-26

## 2024-04-26 RX ORDER — PHENYLEPHRINE HYDROCHLORIDE 10 MG/ML
INJECTION INTRAVENOUS
Status: DISCONTINUED | OUTPATIENT
Start: 2024-04-26 | End: 2024-04-26

## 2024-04-26 RX ORDER — ROPIVACAINE HYDROCHLORIDE 5 MG/ML
30 INJECTION, SOLUTION EPIDURAL; INFILTRATION; PERINEURAL ONCE
Status: DISCONTINUED | OUTPATIENT
Start: 2024-04-26 | End: 2024-04-26

## 2024-04-26 RX ORDER — MIDAZOLAM HYDROCHLORIDE 2 MG/2ML
2 INJECTION, SOLUTION INTRAMUSCULAR; INTRAVENOUS ONCE
Status: COMPLETED | OUTPATIENT
Start: 2024-04-26 | End: 2024-04-26

## 2024-04-26 RX ORDER — ONDANSETRON HYDROCHLORIDE 2 MG/ML
4 INJECTION, SOLUTION INTRAVENOUS EVERY 6 HOURS PRN
Status: DISCONTINUED | OUTPATIENT
Start: 2024-04-26 | End: 2024-04-29 | Stop reason: HOSPADM

## 2024-04-26 RX ORDER — ROCURONIUM BROMIDE 10 MG/ML
INJECTION, SOLUTION INTRAVENOUS
Status: DISCONTINUED | OUTPATIENT
Start: 2024-04-26 | End: 2024-04-26

## 2024-04-26 RX ORDER — METHOCARBAMOL 500 MG/1
500 TABLET, FILM COATED ORAL EVERY 8 HOURS PRN
Status: DISCONTINUED | OUTPATIENT
Start: 2024-04-26 | End: 2024-04-27

## 2024-04-26 RX ORDER — PROPOFOL 10 MG/ML
VIAL (ML) INTRAVENOUS
Status: DISCONTINUED | OUTPATIENT
Start: 2024-04-26 | End: 2024-04-26

## 2024-04-26 RX ORDER — MIDAZOLAM HYDROCHLORIDE 2 MG/2ML
INJECTION, SOLUTION INTRAMUSCULAR; INTRAVENOUS
Status: COMPLETED
Start: 2024-04-26 | End: 2024-04-26

## 2024-04-26 RX ORDER — LIDOCAINE HYDROCHLORIDE 20 MG/ML
INJECTION, SOLUTION EPIDURAL; INFILTRATION; INTRACAUDAL; PERINEURAL
Status: DISCONTINUED | OUTPATIENT
Start: 2024-04-26 | End: 2024-04-26

## 2024-04-26 RX ORDER — POLYETHYLENE GLYCOL 3350 17 G/17G
17 POWDER, FOR SOLUTION ORAL DAILY
Status: DISCONTINUED | OUTPATIENT
Start: 2024-04-26 | End: 2024-04-29 | Stop reason: HOSPADM

## 2024-04-26 RX ORDER — ROPIVACAINE HYDROCHLORIDE 5 MG/ML
30 INJECTION, SOLUTION EPIDURAL; INFILTRATION; PERINEURAL ONCE
Status: DISCONTINUED | OUTPATIENT
Start: 2024-04-26 | End: 2024-04-26 | Stop reason: HOSPADM

## 2024-04-26 RX ORDER — FENTANYL CITRATE 50 UG/ML
INJECTION, SOLUTION INTRAMUSCULAR; INTRAVENOUS
Status: DISCONTINUED | OUTPATIENT
Start: 2024-04-26 | End: 2024-04-26

## 2024-04-26 RX ORDER — HYDROMORPHONE HYDROCHLORIDE 2 MG/ML
0.5 INJECTION, SOLUTION INTRAMUSCULAR; INTRAVENOUS; SUBCUTANEOUS EVERY 5 MIN PRN
Status: DISCONTINUED | OUTPATIENT
Start: 2024-04-26 | End: 2024-04-26 | Stop reason: HOSPADM

## 2024-04-26 RX ORDER — MORPHINE SULFATE 4 MG/ML
4 INJECTION, SOLUTION INTRAMUSCULAR; INTRAVENOUS EVERY 6 HOURS PRN
Status: DISCONTINUED | OUTPATIENT
Start: 2024-04-26 | End: 2024-04-29 | Stop reason: HOSPADM

## 2024-04-26 RX ORDER — DIPHENHYDRAMINE HYDROCHLORIDE 50 MG/ML
25 INJECTION INTRAMUSCULAR; INTRAVENOUS EVERY 6 HOURS PRN
Status: DISCONTINUED | OUTPATIENT
Start: 2024-04-26 | End: 2024-04-26 | Stop reason: HOSPADM

## 2024-04-26 RX ORDER — ONDANSETRON HYDROCHLORIDE 2 MG/ML
4 INJECTION, SOLUTION INTRAVENOUS ONCE
Status: DISCONTINUED | OUTPATIENT
Start: 2024-04-26 | End: 2024-04-26 | Stop reason: HOSPADM

## 2024-04-26 RX ORDER — ACETAMINOPHEN 10 MG/ML
INJECTION, SOLUTION INTRAVENOUS
Status: DISCONTINUED | OUTPATIENT
Start: 2024-04-26 | End: 2024-04-26

## 2024-04-26 RX ORDER — ROPIVACAINE HYDROCHLORIDE 5 MG/ML
INJECTION, SOLUTION EPIDURAL; INFILTRATION; PERINEURAL
Status: COMPLETED | OUTPATIENT
Start: 2024-04-26 | End: 2024-04-26

## 2024-04-26 RX ORDER — HYDROMORPHONE HYDROCHLORIDE 2 MG/ML
INJECTION, SOLUTION INTRAMUSCULAR; INTRAVENOUS; SUBCUTANEOUS
Status: DISCONTINUED | OUTPATIENT
Start: 2024-04-26 | End: 2024-04-26

## 2024-04-26 RX ORDER — LABETALOL HYDROCHLORIDE 5 MG/ML
10 INJECTION, SOLUTION INTRAVENOUS ONCE
Status: COMPLETED | OUTPATIENT
Start: 2024-04-26 | End: 2024-04-26

## 2024-04-26 RX ORDER — ONDANSETRON HYDROCHLORIDE 2 MG/ML
INJECTION, SOLUTION INTRAVENOUS
Status: DISCONTINUED | OUTPATIENT
Start: 2024-04-26 | End: 2024-04-26

## 2024-04-26 RX ORDER — OXYCODONE HYDROCHLORIDE 5 MG/1
5 TABLET ORAL EVERY 6 HOURS PRN
Status: DISCONTINUED | OUTPATIENT
Start: 2024-04-26 | End: 2024-04-27

## 2024-04-26 RX ADMIN — OXYCODONE HYDROCHLORIDE 10 MG: 5 TABLET ORAL at 08:04

## 2024-04-26 RX ADMIN — PHENYLEPHRINE HYDROCHLORIDE 100 MCG: 10 INJECTION INTRAVENOUS at 07:04

## 2024-04-26 RX ADMIN — SODIUM CHLORIDE, SODIUM GLUCONATE, SODIUM ACETATE, POTASSIUM CHLORIDE AND MAGNESIUM CHLORIDE: 526; 502; 368; 37; 30 INJECTION, SOLUTION INTRAVENOUS at 06:04

## 2024-04-26 RX ADMIN — SUGAMMADEX 200 MG: 100 INJECTION, SOLUTION INTRAVENOUS at 09:04

## 2024-04-26 RX ADMIN — LIDOCAINE HYDROCHLORIDE 40 MG: 20 INJECTION, SOLUTION INTRAVENOUS at 07:04

## 2024-04-26 RX ADMIN — METHOCARBAMOL 500 MG: 500 TABLET ORAL at 04:04

## 2024-04-26 RX ADMIN — OXYCODONE HYDROCHLORIDE 10 MG: 5 TABLET ORAL at 02:04

## 2024-04-26 RX ADMIN — ACETAMINOPHEN 1000 MG: 10 INJECTION, SOLUTION INTRAVENOUS at 07:04

## 2024-04-26 RX ADMIN — DEXAMETHASONE SODIUM PHOSPHATE 4 MG: 4 INJECTION, SOLUTION INTRA-ARTICULAR; INTRALESIONAL; INTRAMUSCULAR; INTRAVENOUS; SOFT TISSUE at 07:04

## 2024-04-26 RX ADMIN — HYDROMORPHONE HYDROCHLORIDE 1 MG: 2 INJECTION, SOLUTION INTRAMUSCULAR; INTRAVENOUS; SUBCUTANEOUS at 09:04

## 2024-04-26 RX ADMIN — HYDROMORPHONE HYDROCHLORIDE 0.5 MG: 2 INJECTION, SOLUTION INTRAMUSCULAR; INTRAVENOUS; SUBCUTANEOUS at 09:04

## 2024-04-26 RX ADMIN — METHOCARBAMOL 500 MG: 500 TABLET ORAL at 11:04

## 2024-04-26 RX ADMIN — MIDAZOLAM HYDROCHLORIDE 2 MG: 1 INJECTION, SOLUTION INTRAMUSCULAR; INTRAVENOUS at 06:04

## 2024-04-26 RX ADMIN — CEFAZOLIN SODIUM 2 G: 2 SOLUTION INTRAVENOUS at 03:04

## 2024-04-26 RX ADMIN — MIDAZOLAM HYDROCHLORIDE 2 MG: 2 INJECTION, SOLUTION INTRAMUSCULAR; INTRAVENOUS at 06:04

## 2024-04-26 RX ADMIN — CEFAZOLIN SODIUM 2 G: 2 SOLUTION INTRAVENOUS at 10:04

## 2024-04-26 RX ADMIN — MORPHINE SULFATE 4 MG: 4 INJECTION, SOLUTION INTRAMUSCULAR; INTRAVENOUS at 10:04

## 2024-04-26 RX ADMIN — HYDROMORPHONE HYDROCHLORIDE 0.5 MG: 2 INJECTION, SOLUTION INTRAMUSCULAR; INTRAVENOUS; SUBCUTANEOUS at 10:04

## 2024-04-26 RX ADMIN — MEPERIDINE HYDROCHLORIDE 12.5 MG: 25 INJECTION INTRAMUSCULAR; INTRAVENOUS; SUBCUTANEOUS at 10:04

## 2024-04-26 RX ADMIN — MORPHINE SULFATE 4 MG: 4 INJECTION, SOLUTION INTRAMUSCULAR; INTRAVENOUS at 05:04

## 2024-04-26 RX ADMIN — ROPIVACAINE HYDROCHLORIDE 30 ML: 5 INJECTION, SOLUTION EPIDURAL; INFILTRATION; PERINEURAL at 06:04

## 2024-04-26 RX ADMIN — Medication 50 MG: at 07:04

## 2024-04-26 RX ADMIN — CEFAZOLIN SODIUM 2 G: 2 SOLUTION INTRAVENOUS at 07:04

## 2024-04-26 RX ADMIN — ROCURONIUM BROMIDE 70 MG: 10 SOLUTION INTRAVENOUS at 07:04

## 2024-04-26 RX ADMIN — LABETALOL HYDROCHLORIDE 10 MG: 5 INJECTION, SOLUTION INTRAVENOUS at 09:04

## 2024-04-26 RX ADMIN — METHOCARBAMOL 500 MG: 500 TABLET ORAL at 10:04

## 2024-04-26 RX ADMIN — ONDANSETRON 4 MG: 2 INJECTION INTRAMUSCULAR; INTRAVENOUS at 07:04

## 2024-04-26 RX ADMIN — FENTANYL CITRATE 100 MCG: 50 INJECTION, SOLUTION INTRAMUSCULAR; INTRAVENOUS at 07:04

## 2024-04-26 RX ADMIN — HYDROMORPHONE HYDROCHLORIDE 0.5 MG: 2 INJECTION, SOLUTION INTRAMUSCULAR; INTRAVENOUS; SUBCUTANEOUS at 12:04

## 2024-04-26 RX ADMIN — HYDROMORPHONE HYDROCHLORIDE 0.5 MG: 2 INJECTION, SOLUTION INTRAMUSCULAR; INTRAVENOUS; SUBCUTANEOUS at 11:04

## 2024-04-26 RX ADMIN — ENOXAPARIN SODIUM 40 MG: 40 INJECTION SUBCUTANEOUS at 04:04

## 2024-04-26 RX ADMIN — MUPIROCIN: 20 OINTMENT TOPICAL at 06:04

## 2024-04-26 RX ADMIN — Medication 150 MG: at 07:04

## 2024-04-26 NOTE — ANESTHESIA PROCEDURE NOTES
Intubation    Date/Time: 4/26/2024 7:08 AM    Performed by: Lizandro Berg CRNA  Authorized by: Vivian Aguilar MD    Intubation:     Induction:  Intravenous    Intubated:  Postinduction    Mask Ventilation:  Easy mask    Attempts:  1    Attempted By:  CRNA    Method of Intubation:  Direct    Blade:  Pop 2    Laryngeal View Grade: Grade I - full view of cords      Difficult Airway Encountered?: No      Complications:  None    Airway Device:  Oral endotracheal tube    Airway Device Size:  7.5    Style/Cuff Inflation:  Cuffed (inflated to minimal occlusive pressure)    Tube secured:  23    Secured at:  The lips    Placement Verified By:  Capnometry    Complicating Factors:  None    Findings Post-Intubation:  BS equal bilateral and atraumatic/condition of teeth unchanged

## 2024-04-26 NOTE — BRIEF OP NOTE
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 Item Serial No.  Lot No. LRB No. Used Action   CLAMP COMBINATION / LG EXT FIX - [id number]  CLAMP COMBINATION / LG EXT FIX  [employer]  Left 4 Implanted   CLAMP LG 6 POSITION MULTI-PIN - [id number]  CLAMP LG 6 POSITION MULTI-PIN  [employer]  Left 2 Implanted   SCREW SCHANZ 60MM 5X175 - [id number]  SCREW SCHANZ 60MM 5X175  [employer]  Left 4 Implanted   POST FIXATOR EXTERAL 11MM SS - [id number]  POST FIXATOR EXTERAL 11MM SS  [employer]  Left 2 Implanted   30 DEGREE OUTRIGGER POST, 11MM    [employer]  Left 2 Implanted   ANTONY CARBON FIBER 11MM X 400MM - [id number]  ANTONY CARBON FIBER 11MM X 400MM  [employer]  Left 2 Implanted       Operative Findings: see op report    Estimated Blood Loss: 50 mL    Estimated Blood Loss has been documented.         Specimens:   Specimen (24h ago, onward)      None            [id number]  A/P: Tolerated procedure well. Admit to floor. WBAT to Stand and transfer only, No ambulation. Lovenox for DVT ppx. Ok to resume oral anticoagulation upon d/c.      Curtis Corona MD  Orthopedic Trauma  [facility]

## 2024-04-26 NOTE — ANESTHESIA POSTPROCEDURE EVALUATION
Anesthesia Post Evaluation    Patient: Briseyda Torres    Procedure(s) Performed: Procedure(s) (LRB):  ORIF, FRACTURE, PATELLA WITH EX_FIX APPLICATION (Left)    Final Anesthesia Type: general      Patient location during evaluation: PACU  Patient participation: Yes- Able to Participate  Level of consciousness: awake and alert  Post-procedure vital signs: reviewed and stable  Pain management: adequate  Airway patency: patent      Anesthetic complications: no      Cardiovascular status: hemodynamically stable  Respiratory status: unassisted  Hydration status: euvolemic  Follow-up not needed.              Vitals Value Taken Time   /106 04/26/24 1031   Temp 36.5 °C (97.7 °F) 04/26/24 1009   Pulse 90 04/26/24 1034   Resp 9 04/26/24 1034   SpO2 97 % 04/26/24 1034   Vitals shown include unfiled device data.      No case tracking events are documented in the log.      Pain/Delma Score: Pain Rating Prior to Med Admin: 6 (4/26/2024 10:00 AM)  Delma Score: 7 (4/26/2024 10:00 AM)

## 2024-04-26 NOTE — ANESTHESIA PROCEDURE NOTES
Adductor Canal Block    Patient location during procedure: pre-op   Block not for primary anesthetic.  Reason for block: at surgeon's request and post-op pain management   Post-op Pain Location: Left knee   Start time: 4/26/2024 6:54 AM  Timeout: 4/26/2024 6:54 AM   End time: 4/26/2024 6:56 AM    Staffing  Authorizing Provider: Vivian Aguilar MD  Performing Provider: Vivian Aguilar MD    Staffing  Performed by: Vivian Aguilar MD  Authorized by: Vivian Aguilar MD    Preanesthetic Checklist  Completed: patient identified, IV checked, site marked, risks and benefits discussed, surgical consent, monitors and equipment checked, pre-op evaluation and timeout performed  Peripheral Block  Patient position: supine  Prep: ChloraPrep  Patient monitoring: heart rate, cardiac monitor, continuous pulse ox, continuous capnometry and frequent blood pressure checks  Block type: adductor canal  Laterality: left  Injection technique: single shot  Needle  Needle type: Stimuplex   Needle gauge: 21 G  Needle length: 4 in  Needle localization: anatomical landmarks and ultrasound guidance  Needle insertion depth: 6 cm   -ultrasound image captured on disc.  Assessment  Injection assessment: negative aspiration, negative parasthesia and local visualized surrounding nerve  Paresthesia pain: none  Heart rate change: no  Slow fractionated injection: yes  Pain Tolerance: comfortable throughout block and no complaints  Medications:    Medications: ropivacaine (NAROPIN) injection 0.5% - Perineural   30 mL - 4/26/2024 6:56:00 AM    Additional Notes  VSS.  [facility] RN monitoring vitals throughout procedure.  Patient tolerated procedure well.

## 2024-04-26 NOTE — OP NOTE
[facility]                       [address]                      [place] [address]    PATIENT NAME:      ROSY ASHRAF  YOB: 1960  CSN:               [id number]  MRN:               [id number]  ADMIT DATE:        04/26/2024 05:30:00  PHYSICIAN:         Curtis Corona MD                          OPERATIVE REPORT      DATE OF SURGERY:    04/26/2024 00:00:00    SURGEON:  Curtis Corona MD    PREOPERATIVE DIAGNOSIS:  Left patella comminuted fracture with nonunion.    POSTOPERATIVE DIAGNOSIS:  Left patella comminuted fracture with nonunion.    PROCEDURES:    1. Partial patellectomy of the left knee.  2. Repair of quad tendon, left knee.  3. Application of uniplanar external fixator, left lower extremity.  4. Removal of deep implants, left knee.    ANESTHESIA:  General.    ESTIMATED BLOOD LOSS:  50 mL.    TOURNIQUET TIME:  80 minutes.    ASSISTANT:  NELDA Weinberg, necessary for a skilled set of hands to assist   with deep retraction as well as removal of her hardware and patella and   application of external fixator.    COMPLICATIONS:  None.    COUNTS:  All counts correct x2 at the end of the case.    INDICATIONS FOR PROCEDURE:  The patient is a 63-year-old female, who has had an   unfortunate course following an injury back in October, she sustained a   comminuted left patella fracture.  She underwent open reduction and internal   fixation.  She had a subsequent wound dehiscence that was repaired.  She then   healed very well.  She progressed with physical therapy.  However, she had a   nonunion of the patella fracture, and subsequently failed with prominence of her   hardware.  We then took her back to the operating room for revision internal   fixation of her nonunion.  She then had a fall at home and again had a failure   of her hardware.  She is returning to the operating room today for partial   patellectomy of  comminuted and irreparable injury to her superior pole with   repair of her quad tendon back down to the inferior pole.  The plan is for   placement of an external fixator in order to protect the repair.    PROCEDURE IN DETAIL:  After informed consent was obtained, the patient was met   in the preoperative holding area and her site was marked.  She was taken to the   operating room.  She was placed supine on the operating table.  General   anesthesia was induced.  All bony prominences were well padded and preoperative   antibiotics were given.  Her left lower extremity was prepped and draped in a   standard sterile fashion.  A time-out was done indicating correct operative limb   and procedure.  Her previous incision was opened.  She had a complete   separation of her fracture.  Her superior pole was in multiple fragments.  Her   inferior pole remained intact.  The hardware was removed in its entirety,   including 2 cannulated screws and wires.  We then performed a patellectomy of   the superior pole, removing the multiple fragmented portions and leaving a good   end of her quad tendon for repair.  We then utilized four #5 FiberWire sutures   with Krackow proximally.  I then placed Krackow sutures into the patellar tendon   through the inferior pole.  We pulled the quad tendon down.  It had good   contact with the superior pole of patella.  We were able to scrape and clean.    The sutures were tied.  Once the quad tendon was clamped back down to the   inferior pole, good bony contact was maintained.  The retinaculum was repaired   with a #1 PDS.  A gram of vancomycin was placed in the wound.  It was irrigated   with the Xperience antimicrobial solution.  A layered closure was then performed   with #1 PDS, 2-0 Monocryl, and staples.  Xeroform and a Prevena wound VAC were   applied.  We then applied an external fixator knee spanning with 2 pins in the   femur and 2 pins in the tibia.  The tibia pins were drilled.  The  knee was put   in full extension in order to protect the quad tendon repair.  AP and lateral   x-rays confirmed the knee was in full extension.  Pin sites were dressed with   Xeroform, Kerlix rolls, and Ace bandage was applied.  She was awakened,   extubated, and taken to recovery in stable condition.    POSTOPERATIVE PLAN:  She will be admitted for 23-hour observation.  She can   weightbear to the left lower extremity to stand and transfer only.  No   ambulation.  However, work with PT tomorrow.  We will see her back in 1 week for   removal of her Prevena and wound check.    ______________________________  MD JUSTINO Horan/SALLY  DD:  04/26/2024  Time:  09:23AM  DT:  04/26/2024  Time:  12:27PM  Job #:  [id number]      OPERATIVE REPORT

## 2024-04-26 NOTE — ANESTHESIA PREPROCEDURE EVALUATION
04/26/2024  Briseyda Torres is a 63 y.o., female.      Pre-op Assessment    I have reviewed the Patient Summary Reports.     I have reviewed the Nursing Notes. I have reviewed the NPO Status.   I have reviewed the Medications.     Review of Systems  Cardiovascular:     Hypertension                     Shortness of Breath                    Hypertension         Pulmonary:      Shortness of breath                  Neurological:   CVA Neuromuscular Disease,  Headaches      Dx of Headaches              CVA - Cerebrovasular Accident               Neuromuscular Disease          Anesthesia Plan  Type of Anesthesia, risks & benefits discussed:    Anesthesia Type: Gen ETT  Intra-op Monitoring Plan: Standard ASA Monitors  Post Op Pain Control Plan: multimodal analgesia and peripheral nerve block  Induction:  IV  Airway Plan: Direct, Post-Induction  Informed Consent: Informed consent signed with the Patient and all parties understand the risks and agree with anesthesia plan.  All questions answered. Patient consented to blood products? Yes  ASA Score: 2  Day of Surgery Review of History & Physical: H&P Update referred to the surgeon/provider.  Anesthesia Plan Notes: Consider LMA if no muscle relaxation required  Adductor canal block     Ready For Surgery From Anesthesia Perspective.     .

## 2024-04-27 PROCEDURE — 63600175 PHARM REV CODE 636 W HCPCS

## 2024-04-27 PROCEDURE — 63600175 PHARM REV CODE 636 W HCPCS: Performed by: ORTHOPAEDIC SURGERY

## 2024-04-27 PROCEDURE — 97162 PT EVAL MOD COMPLEX 30 MIN: CPT

## 2024-04-27 PROCEDURE — 25000003 PHARM REV CODE 250: Performed by: ORTHOPAEDIC SURGERY

## 2024-04-27 PROCEDURE — 94760 N-INVAS EAR/PLS OXIMETRY 1: CPT

## 2024-04-27 PROCEDURE — 25000003 PHARM REV CODE 250

## 2024-04-27 PROCEDURE — G0378 HOSPITAL OBSERVATION PER HR: HCPCS

## 2024-04-27 PROCEDURE — 96372 THER/PROPH/DIAG INJ SC/IM: CPT

## 2024-04-27 PROCEDURE — 96372 THER/PROPH/DIAG INJ SC/IM: CPT | Performed by: ORTHOPAEDIC SURGERY

## 2024-04-27 RX ORDER — LOSARTAN POTASSIUM 50 MG/1
50 TABLET ORAL DAILY
Status: DISCONTINUED | OUTPATIENT
Start: 2024-04-27 | End: 2024-04-29 | Stop reason: HOSPADM

## 2024-04-27 RX ORDER — OXYCODONE HYDROCHLORIDE 10 MG/1
10 TABLET ORAL EVERY 4 HOURS PRN
Qty: 42 TABLET | Refills: 0 | Status: SHIPPED | OUTPATIENT
Start: 2024-04-27 | End: 2024-05-04

## 2024-04-27 RX ORDER — KETOROLAC TROMETHAMINE 10 MG/1
10 TABLET, FILM COATED ORAL EVERY 6 HOURS
Qty: 20 TABLET | Refills: 0 | Status: SHIPPED | OUTPATIENT
Start: 2024-04-27 | End: 2024-05-02

## 2024-04-27 RX ORDER — KETOROLAC TROMETHAMINE 30 MG/ML
30 INJECTION, SOLUTION INTRAMUSCULAR; INTRAVENOUS ONCE
Status: COMPLETED | OUTPATIENT
Start: 2024-04-27 | End: 2024-04-27

## 2024-04-27 RX ORDER — METHOCARBAMOL 500 MG/1
500 TABLET, FILM COATED ORAL EVERY 8 HOURS PRN
Qty: 30 TABLET | Refills: 0 | Status: SHIPPED | OUTPATIENT
Start: 2024-04-27 | End: 2024-05-07

## 2024-04-27 RX ORDER — ACETAMINOPHEN 325 MG/1
650 TABLET ORAL EVERY 6 HOURS PRN
Status: DISCONTINUED | OUTPATIENT
Start: 2024-04-27 | End: 2024-04-29 | Stop reason: HOSPADM

## 2024-04-27 RX ORDER — OXYCODONE AND ACETAMINOPHEN 10; 325 MG/1; MG/1
1 TABLET ORAL EVERY 4 HOURS PRN
Status: DISCONTINUED | OUTPATIENT
Start: 2024-04-27 | End: 2024-04-29 | Stop reason: HOSPADM

## 2024-04-27 RX ORDER — OXYCODONE AND ACETAMINOPHEN 5; 325 MG/1; MG/1
1 TABLET ORAL EVERY 4 HOURS PRN
Status: DISCONTINUED | OUTPATIENT
Start: 2024-04-27 | End: 2024-04-29 | Stop reason: HOSPADM

## 2024-04-27 RX ORDER — METHOCARBAMOL 750 MG/1
750 TABLET, FILM COATED ORAL EVERY 8 HOURS PRN
Status: DISCONTINUED | OUTPATIENT
Start: 2024-04-27 | End: 2024-04-29 | Stop reason: HOSPADM

## 2024-04-27 RX ORDER — OXYCODONE HYDROCHLORIDE 10 MG/1
10 TABLET ORAL EVERY 4 HOURS PRN
Qty: 42 TABLET | Refills: 0 | Status: SHIPPED | OUTPATIENT
Start: 2024-04-27 | End: 2024-04-27

## 2024-04-27 RX ADMIN — OXYCODONE HYDROCHLORIDE 10 MG: 5 TABLET ORAL at 02:04

## 2024-04-27 RX ADMIN — OXYCODONE HYDROCHLORIDE 10 MG: 5 TABLET ORAL at 06:04

## 2024-04-27 RX ADMIN — ONDANSETRON 4 MG: 2 INJECTION INTRAMUSCULAR; INTRAVENOUS at 09:04

## 2024-04-27 RX ADMIN — OXYCODONE HYDROCHLORIDE 10 MG: 5 TABLET ORAL at 01:04

## 2024-04-27 RX ADMIN — LOSARTAN POTASSIUM 50 MG: 50 TABLET, FILM COATED ORAL at 10:04

## 2024-04-27 RX ADMIN — OXYCODONE HYDROCHLORIDE 10 MG: 5 TABLET ORAL at 10:04

## 2024-04-27 RX ADMIN — METHOCARBAMOL 500 MG: 500 TABLET ORAL at 06:04

## 2024-04-27 RX ADMIN — METHOCARBAMOL 750 MG: 750 TABLET ORAL at 11:04

## 2024-04-27 RX ADMIN — ENOXAPARIN SODIUM 40 MG: 40 INJECTION SUBCUTANEOUS at 04:04

## 2024-04-27 RX ADMIN — ACETAMINOPHEN 650 MG: 325 TABLET, FILM COATED ORAL at 10:04

## 2024-04-27 RX ADMIN — OXYCODONE HYDROCHLORIDE AND ACETAMINOPHEN 1 TABLET: 10; 325 TABLET ORAL at 11:04

## 2024-04-27 RX ADMIN — MORPHINE SULFATE 4 MG: 4 INJECTION, SOLUTION INTRAMUSCULAR; INTRAVENOUS at 09:04

## 2024-04-27 RX ADMIN — METHOCARBAMOL 500 MG: 500 TABLET ORAL at 02:04

## 2024-04-27 RX ADMIN — KETOROLAC TROMETHAMINE 30 MG: 30 INJECTION, SOLUTION INTRAMUSCULAR at 10:04

## 2024-04-27 RX ADMIN — CEFAZOLIN SODIUM 2 G: 2 SOLUTION INTRAVENOUS at 06:04

## 2024-04-27 NOTE — PROGRESS NOTES
[facility] Neuro  Orthopedics  Progress Note    Patient Name: Briseyda Torres  MRN: [id number]  Admission Date: 4/26/2024  Hospital Length of Stay: 0 days  Attending Provider: Curtis Corona MD  Primary Care Provider: Deo Jacobs MD  Follow-up For: Procedure(s) (LRB):  ORIF, FRACTURE, PATELLA WITH EX_FIX APPLICATION (Left)    Post-Operative Day: 1 Day Post-Op  Subjective:     Principal Problem: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    Principal Orthopedic Problem: 1 Day Post-Op   ORIF left patella fx    Interval History: patient laying in bed AA; complaints of constant pain and states that no medications seem to work for her; CINDY reported overnight; knee immobilizer in place to the LLE    Review of patient's allergies indicates:   Allergen Reactions    Azithromycin     Levofloxacin     Lisinopril Edema       Current Facility-Administered Medications   Medication Dose Route Frequency Provider Last Rate Last Admin    acetaminophen tablet 650 mg  650 mg Oral Q8H Hellen Alberts PA-C        enoxaparin injection 40 mg  40 mg Subcutaneous Daily Hellen Alberts PA-C   40 mg at 04/26/24 1616    methocarbamoL tablet 500 mg  500 mg Oral Q8H PRN Curtis Corona MD   500 mg at 04/27/24 0614    morphine injection 4 mg  4 mg Intravenous Q6H PRN Hellen Alberts PA-C   4 mg at 04/26/24 2230    ondansetron injection 4 mg  4 mg Intravenous Q6H PRN Hellen Alberts PA-C        oxyCODONE immediate release tablet 5 mg  5 mg Oral Q6H PRN Hellen Alberts PA-C        oxyCODONE immediate release tablet Tab 10 mg  10 mg Oral Q4H PRN Hellen Alberts PA-C   10 mg at 04/27/24 0615    polyethylene glycol packet 17 g  17 g Oral Daily Hellen Alberts PA-C         Objective:     Vital Signs (Most Recent):  Temp: 99.4 °F (37.4 °C) (04/27/24 0815)  Pulse: 91 (04/27/24 0815)  Resp: 20 (04/27/24 0737)  BP: (!) 160/72 (04/27/24 0815)  SpO2: 96 % (04/27/24 0900) Vital Signs (24h  "Range):  Temp:  [97.7 °F (36.5 °C)-100.9 °F (38.3 °C)] 99.4 °F (37.4 °C)  Pulse:  [76-99] 91  Resp:  [11-20] 20  SpO2:  [90 %-100 %] 96 %  BP: (105-208)/() 160/72     Weight: 98.2 kg (216 lb 7.9 oz)  Height: 5' 6" (167.6 cm)  Body mass index is 34.94 kg/m².      Intake/Output Summary (Last 24 hours) at 4/27/2024 0926  Last data filed at 4/26/2024 1450  Gross per 24 hour   Intake --   Output 2000 ml   Net -2000 ml       Physical Exam:   Ortho/SPM Exam  Musculoskeletal:                  LLE: -Skin: Dressing CDI,            -MSK: EHL/FHL, Gastroc/Tib anterior Strength 5/5           -Neuro:  Sensation intact to light touch           -Lymphatic: No signs of lymphadenopathy           -CV: Capillary refill is less than 2 seconds. DP/PT pulses 2/4. Compartments soft and compressible                        Diagnostic Findings:   Significant Labs:   Recent Lab Results       None          All pertinent labs within the past 24 hours have been reviewed.  Recent Lab Results       None             Significant Imaging: I have reviewed all pertinent imaging results/findings.     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 No ROM. Immobilizer in place at all times x30days. Lovenox for DVT ppx while in house. Added Toradol to pain regimen to help with pain control. Rx printed for patient. OK to resume PO anticoag once d/c. Able to d/c home today. Follow up with Dr Corona in the office x2 weeks for evaluation.     The above findings, diagnostics, and treatment plan were discussed with Dr. Soren Gruber who is in agreement with the plan of care.        Nancy Hernandez, FNP-C  Orthopedic Surgery  [facility] - Ortho Neuro    "

## 2024-04-27 NOTE — PT/OT/SLP EVAL
Physical Therapy Evaluation    Patient Name:  Briseyda Torres   MRN:  [id number]    Recommendations:     Discharge therapy intensity: Moderate Intensity Therapy   Discharge Equipment Recommendations:  (TBD)   Barriers to discharge: Decreased caregiver support, Impaired mobility, and Ongoing medical needs    Assessment:     Briseyda Torres is a 63 y.o. female admitted with a medical diagnosis of (L) patella non-union s/p ORIF with ex-fix application.  She presents with the following impairments/functional limitations: weakness, impaired functional mobility, impaired endurance, impaired balance, decreased lower extremity function, orthopedic precautions. Pt tolerated session fair, limited 2/2 pain. Pt states she has had multiple falls over the past few months leading to surgical complications and revisions, now with ex-fix in place. Pt states she stays mostly bed-bound at home due to multiple falls, fear of falling. Pt required Macy for bed mobility, once seated at EOB able to tolerate SBA. Pt able to scoot towards HOB with CGA and assistance managing LLE. LLE in extension with ex-fix at knee. At current functional status, anticipate pt to progress well with SB/scoot pivot transfers and w/c level mobility. Recommending moderate intensity therapy to improve t/f status and functional independence to decrease fall risk at d/c.     Rehab Prognosis: Good; patient would benefit from acute skilled PT services to address these deficits and reach maximum level of function.    Recent Surgery: Procedure(s) (LRB):  ORIF, FRACTURE, PATELLA WITH EX_FIX APPLICATION (Left) 1 Day Post-Op    Plan:     During this hospitalization, patient would benefit from acute PT services 5 x/week to address the identified rehab impairments via therapeutic activities, therapeutic exercises, neuromuscular re-education and progress toward the following goals:    Plan of Care Expires:  05/25/24    Subjective     Chief Complaint: pain  Patient/Family  Comments/goals: to decrease pain  Pain/Comfort:  Pain Rating 1: 10/10  Location - Side 1: Left  Location 1: leg    Patients cultural, spiritual, Religion conflicts given the current situation:      Living Environment:  Pt lives with spouse in a SLH with ramp entry. Pt has been dealing with L leg surgeries since October 2023, further complicated by dehiscence and falls leading to 4 more surgeries.   Prior to admission, patients level of function was ambulatory with crutches and KI; however, multiple falls.  Equipment used at home: crutches, axillary, wheelchair, bedside commode.  DME owned (not currently used): none.  Upon discharge, patient will have assistance from spouse.    Objective:     Communicated with RN prior to session.  Patient found HOB elevated with peripheral IV, wound vac (ex-fix)  upon PT entry to room.    General Precautions: Standard, fall  Orthopedic Precautions:LLE weight bearing as tolerated (for t/f only)   Braces:  (exfix)  Respiratory Status: Room air      Exams:  Sensation:    -       Intact  RLE Strength: 4-  LLE Strength: N/T 2/2 recent sx, LLE in extension  Skin integrity:  areas of known impairment from sx/ex-fix application      Functional Mobility:  Bed Mobility:     Rolling Left:  minimum assistance for LE management  Rolling Right: minimum assistance for LE management  Scooting: contact guard assistance x 3 with assistance to manage LLE  Supine to Sit: minimum assistance  Sit to Supine: minimum assistance  Balance: good static sitting EOB and with scooting towards HOB      AM-PAC 6 CLICK MOBILITY  Total Score:12       Treatment & Education:    Patient and spouse were provided with verbal education education regarding PT role/goals/POC, post-op precautions, fall prevention, and safety awareness.  Understanding was verbalized.     Patient left HOB elevated with all lines intact, call button in reach, and spouse present.    GOALS:   Multidisciplinary Problems       Physical Therapy  Goals          Problem: Physical Therapy    Goal Priority Disciplines Outcome Goal Variances Interventions   Physical Therapy Goal     PT, PT/OT Progressing     Description: Goals to be met by: d/c     Patient will increase functional independence with mobility by performin. Supine to sit with Stand-by Assistance  2. Sit to supine with Stand-by Assistance  3. Sit to stand transfer with Minimal Assistance  4. Bed to chair transfer with Stand-by Assistance using Slideboard  5. Wheelchair propulsion x100 feet with Supervision using bilateral uppper extremities                         History:     Past Medical History:   Diagnosis Date    Acute deep vein thrombosis (DVT) of left lower extremity, unspecified vein 10/12/2023    pt stated not completely resolved however pcp stopped eliquis 1mo ago and dr kim will restart postop per pt    Allergies     Anxiety     Arthritis     Closed displaced comminuted fracture of left patella, initial encounter     Closed displaced comminuted fracture of left patella, sequela     Digestive disorder     acid reflux    Fibromyalgia     HLD (hyperlipidemia)     Hypertension     Insomnia     Migraine     Obesity     Painful orthopaedic hardware     Plantar fasciitis, right     SOB (shortness of breath)     Stroke     Systemic lupus erythematosus, organ or system involvement unspecified     Thyroid disease        Past Surgical History:   Procedure Laterality Date    CATARACT EXTRACTION      CHOLECYSTECTOMY      COLONOSCOPY      ESOPHAGOGASTRODUODENOSCOPY      HYSTERECTOMY      INCISION AND DRAINAGE, LOWER EXTREMITY Left 2023    Procedure: INCISION AND DRAINAGE, LOWER EXTREMITY;  Surgeon: Curtis Kim MD;  Location: [facility];  Service: Orthopedics;  Laterality: Left;  I & D left knee for dehiscence/vascular table/bone foam/3L saline with cysto tubing/Vancomycin/Xperience irrigation/13cm Prevena    LUMBAR SPINE SURGERY      OPEN REDUCTION AND INTERNAL FIXATION (ORIF) OF  FRACTURE OF PATELLA Left 10/27/2023    Procedure: ORIF, FRACTURE, PATELLA, LEFT;  Surgeon: Curtis Corona MD;  Location: [facility] OR;  Service: Orthopedics;  Laterality: Left;  Supine, vascular bed, bone foam, tourniquet  1st case  Synthes small CCHS, 18g wire, locking patella plates    OPEN REDUCTION AND INTERNAL FIXATION (ORIF) OF FRACTURE OF PATELLA Left 4/9/2024    Procedure: REVISION ORIF, FRACTURE, PATELLA;  Surgeon: Curtis Corona MD;  Location: [facility] OR;  Service: Orthopedics;  Laterality: Left;    ROTATOR CUFF REPAIR Bilateral     total of 4 surgeries    THYROIDECTOMY      TOTAL HIP ARTHROPLASTY Right     TOTAL KNEE ARTHROPLASTY Right     c       Time Tracking:     PT Received On: 04/27/24  PT Start Time: 1143     PT Stop Time: 1203  PT Total Time (min): 20 min     Billable Minutes: Evaluation 20 04/27/2024

## 2024-04-27 NOTE — NURSING
Nurses Note -- 4 Eyes      4/26/2024   7:39 PM      Skin assessed during: Admit      [x] No Altered Skin Integrity Present    []Prevention Measures Documented      [] Yes- Altered Skin Integrity Present or Discovered   [] LDA Added if Not in Epic (Describe Wound)   [] New Altered Skin Integrity was Present on Admit and Documented in LDA   [] Wound Image Taken    Wound Care Consulted? No    Attending Nurse:  Nata keys charge    Second RN/Staff Member:   Kelly keys

## 2024-04-28 LAB
ALBUMIN SERPL-MCNC: 3.4 G/DL (ref 3.4–4.8)
ALBUMIN/GLOB SERPL: 1 RATIO (ref 1.1–2)
ALP SERPL-CCNC: 90 UNIT/L (ref 40–150)
ALT SERPL-CCNC: 10 UNIT/L (ref 0–55)
AST SERPL-CCNC: 15 UNIT/L (ref 5–34)
BASOPHILS # BLD AUTO: 0.04 X10(3)/MCL
BASOPHILS NFR BLD AUTO: 0.4 %
BILIRUB SERPL-MCNC: 0.3 MG/DL
BUN SERPL-MCNC: 5.8 MG/DL (ref 9.8–20.1)
CALCIUM SERPL-MCNC: 8.3 MG/DL (ref 8.4–10.2)
CHLORIDE SERPL-SCNC: 102 MMOL/L (ref 98–107)
CO2 SERPL-SCNC: 26 MMOL/L (ref 23–31)
CREAT SERPL-MCNC: 0.82 MG/DL (ref 0.55–1.02)
EOSINOPHIL # BLD AUTO: 0.65 X10(3)/MCL (ref 0–0.9)
EOSINOPHIL NFR BLD AUTO: 6.8 %
ERYTHROCYTE [DISTWIDTH] IN BLOOD BY AUTOMATED COUNT: 14.4 % (ref 11.5–17)
GFR SERPLBLD CREATININE-BSD FMLA CKD-EPI: >60 MLS/MIN/1.73/M2
GLOBULIN SER-MCNC: 3.4 GM/DL (ref 2.4–3.5)
GLUCOSE SERPL-MCNC: 147 MG/DL (ref 82–115)
HCT VFR BLD AUTO: 33.8 % (ref 37–47)
HGB BLD-MCNC: 10.8 G/DL (ref 12–16)
IMM GRANULOCYTES # BLD AUTO: 0.02 X10(3)/MCL (ref 0–0.04)
IMM GRANULOCYTES NFR BLD AUTO: 0.2 %
LYMPHOCYTES # BLD AUTO: 2.01 X10(3)/MCL (ref 0.6–4.6)
LYMPHOCYTES NFR BLD AUTO: 21 %
MCH RBC QN AUTO: 28.3 PG (ref 27–31)
MCHC RBC AUTO-ENTMCNC: 32 G/DL (ref 33–36)
MCV RBC AUTO: 88.5 FL (ref 80–94)
MONOCYTES # BLD AUTO: 0.94 X10(3)/MCL (ref 0.1–1.3)
MONOCYTES NFR BLD AUTO: 9.8 %
NEUTROPHILS # BLD AUTO: 5.9 X10(3)/MCL (ref 2.1–9.2)
NEUTROPHILS NFR BLD AUTO: 61.8 %
NRBC BLD AUTO-RTO: 0 %
PLATELET # BLD AUTO: 256 X10(3)/MCL (ref 130–400)
PMV BLD AUTO: 10.2 FL (ref 7.4–10.4)
POTASSIUM SERPL-SCNC: 3.5 MMOL/L (ref 3.5–5.1)
PROT SERPL-MCNC: 6.8 GM/DL (ref 5.8–7.6)
RBC # BLD AUTO: 3.82 X10(6)/MCL (ref 4.2–5.4)
SODIUM SERPL-SCNC: 136 MMOL/L (ref 136–145)
WBC # SPEC AUTO: 9.56 X10(3)/MCL (ref 4.5–11.5)

## 2024-04-28 PROCEDURE — 25000003 PHARM REV CODE 250: Performed by: INTERNAL MEDICINE

## 2024-04-28 PROCEDURE — 80053 COMPREHEN METABOLIC PANEL: CPT | Performed by: ORTHOPAEDIC SURGERY

## 2024-04-28 PROCEDURE — 63600175 PHARM REV CODE 636 W HCPCS: Performed by: INTERNAL MEDICINE

## 2024-04-28 PROCEDURE — 25000003 PHARM REV CODE 250

## 2024-04-28 PROCEDURE — 36415 COLL VENOUS BLD VENIPUNCTURE: CPT | Performed by: ORTHOPAEDIC SURGERY

## 2024-04-28 PROCEDURE — 25000003 PHARM REV CODE 250: Performed by: ORTHOPAEDIC SURGERY

## 2024-04-28 PROCEDURE — G0378 HOSPITAL OBSERVATION PER HR: HCPCS

## 2024-04-28 PROCEDURE — 96372 THER/PROPH/DIAG INJ SC/IM: CPT

## 2024-04-28 PROCEDURE — 85025 COMPLETE CBC W/AUTO DIFF WBC: CPT | Performed by: ORTHOPAEDIC SURGERY

## 2024-04-28 PROCEDURE — 63600175 PHARM REV CODE 636 W HCPCS

## 2024-04-28 RX ORDER — METOPROLOL TARTRATE 50 MG/1
50 TABLET ORAL 2 TIMES DAILY
Status: DISCONTINUED | OUTPATIENT
Start: 2024-04-28 | End: 2024-04-29 | Stop reason: HOSPADM

## 2024-04-28 RX ORDER — METOPROLOL TARTRATE 25 MG/1
25 TABLET, FILM COATED ORAL 2 TIMES DAILY
Status: DISCONTINUED | OUTPATIENT
Start: 2024-04-28 | End: 2024-04-28

## 2024-04-28 RX ORDER — ATORVASTATIN CALCIUM 40 MG/1
40 TABLET, FILM COATED ORAL NIGHTLY
Status: DISCONTINUED | OUTPATIENT
Start: 2024-04-28 | End: 2024-04-29 | Stop reason: HOSPADM

## 2024-04-28 RX ORDER — HYDRALAZINE HYDROCHLORIDE 25 MG/1
25 TABLET, FILM COATED ORAL ONCE
Status: COMPLETED | OUTPATIENT
Start: 2024-04-28 | End: 2024-04-28

## 2024-04-28 RX ORDER — ALPRAZOLAM 0.5 MG/1
1 TABLET ORAL NIGHTLY
Status: DISCONTINUED | OUTPATIENT
Start: 2024-04-28 | End: 2024-04-29 | Stop reason: HOSPADM

## 2024-04-28 RX ORDER — LIOTHYRONINE SODIUM 5 UG/1
5 TABLET ORAL EVERY MORNING
Status: DISCONTINUED | OUTPATIENT
Start: 2024-04-28 | End: 2024-04-29 | Stop reason: HOSPADM

## 2024-04-28 RX ORDER — ESCITALOPRAM OXALATE 10 MG/1
20 TABLET ORAL NIGHTLY
Status: DISCONTINUED | OUTPATIENT
Start: 2024-04-28 | End: 2024-04-29 | Stop reason: HOSPADM

## 2024-04-28 RX ORDER — SUMATRIPTAN SUCCINATE 25 MG/1
25 TABLET ORAL DAILY PRN
Status: DISCONTINUED | OUTPATIENT
Start: 2024-04-28 | End: 2024-04-29 | Stop reason: HOSPADM

## 2024-04-28 RX ORDER — EZETIMIBE 10 MG/1
10 TABLET ORAL DAILY
Status: DISCONTINUED | OUTPATIENT
Start: 2024-04-28 | End: 2024-04-29 | Stop reason: HOSPADM

## 2024-04-28 RX ORDER — LEVOTHYROXINE SODIUM 100 UG/1
100 TABLET ORAL DAILY
Status: DISCONTINUED | OUTPATIENT
Start: 2024-04-28 | End: 2024-04-29 | Stop reason: HOSPADM

## 2024-04-28 RX ORDER — HYDRALAZINE HYDROCHLORIDE 20 MG/ML
5 INJECTION INTRAMUSCULAR; INTRAVENOUS EVERY 4 HOURS PRN
Status: DISCONTINUED | OUTPATIENT
Start: 2024-04-28 | End: 2024-04-29 | Stop reason: HOSPADM

## 2024-04-28 RX ORDER — AMLODIPINE BESYLATE 5 MG/1
5 TABLET ORAL DAILY
Status: DISCONTINUED | OUTPATIENT
Start: 2024-04-28 | End: 2024-04-28

## 2024-04-28 RX ADMIN — OXYCODONE HYDROCHLORIDE AND ACETAMINOPHEN 1 TABLET: 10; 325 TABLET ORAL at 05:04

## 2024-04-28 RX ADMIN — ENOXAPARIN SODIUM 40 MG: 40 INJECTION SUBCUTANEOUS at 05:04

## 2024-04-28 RX ADMIN — EZETIMIBE 10 MG: 10 TABLET ORAL at 09:04

## 2024-04-28 RX ADMIN — OXYCODONE HYDROCHLORIDE AND ACETAMINOPHEN 1 TABLET: 10; 325 TABLET ORAL at 02:04

## 2024-04-28 RX ADMIN — LEVOTHYROXINE SODIUM 100 MCG: 100 TABLET ORAL at 09:04

## 2024-04-28 RX ADMIN — ONDANSETRON 4 MG: 2 INJECTION INTRAMUSCULAR; INTRAVENOUS at 06:04

## 2024-04-28 RX ADMIN — HYDRALAZINE HYDROCHLORIDE 25 MG: 25 TABLET ORAL at 01:04

## 2024-04-28 RX ADMIN — OXYCODONE HYDROCHLORIDE AND ACETAMINOPHEN 1 TABLET: 5; 325 TABLET ORAL at 05:04

## 2024-04-28 RX ADMIN — METHOCARBAMOL 750 MG: 750 TABLET ORAL at 09:04

## 2024-04-28 RX ADMIN — OXYCODONE HYDROCHLORIDE AND ACETAMINOPHEN 1 TABLET: 10; 325 TABLET ORAL at 09:04

## 2024-04-28 RX ADMIN — ESCITALOPRAM OXALATE 20 MG: 10 TABLET ORAL at 08:04

## 2024-04-28 RX ADMIN — ATORVASTATIN CALCIUM 40 MG: 40 TABLET, FILM COATED ORAL at 08:04

## 2024-04-28 RX ADMIN — METHOCARBAMOL 750 MG: 750 TABLET ORAL at 08:04

## 2024-04-28 RX ADMIN — METOPROLOL TARTRATE 25 MG: 25 TABLET, FILM COATED ORAL at 09:04

## 2024-04-28 RX ADMIN — POLYETHYLENE GLYCOL 3350 17 G: 17 POWDER, FOR SOLUTION ORAL at 09:04

## 2024-04-28 RX ADMIN — METOPROLOL TARTRATE 50 MG: 50 TABLET, FILM COATED ORAL at 08:04

## 2024-04-28 RX ADMIN — LOSARTAN POTASSIUM 50 MG: 50 TABLET, FILM COATED ORAL at 05:04

## 2024-04-28 RX ADMIN — ALPRAZOLAM 1 MG: 0.5 TABLET ORAL at 08:04

## 2024-04-28 RX ADMIN — LIOTHYRONINE SODIUM 5 MCG: 5 TABLET ORAL at 09:04

## 2024-04-28 RX ADMIN — HYDRALAZINE HYDROCHLORIDE 5 MG: 20 INJECTION INTRAMUSCULAR; INTRAVENOUS at 10:04

## 2024-04-28 NOTE — PROGRESS NOTES
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 States unable to ambulate with the Ex Fix and her plantar fascitis to her R foot.      Review of patient's allergies indicates:   Allergen Reactions    Azithromycin     Levofloxacin     Lisinopril Edema       Current Facility-Administered Medications   Medication Dose Route Frequency Provider Last Rate Last Admin    acetaminophen tablet 650 mg  650 mg Oral Q6H PRN Soren Gruber MD   650 mg at 04/27/24 1025    enoxaparin injection 40 mg  40 mg Subcutaneous Daily Hellen Alberts PA-C   40 mg at 04/27/24 1637    losartan tablet 50 mg  50 mg Oral Daily Soren Gruber MD   50 mg at 04/28/24 0507    methocarbamoL tablet 750 mg  750 mg Oral Q8H PRN Soren Gruber MD   750 mg at 04/27/24 2326    morphine injection 4 mg  4 mg Intravenous Q6H PRN Hellen Alberts PA-C   4 mg at 04/27/24 2134    ondansetron injection 4 mg  4 mg Intravenous Q6H PRN Hellen Alberts PA-C   4 mg at 04/27/24 2134    oxyCODONE-acetaminophen  mg per tablet 1 tablet  1 tablet Oral Q4H PRN Soren Gruber MD   1 tablet at 04/27/24 2326    oxyCODONE-acetaminophen 5-325 mg per tablet 1 tablet  1 tablet Oral Q4H  "Soren Diego MD   1 tablet at 04/28/24 0507    polyethylene glycol packet 17 g  17 g Oral Daily Hellen Alberts PA-C         Objective:     Vital Signs (Most Recent):  Temp: 99.3 °F (37.4 °C) (04/28/24 0030)  Pulse: 83 (04/28/24 0452)  Resp: 18 (04/28/24 0507)  BP: (!) 170/93 (04/28/24 0507)  SpO2: (!) 94 % (04/28/24 0030) Vital Signs (24h Range):  Temp:  [98.1 °F (36.7 °C)-101.1 °F (38.4 °C)] 99.3 °F (37.4 °C)  Pulse:  [76-98] 83  Resp:  [18-20] 18  SpO2:  [94 %-98 %] 94 %  BP: (151-198)/() 170/93     Weight: 98.2 kg (216 lb 7.9 oz)  Height: 5' 6" (167.6 cm)  Body mass index is 34.94 kg/m².      Intake/Output Summary (Last 24 hours) at 4/28/2024 0744  Last data filed at 4/28/2024 0527  Gross per 24 hour   Intake 600 ml   Output 4100 ml   Net -3500 ml       Physical Exam:   Ortho/SPM Exam  Musculoskeletal:                  LLE: -Skin: Dressing CDI, Ex Fix pins & bars intact            -MSK: EHL/FHL, Gastroc/Tib anterior Strength 5/5           -Neuro:  Sensation intact to light touch           -Lymphatic: No signs of lymphadenopathy           -CV: Capillary refill is less than 2 seconds. DP/PT pulses 2/4. Compartments soft and compressible                        Diagnostic Findings:   Significant Labs:   Recent Lab Results         04/28/24  0543        Albumin/Globulin Ratio 1.0       Albumin 3.4       ALP 90       ALT 10       AST 15       Baso # 0.04       Basophil % 0.4       BILIRUBIN TOTAL 0.3       BUN 5.8       Calcium 8.3       Chloride 102       CO2 26       Creatinine 0.82       eGFR >60       Eos # 0.65       Eos % 6.8       Globulin, Total 3.4       Glucose 147       Hematocrit 33.8       Hemoglobin 10.8       Immature Grans (Abs) 0.02       Immature Granulocytes 0.2       Lymph # 2.01       LYMPH % 21.0       MCH 28.3       MCHC 32.0       MCV 88.5       Mono # 0.94       Mono % 9.8       MPV 10.2       Neut # 5.90       Neut % 61.8       nRBC 0.0       Platelet Count 256       Potassium " 3.5       PROTEIN TOTAL 6.8       RBC 3.82       RDW 14.4       Sodium 136       WBC 9.56             All pertinent labs within the past 24 hours have been reviewed.  Recent Lab Results         04/28/24  0543        Albumin/Globulin Ratio 1.0       Albumin 3.4       ALP 90       ALT 10       AST 15       Baso # 0.04       Basophil % 0.4       BILIRUBIN TOTAL 0.3       BUN 5.8       Calcium 8.3       Chloride 102       CO2 26       Creatinine 0.82       eGFR >60       Eos # 0.65       Eos % 6.8       Globulin, Total 3.4       Glucose 147       Hematocrit 33.8       Hemoglobin 10.8       Immature Grans (Abs) 0.02       Immature Granulocytes 0.2       Lymph # 2.01       LYMPH % 21.0       MCH 28.3       MCHC 32.0       MCV 88.5       Mono # 0.94       Mono % 9.8       MPV 10.2       Neut # 5.90       Neut % 61.8       nRBC 0.0       Platelet Count 256       Potassium 3.5       PROTEIN TOTAL 6.8       RBC 3.82       RDW 14.4       Sodium 136       WBC 9.56                Significant Imaging: I have reviewed all pertinent imaging results/findings.     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 No ROM. Lovenox for DVT ppx while in house. Added Toradol to pain regimen to help with pain control. Continue to work with PT today for further evaluation and agree that pt is safe to d/c home.  Rx printed for patient. OK to resume PO anticoag once d/c.  HTN yesterday prior to d/c, although pt states this has been ongoing for some time and normally is elevated at home. Hospitalist consulted for HTN. Follow up with Dr Corona in the office x2 weeks for evaluation.     The above findings, diagnostics, and treatment plan were discussed with Dr. Soren Gruber who is  in agreement with the plan of care.        BOBBI Kamara-C  Orthopedic Surgery  [facility] - Ortho Neuro

## 2024-04-28 NOTE — PLAN OF CARE
Problem: Fall Injury Risk  Goal: Absence of Fall and Fall-Related Injury  Outcome: Progressing     Problem: Skin Injury Risk Increased  Goal: Skin Health and Integrity  Outcome: Progressing     Problem: Adult Inpatient Plan of Care  Goal: Plan of Care Review  Outcome: Progressing  Goal: Patient-Specific Goal (Individualized)  Outcome: Progressing  Goal: Absence of Hospital-Acquired Illness or Injury  Outcome: Progressing  Goal: Optimal Comfort and Wellbeing  Outcome: Progressing  Goal: Readiness for Transition of Care  Outcome: Progressing

## 2024-04-28 NOTE — CONSULTS
[facility]  Hospital Medicine Consultation Note        Patient Name: Briseyda Torres  MRN: [id number]  Patient Class: OP- Observation   Admission Date: 4/26/2024  5:30 AM  Length of Stay: 0  Attending Physician: [unfilled]   Primary Care Provider: Deo Jacobs MD  Face-to-Face encounter date: 04/28/2024 g  Consulting Physician: [facility] - Laureano Mei MD  Reason for Consult: Medical management of HTN  Chief Complaint: Post op        Patient information was obtained from patient, patient's family, past medical records.    HISTORY OF PRESENT ILLNESS:   Briseyda Torres is a 63 y.o. female with  has a past medical history of Acute deep vein thrombosis (DVT) of left lower extremity, unspecified vein (10/12/2023), Allergies, Anxiety, Arthritis, Closed displaced comminuted fracture of left patella, initial encounter, Closed displaced comminuted fracture of left patella, sequela, Digestive disorder, Fibromyalgia, HLD (hyperlipidemia), Hypertension, Insomnia, Migraine, Obesity, Painful orthopaedic hardware, Plantar fasciitis, right, SOB (shortness of breath), Stroke (2005), Systemic lupus erythematosus, organ or system involvement unspecified, and Thyroid disease..admitted to [facility] under Dr. 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  Upon my evaluation, patient is sitting in bed, reports her blood pressure has been running high.  Recently her PCP has increased her losartan from 25-50 mg daily.  Reported blood pressure persistently elevated then..  Patient reported allergy to amlodipine as it caused her lip and tongue swelling (not documented in the chart), discussed will add metoprolol to help control BP.  Patient reported she does not think she is  ready for discharge as she has been unable to get out of the bed.  Informed patient that was up to her primary service.  Informed I have resumed all her home medications  Patient reported she has not on any anticoagulation as it was taken off as of February 2024    PAST MEDICAL HISTORY:     Past Medical History:   Diagnosis Date    Acute deep vein thrombosis (DVT) of left lower extremity, unspecified vein 10/12/2023    pt stated not completely resolved however pcp stopped eliquis 1mo ago and dr kim will restart postop per pt    Allergies     Anxiety     Arthritis     Closed displaced comminuted fracture of left patella, initial encounter     Closed displaced comminuted fracture of left patella, sequela     Digestive disorder     acid reflux    Fibromyalgia     HLD (hyperlipidemia)     Hypertension     Insomnia     Migraine     Obesity     Painful orthopaedic hardware     Plantar fasciitis, right     SOB (shortness of breath)     Stroke 2005    Systemic lupus erythematosus, organ or system involvement unspecified     Thyroid disease        PAST SURGICAL HISTORY:     Past Surgical History:   Procedure Laterality Date    CATARACT EXTRACTION      CHOLECYSTECTOMY      COLONOSCOPY      ESOPHAGOGASTRODUODENOSCOPY      HYSTERECTOMY      INCISION AND DRAINAGE, LOWER EXTREMITY Left 11/23/2023    Procedure: INCISION AND DRAINAGE, LOWER EXTREMITY;  Surgeon: Curtis Kim MD;  Location: [facility];  Service: Orthopedics;  Laterality: Left;  I & D left knee for dehiscence/vascular table/bone foam/3L saline with cysto tubing/Vancomycin/Xperience irrigation/13cm Prevena    LUMBAR SPINE SURGERY      OPEN REDUCTION AND INTERNAL FIXATION (ORIF) OF FRACTURE OF PATELLA Left 10/27/2023    Procedure: ORIF, FRACTURE, PATELLA, LEFT;  Surgeon: Curtis Kim MD;  Location: [facility];  Service: Orthopedics;  Laterality: Left;  Supine, vascular bed, bone foam, tourniquet  1st case  Synthes small CCHS, 18g wire, locking patella plates     OPEN REDUCTION AND INTERNAL FIXATION (ORIF) OF FRACTURE OF PATELLA Left 4/9/2024    Procedure: REVISION ORIF, FRACTURE, PATELLA;  Surgeon: Curtis Corona MD;  Location: [facility];  Service: Orthopedics;  Laterality: Left;    ROTATOR CUFF REPAIR Bilateral     total of 4 surgeries    THYROIDECTOMY      TOTAL HIP ARTHROPLASTY Right     TOTAL KNEE ARTHROPLASTY Right     c       ALLERGIES:   Amlodipine, Azithromycin, Levofloxacin, and Lisinopril    FAMILY HISTORY:   Reviewed and negative    SOCIAL HISTORY:   Never smoked  Denied alcohol  On medical marijuana    HOME MEDICATIONS:     Prior to Admission medications    Medication Sig Start Date End Date Taking? Authorizing Provider   ALPRAZolam (XANAX) 1 MG tablet Take 1 mg by mouth every evening. 10/2/23  Yes Provider, Historical   EScitalopram oxalate (LEXAPRO) 10 MG tablet Take 20 mg by mouth every evening. 6/22/23  Yes Provider, Historical   estradioL (ESTRACE) 1 MG tablet Take 1 mg by mouth once daily. 2/7/24  Yes Provider, Historical   ezetimibe (ZETIA) 10 mg tablet Take 10 mg by mouth once daily.   Yes Provider, Historical   hydrOXYzine (ATARAX) 50 MG tablet Take 50 mg by mouth 3 (three) times daily as needed. 11/9/23  Yes Provider, Historical   levothyroxine (SYNTHROID) 100 MCG tablet Take 100 mcg by mouth once daily. 6/2/23  Yes Provider, Historical   liothyronine (CYTOMEL) 5 MCG Tab Take 5 mcg by mouth every morning. 6/5/23  Yes Provider, Historical   losartan (COZAAR) 50 MG tablet Take 50 mg by mouth every evening. 6/13/23  Yes Provider, Historical   methocarbamoL (ROBAXIN) 500 MG Tab Take 1 tablet (500 mg total) by mouth 3 (three) times daily. for 14 days 4/24/24 5/8/24 Yes Hellen Alberts PA-C   oxyCODONE-acetaminophen (PERCOCET)  mg per tablet Take 1 tablet by mouth every 6 (six) hours as needed for Pain. 4/24/24 5/1/24 Yes Hellen Alberts PA-C   promethazine (PHENERGAN) 25 MG tablet Take 25 mg by mouth every 6 (six) hours as needed for Nausea. 8/1/23   Yes Provider, Historical   rosuvastatin (CRESTOR) 40 MG Tab Take 40 mg by mouth once daily. 6/22/23  Yes Provider, Historical   sumatriptan (IMITREX) 25 MG Tab Take 1 tablet by mouth daily as needed for Migraine. 10/2/23  Yes Provider, Historical   ketorolac (TORADOL) 10 mg tablet Take 1 tablet (10 mg total) by mouth every 6 (six) hours. for 5 days 4/27/24 5/2/24  Nancy Hernandez FNP   methocarbamoL (ROBAXIN) 500 MG Tab Take 1 tablet (500 mg total) by mouth every 8 (eight) hours as needed. 4/27/24 5/7/24  Nancy Hernandez FNP   oxyCODONE (ROXICODONE) 10 mg Tab immediate release tablet Take 1 tablet (10 mg total) by mouth every 4 (four) hours as needed for Pain. 4/27/24 5/4/24  Nancy Hernandez FNP       REVIEW OF SYSTEMS:   Except as documented, all other systems reviewed and negative     PHYSICAL EXAM:     VITAL SIGNS: 24 HRS MIN & MAX LAST   Temp  Min: 98.1 °F (36.7 °C)  Max: 100.2 °F (37.9 °C) 99.3 °F (37.4 °C)   BP  Min: 151/74  Max: 198/101 (!) 170/93   Pulse  Min: 76  Max: 98  83   Resp  Min: 18  Max: 20 18   SpO2  Min: 94 %  Max: 98 % (!) 94 %       Vitals Reviewed  General appearance: Well-developed, well-nourished female in no apparent distress.  HENT: Atraumatic head. Moist mucous membranes of oral cavity.  Eyes: Normal extraocular movements.   Neck: Supple.   Lungs: Clear to auscultation bilaterally. No wheezing present.   Heart: Regular rate and rhythm. S1 and S2 present with no murmurs/gallop/rub. No pedal edema. No JVD present.   Abdomen: Soft, non-distended, non-tender. No rebound tenderness/guarding. Bowel sounds are normal.   Extremities: External fixator L leg  Skin: No Rash.   Neuro: Motor and sensory exams grossly intact. Good tone. Muscle strength 5/5 in all 4 extremities  Psych/mental status: Appropriate mood and affect. Responds appropriately to questions.     LABS AND IMAGING:     Recent Labs   Lab 04/28/24  0543   WBC 9.56   RBC 3.82*   HGB 10.8*   HCT 33.8*   MCV 88.5   MCH 28.3   MCHC  "32.0*   RDW 14.4      MPV 10.2       Recent Labs   Lab 04/28/24  0543      K 3.5   CO2 26   BUN 5.8*   CREATININE 0.82   CALCIUM 8.3*   ALBUMIN 3.4   ALKPHOS 90   ALT 10   AST 15   BILITOT 0.3        Microbiology Results (last 7 days)       ** No results found for the last 168 hours. **             ASSESSMENT & PLAN:   Medical management of hypertension in postop.  S/P ORIF left patellar fracture with Ex fixator application on 4/26 for Left closed displaced comminuted fracture of the patella with nonunion  Known Hx of DVT (off of OAC since 2/24 per patient), hyperlipidemia, lupus (untreated given patient refused treatment with Plaquenil as "it was killing me and turning me blind", fibromyalgia, history of CVA, migraine headaches, depression, anxiety     Continue losartan 50 mg daily, added metoprolol 25 mg b.i.d..  May increase to 50 if needed  Hydralazine 5 mg IV q.4 p.r.n. systolic blood pressure greater than 160  Patient reported allergic to amlodipine causing her angioedema, added to her chart  Orthopedic remains primary.  Continue postsurgical management per primary team  PT on board, recommended SNF  Continue management per Orthopedic    VTE Prophylaxis: Already on Lovenox    Anticipated discharge: Per Primary    Thank you for the consult, will follow along, if you have any questions, please do not hesitate to reach out to us, our team is available 24/7 to assist you    Non smoker    ________________________________________________________________________  INPATIENT LIST OF MEDICATIONS     Current Facility-Administered Medications:     acetaminophen tablet 650 mg, 650 mg, Oral, Q6H PRN, Soren Gruber MD, 650 mg at 04/27/24 1025    ALPRAZolam tablet 1 mg, 1 mg, Oral, QHS, Laureano Mei MD    atorvastatin tablet 40 mg, 40 mg, Oral, QHS, Laureano Mei MD    enoxaparin injection 40 mg, 40 mg, Subcutaneous, Daily, Hellen Alberts PA-C, 40 mg at 04/27/24 1637    EScitalopram oxalate tablet 20 mg, 20 " mg, Oral, QHS, Laureano Mei MD    ezetimibe tablet 10 mg, 10 mg, Oral, Daily, Laureano Mei MD, 10 mg at 04/28/24 0911    hydrALAZINE injection 5 mg, 5 mg, Intravenous, Q4H PRN, Laureano Mei MD    levothyroxine tablet 100 mcg, 100 mcg, Oral, Daily, Laureano Mei MD, 100 mcg at 04/28/24 0910    liothyronine tablet 5 mcg, 5 mcg, Oral, QAM, Laureano Mei MD, 5 mcg at 04/28/24 0910    losartan tablet 50 mg, 50 mg, Oral, Daily, Soren Gruber MD, 50 mg at 04/28/24 0507    methocarbamoL tablet 750 mg, 750 mg, Oral, Q8H PRN, Soren Gruber MD, 750 mg at 04/28/24 0931    metoprolol tartrate (LOPRESSOR) tablet 25 mg, 25 mg, Oral, BID, Laureano Mei MD, 25 mg at 04/28/24 0931    morphine injection 4 mg, 4 mg, Intravenous, Q6H PRN, Hellen Alberts PA-C, 4 mg at 04/27/24 2134    ondansetron injection 4 mg, 4 mg, Intravenous, Q6H PRN, Hellen Alberts PA-C, 4 mg at 04/27/24 2134    oxyCODONE-acetaminophen  mg per tablet 1 tablet, 1 tablet, Oral, Q4H PRN, Soren Gruber MD, 1 tablet at 04/28/24 0910    oxyCODONE-acetaminophen 5-325 mg per tablet 1 tablet, 1 tablet, Oral, Q4H PRN, Soren Gruber MD, 1 tablet at 04/28/24 0507    polyethylene glycol packet 17 g, 17 g, Oral, Daily, Hellen Alberts PA-C, 17 g at 04/28/24 0912    sumatriptan tablet 25 mg, 25 mg, Oral, Daily PRN, Laureano Mei MD    Scheduled Meds:  Current Facility-Administered Medications   Medication Dose Route Frequency    ALPRAZolam  1 mg Oral QHS    atorvastatin  40 mg Oral QHS    enoxaparin  40 mg Subcutaneous Daily    EScitalopram oxalate  20 mg Oral QHS    ezetimibe  10 mg Oral Daily    levothyroxine  100 mcg Oral Daily    liothyronine  5 mcg Oral QAM    losartan  50 mg Oral Daily    metoprolol tartrate  25 mg Oral BID    polyethylene glycol  17 g Oral Daily     Continuous Infusions:  Current Facility-Administered Medications   Medication Dose Route Frequency Last Rate Last Admin     PRN Meds:.  Current Facility-Administered Medications:      acetaminophen, 650 mg, Oral, Q6H PRN    hydrALAZINE, 5 mg, Intravenous, Q4H PRN    methocarbamoL, 750 mg, Oral, Q8H PRN    morphine, 4 mg, Intravenous, Q6H PRN    ondansetron, 4 mg, Intravenous, Q6H PRN    oxyCODONE-acetaminophen, 1 tablet, Oral, Q4H PRN    oxyCODONE-acetaminophen, 1 tablet, Oral, Q4H PRN    sumatriptan, 25 mg, Oral, Daily PRN    RADIOLOGY                                                                                                    X-Ray Knee 1 or 2 View Left  Narrative: EXAMINATION:  XR KNEE 1 OR 2 VIEW LEFT    CLINICAL HISTORY:  postop exam;    COMPARISON:  24 April 2024    FINDINGS:  Two views left knee.  Interval placement of external fixation hardware.  Interval partial patellectomy with reduction of the dislocated fracture.  Scattered soft tissue emphysema.  Impression: As above.    Electronically signed by: Germain Vaca  Date:    04/26/2024  Time:    10:13  SURG FL Surgery Fluoro Usage  See OP Notes for results.     IMPRESSION: See OP Notes for results.     This procedure was auto-finalized by: Virtual Radiologist      All diagnosis and differential diagnosis have been reviewed; assessment and plan has been documented; I have personally reviewed the labs and test results that are presently available; I have reviewed the patients medication list; I have reviewed the consulting providers response and recommendations. I have reviewed or attempted to review medical records based upon their availability.    All of the patient and family questions have been addressed and answered. Patient's is agreeable to the above stated plan. I will continue to monitor closely and make adjustments to medical management as needed.    Portions of this note dictated using EMR integrated voice recognition software, and may be subject to voice recognition errors not corrected at proofreading. Please contact writer for clarification if needed    Laureano Mei MD  Department of Hospital Medicine   [facility]   04/28/2024 10:14 AM

## 2024-04-29 VITALS
WEIGHT: 216.5 LBS | TEMPERATURE: 99 F | DIASTOLIC BLOOD PRESSURE: 81 MMHG | OXYGEN SATURATION: 95 % | HEART RATE: 74 BPM | SYSTOLIC BLOOD PRESSURE: 149 MMHG | RESPIRATION RATE: 20 BRPM | BODY MASS INDEX: 34.79 KG/M2 | HEIGHT: 66 IN

## 2024-04-29 PROCEDURE — 11000001 HC ACUTE MED/SURG PRIVATE ROOM

## 2024-04-29 PROCEDURE — 25000003 PHARM REV CODE 250: Performed by: INTERNAL MEDICINE

## 2024-04-29 PROCEDURE — 25000003 PHARM REV CODE 250: Performed by: ORTHOPAEDIC SURGERY

## 2024-04-29 PROCEDURE — 97530 THERAPEUTIC ACTIVITIES: CPT

## 2024-04-29 PROCEDURE — 97542 WHEELCHAIR MNGMENT TRAINING: CPT

## 2024-04-29 PROCEDURE — 25000003 PHARM REV CODE 250

## 2024-04-29 RX ORDER — DOCUSATE SODIUM 100 MG/1
100 CAPSULE, LIQUID FILLED ORAL 2 TIMES DAILY PRN
Status: DISCONTINUED | OUTPATIENT
Start: 2024-04-29 | End: 2024-04-29 | Stop reason: HOSPADM

## 2024-04-29 RX ADMIN — OXYCODONE HYDROCHLORIDE AND ACETAMINOPHEN 1 TABLET: 10; 325 TABLET ORAL at 08:04

## 2024-04-29 RX ADMIN — METHOCARBAMOL 750 MG: 750 TABLET ORAL at 08:04

## 2024-04-29 RX ADMIN — LIOTHYRONINE SODIUM 5 MCG: 5 TABLET ORAL at 06:04

## 2024-04-29 RX ADMIN — EZETIMIBE 10 MG: 10 TABLET ORAL at 08:04

## 2024-04-29 RX ADMIN — LOSARTAN POTASSIUM 50 MG: 50 TABLET, FILM COATED ORAL at 08:04

## 2024-04-29 RX ADMIN — SUMATRIPTAN SUCCINATE 25 MG: 25 TABLET ORAL at 04:04

## 2024-04-29 RX ADMIN — POLYETHYLENE GLYCOL 3350 17 G: 17 POWDER, FOR SOLUTION ORAL at 08:04

## 2024-04-29 RX ADMIN — LEVOTHYROXINE SODIUM 100 MCG: 100 TABLET ORAL at 08:04

## 2024-04-29 RX ADMIN — METOPROLOL TARTRATE 50 MG: 50 TABLET, FILM COATED ORAL at 08:04

## 2024-04-29 NOTE — PROGRESS NOTES
[facility]  Hospital Medicine Consultation follow up Note      Consultation follow up:  Inpatient Follow-up for HTN management    Reason for conuslt: Briseyda Torres is a 63 y.o. female with  has a past medical history of Acute deep vein thrombosis (DVT) of left lower extremity, unspecified vein (10/12/2023), Allergies, Anxiety, Arthritis, Closed displaced comminuted fracture of left patella, initial encounter, Closed displaced comminuted fracture of left patella, sequela, Digestive disorder, Fibromyalgia, HLD (hyperlipidemia), Hypertension, Insomnia, Migraine, Obesity, Painful orthopaedic hardware, Plantar fasciitis, right, SOB (shortness of breath), Stroke (2005), Systemic lupus erythematosus, organ or system involvement unspecified, and Thyroid disease..admitted to [facility] under Dr. 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  Upon my evaluation, patient is sitting in bed, reports her blood pressure has been running high.  Recently her PCP has increased her losartan from 25-50 mg daily.  Reported blood pressure persistently elevated then..  Patient reported allergy to amlodipine as it caused her lip and tongue swelling (not documented in the chart), discussed will add metoprolol to help control BP.  Patient reported she does not think she is ready for discharge as she has been unable to get out of the bed.  Informed patient that was up to her primary service.  Informed I have resumed all her home medications  Patient reported she has not on any anticoagulation as it was taken off as of February 2024    Interval Hx:   Patient is laying in bed, reports she feels better, stated happy with her blood pressure being down.  Ortho at bedside   Case was discussed with  "patient's nurse and  on the floor.    Objective/physical exam:  General: In no acute distress, afebrile  Chest: Clear to auscultation bilaterally  Heart: RRR, +S1, S2, no appreciable murmur  Abdomen: Soft, nontender, BS +  MSK: Warm, External fixator L leg   Neurologic: Alert and oriented x4, Cranial nerve II-XII intact, Strength 5/5 in all 3 extremity, limitation in movement of LLE given external fixator in place  extremities    VITAL SIGNS: 24 HRS MIN & MAX LAST   Temp  Min: 97.8 °F (36.6 °C)  Max: 99.6 °F (37.6 °C) 98.3 °F (36.8 °C)   BP  Min: 151/84  Max: 172/92 (!) 151/84   Pulse  Min: 61  Max: 86  61   Resp  Min: 16  Max: 20 16   SpO2  Min: 94 %  Max: 97 % 97 %     I reviewed the labs below:  Recent Labs   Lab 04/28/24  0543   WBC 9.56   RBC 3.82*   HGB 10.8*   HCT 33.8*   MCV 88.5   MCH 28.3   MCHC 32.0*   RDW 14.4      MPV 10.2     Recent Labs   Lab 04/28/24  0543      K 3.5   CO2 26   BUN 5.8*   CREATININE 0.82   CALCIUM 8.3*   ALBUMIN 3.4   ALKPHOS 90   ALT 10   AST 15   BILITOT 0.3     Microbiology Results (last 7 days)       ** No results found for the last 168 hours. **           See below for Radiology    Intake and Output:    Intake/Output Summary (Last 24 hours) at 4/29/2024 0701  Last data filed at 4/28/2024 1900  Gross per 24 hour   Intake --   Output 850 ml   Net -850 ml       Assessment/Plan:  Medical management of hypertension in postop.  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  BP much improved   Hydralazine 5 mg IV q.4 p.r.n. systolic blood pressure greater " than 160  Patient reported allergic to amlodipine causing her angioedema, allergy added to her chart  Orthopedic remains primary.  Continue postsurgical management per primary team  PT on board, recommended SNF  Continue management per Orthopedic     VTE Prophylaxis: Already on Lovenox     Anticipated discharge: Per Primary      Patient condition:  Stable    Thank you for the consult, will follow along iof pt remains in house       All diagnosis and differential diagnosis have been reviewed; assessment and plan has been documented; I have personally reviewed the labs and test results that are presently available; I have reviewed the patients medication list; I have reviewed the consulting providers response and recommendations. I have reviewed or attempted to review medical records based upon their availability    All of the patient's questions have been  addressed and answered. Patient's is agreeable to the above stated plan. I will continue to monitor closely and make adjustments to medical management as needed.    Portions of this note dictated using EMR integrated voice recognition software, and may be subject to voice recognition errors not corrected at proofreading. Please contact writer for clarification if needed.   _____________________________________________________________________    Nutrition Status:  -    Current Med:  Current Facility-Administered Medications   Medication Dose Route Frequency    ALPRAZolam  1 mg Oral QHS    atorvastatin  40 mg Oral QHS    enoxaparin  40 mg Subcutaneous Daily    EScitalopram oxalate  20 mg Oral QHS    ezetimibe  10 mg Oral Daily    levothyroxine  100 mcg Oral Daily    liothyronine  5 mcg Oral QAM    losartan  50 mg Oral Daily    metoprolol tartrate  50 mg Oral BID    polyethylene glycol  17 g Oral Daily      PRN meds:    Current Facility-Administered Medications:     acetaminophen, 650 mg, Oral, Q6H PRN    docusate sodium, 100 mg, Oral, BID PRN    hydrALAZINE, 5 mg,  Intravenous, Q4H PRN    methocarbamoL, 750 mg, Oral, Q8H PRN    morphine, 4 mg, Intravenous, Q6H PRN    ondansetron, 4 mg, Intravenous, Q6H PRN    oxyCODONE-acetaminophen, 1 tablet, Oral, Q4H PRN    oxyCODONE-acetaminophen, 1 tablet, Oral, Q4H PRN    sumatriptan, 25 mg, Oral, Daily PRN    Continuous infusion:  Current Facility-Administered Medications   Medication Dose Route Frequency Last Rate Last Admin        Radiology:   I have personally reviewed the images and agree with radiologist report  X-Ray Knee 1 or 2 View Left  Narrative: EXAMINATION:  XR KNEE 1 OR 2 VIEW LEFT    CLINICAL HISTORY:  postop exam;    COMPARISON:  24 April 2024    FINDINGS:  Two views left knee.  Interval placement of external fixation hardware.  Interval partial patellectomy with reduction of the dislocated fracture.  Scattered soft tissue emphysema.  Impression: As above.    Electronically signed by: Germain Vaca  Date:    04/26/2024  Time:    10:13  SURG FL Surgery Fluoro Usage  See OP Notes for results.     IMPRESSION: See OP Notes for results.     This procedure was auto-finalized by: Virtual Radiologist        Laureano Mei MD  Department of Hospital Medicine   [facility]   04/29/2024

## 2024-04-29 NOTE — PLAN OF CARE
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 Yes   Do you have help at home or someone to help you manage your care at home? Yes   Who are your caregiver(s) and their phone number(s)? mack de leon, spouse, [phone]   Prior to hospitilization cognitive status: Alert/Oriented   Current cognitive status: Alert/Oriented   Home Layout Able to live on 1st floor   Equipment Currently Used at Home bedside commode;wheelchair;shower chair   Readmission within 30 days? No   Patient currently being followed by outpatient case management? No   Do you currently have service(s) that help you manage your care at home? Yes   Name and Contact number of agency [facility]   Is the pt/caregiver preference to resume services with current agency Yes   Do you take prescription medications? Yes   Do you have prescription coverage? Yes   Coverage [employer], [employer] [place] [employer]   Do you have any problems affording any of your prescribed medications? No   Is the patient taking medications as prescribed? yes   Who is going to help you get home at discharge? family   How do you get to doctors appointments? family or friend will provide;car, drives self   Are you on dialysis? No   Do you take coumadin? No   Discharge Plan A Home Health   Discharge Plan B Home Health   DME Needed Upon Discharge  walker, rolling   Discharge Plan discussed with: Patient   Transition of Care Barriers None   Social Connections   Are you , , , , never , or living with a partner?    OTHER   Name(s) of People in Home spouse      Completed assessment with pt at bedside. Introduced self and explained role as SW. Pt verb understanding to all questions asked. PCP is Deo Jacobs MD and pharmacy is [facility] in [place]. Pt already on service with [facility] and wishes to resume. New order and updates sent via [employer]. RW ordered from [facility] to be delivered to pt's bedside prior to d/c.     Isidra Boyd LCSW

## 2024-04-29 NOTE — PROGRESS NOTES
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  She feels better compared to over the weekend.  States she has home health that comes to her house and she has a hospital bed at home.  Nursing staff reports no events overnight.  Pt resting comfortably and eating breakfast.     Review of patient's allergies indicates:   Allergen Reactions    Amlodipine Swelling     Per patient     Azithromycin     Levofloxacin     Lisinopril Edema       Current Facility-Administered Medications   Medication Dose Route Frequency Provider Last Rate Last Admin    acetaminophen tablet 650 mg  650 mg Oral Q6H PRN Soren Gruber MD   650 mg at 04/27/24 1025    ALPRAZolam tablet 1 mg  1 mg Oral QHS Laureano Mei MD   1 mg at 04/28/24 2054    atorvastatin tablet 40 mg  40 mg Oral QHS Laureano Mei MD   40 mg at 04/28/24 2055    docusate sodium capsule 100 mg  100 mg Oral BID PRN Edelmira Little, AGACNP-BC        enoxaparin injection 40 mg  40 mg Subcutaneous Daily Hellen Alberts PA-C   40 mg at 04/28/24 1755    EScitalopram oxalate tablet 20 mg  20 mg Oral QHS Laureano Mei MD   20 mg at 04/28/24 2055    ezetimibe tablet 10 mg  10 mg Oral Daily Laureano Mei MD   10 mg at 04/29/24 0845    hydrALAZINE injection 5 mg  5 mg Intravenous Q4H PRN Laureano Mei MD   5 mg at 04/28/24 1036    levothyroxine tablet 100 mcg  100 mcg Oral Daily Laureano Mei MD   100 mcg at  "04/29/24 0844    liothyronine tablet 5 mcg  5 mcg Oral QAM Laureano Mei MD   5 mcg at 04/29/24 0629    losartan tablet 50 mg  50 mg Oral Daily Soren Gruber MD   50 mg at 04/29/24 0845    methocarbamoL tablet 750 mg  750 mg Oral Q8H PRN Soren Gruber MD   750 mg at 04/29/24 0846    metoprolol tartrate (LOPRESSOR) tablet 50 mg  50 mg Oral BID Luareano Mei MD   50 mg at 04/29/24 0845    morphine injection 4 mg  4 mg Intravenous Q6H PRN Hellen Alberts PA-C   4 mg at 04/27/24 2134    ondansetron injection 4 mg  4 mg Intravenous Q6H PRN Hellen Alberts PA-C   4 mg at 04/28/24 1802    oxyCODONE-acetaminophen  mg per tablet 1 tablet  1 tablet Oral Q4H PRN Soren Gruber MD   1 tablet at 04/29/24 0844    oxyCODONE-acetaminophen 5-325 mg per tablet 1 tablet  1 tablet Oral Q4H PRN Soren Gruber MD   1 tablet at 04/28/24 0507    polyethylene glycol packet 17 g  17 g Oral Daily Hellen Alberts PA-C   17 g at 04/29/24 0845    sumatriptan tablet 25 mg  25 mg Oral Daily PRN Laureano Mei MD   25 mg at 04/29/24 0456     Objective:     Vital Signs (Most Recent):  Temp: 99.7 °F (37.6 °C) (04/29/24 0713)  Pulse: 78 (04/29/24 0845)  Resp: 18 (04/29/24 0844)  BP: (!) 168/75 (04/29/24 0845)  SpO2: 96 % (04/29/24 0713) Vital Signs (24h Range):  Temp:  [97.8 °F (36.6 °C)-99.7 °F (37.6 °C)] 99.7 °F (37.6 °C)  Pulse:  [61-86] 78  Resp:  [16-20] 18  SpO2:  [95 %-97 %] 96 %  BP: (151-170)/(75-94) 168/75     Weight: 98.2 kg (216 lb 7.9 oz)  Height: 5' 6" (167.6 cm)  Body mass index is 34.94 kg/m².      Intake/Output Summary (Last 24 hours) at 4/29/2024 0936  Last data filed at 4/29/2024 0744  Gross per 24 hour   Intake 480 ml   Output 1350 ml   Net -870 ml       Physical Exam:   Ortho/SPM Exam  Musculoskeletal:                  LLE: -Skin: Dressing CDI           -MSK: EHL/FHL, Gastroc/Tib anterior ROM intact.           -Neuro:  Sensation intact to light touch           -Lymphatic: No signs of lymphadenopathy           " -CV: Capillary refill is less than 2 seconds. DP/PT pulses palpbale. Compartments soft and compressible                        Diagnostic Findings:   Significant Labs:   Recent Lab Results       None          All pertinent labs within the past 24 hours have been reviewed.  Recent Lab Results       None             Significant Imaging: I have reviewed all pertinent imaging results/findings.     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 No ROM.   Lovenox for DVT ppx while in house.   OK to resume PO anticoag once d/c.   Able to d/c today.   Follow up with Dr Corona in the office x2 weeks for evaluation.   Wound care: may wash surgical sites once daily and as needed with antibacterial soap and water.  Sites are to remain clean and dry. Sutures and staples to be removed at f/u appt if incisions have healed completely.     The above findings, diagnostics, and treatment plan were discussed with Dr. Corona who is in agreement with the plan of care.      Hellen Alberts PA-C  Orthopedic Surgery  [facility] - Ortho Neuro

## 2024-04-29 NOTE — DISCHARGE INSTRUCTIONS
Ortho Follow up instructions  **All questions or concerns should be handled at your surgeon's office in follow up ([phone]).**    BLOOD CLOT PREVENTION:   Aspirin 81mg twice a day for 4 more weeks.     CONSTIPATION PREVENTION:   Miralax or Senokot S/Rox-colace and Stool softeners every day while on pain meds.  Use other MORE AGGRESSIVE over the counter LAXATIVES as needed for constipation (Examples - Milk of Magnesia, Dulcolax suppositories, Magnesium Citrate, Fleet's enemas...etc).  Drink lots of water  Increase Fiber in diet    ACTIVITY:   Weight bearing precautions as follows: weight bearing no weight on your left leg, elevate  Move around at least every 1-2 hours maintaining weight bearing precautions and switch positions throughout the day.     WOUND CARE:   start dressing change on 4/30. Change dressing every other day and as needed for soiling.   NOTIFY YOUR SURGEON OF EXCESSIVE WOUND DRAINAGE.  DO NOT apply any ointments, creams, lotions or antiseptics.  Clean daily around pin sites; dry gauze may be applied if drainage is present  May cover knee wound with soft dressing or large band aid. Keep clean and dry. No showers until post op day 7. Do not submerge. Do not apply ointments or creams to incisions.    FALL PREVENTION:  Wear sturdy shoes that fit well; walking around in bare feet, or only socks, can also increase your risk of falling.  Use walker or crutches as long as your surgeon recommend it  Use good lighting and  throw rugs, electrical cords, furniture and clutter (anything than can cause you to trip at home.)  Non-slip rug in bathroom or shower    PNEUMONIA PREVENTION:  Stay out of bed if possible, walk around at least every 2 hours maintaining any weight bearing precautions  Continue breathing exercises (Incentive Spirometry) as long as you are limited in mobility and on narcotics.     INFECTION PREVENTION:  Wound care instructions as above.   NOTIFY YOUR SURGEON OF EXCESSIVE WOUND  DRAINAGE.  Practice good handwashing before and after dressing changes.  No pets in bed or near wound (s)/dressing(s).  No Alcohol, smoking or tobacco products  IF YOU ARE DIABETIC, maintain good blood sugar control throughout your recovery.    Call your SURGEON'S OFFICE if you experience the following signs and symptoms of infection:   Unusual redness, swelling, excessive, cloudy or foul smelling drainage at the incision site.   Persistent temp greater than 101.5 F, unrelieved by Tylenol  Pain at surgical site, unrelieved by pain meds  Warning signs of a blood clot in your leg: (CALL YOUR DOCTOR)  New onset or Increasing pain in calf, new onset tenderness or redness above or below the knee or significant increasing swelling of your calf  Warning signs that a blood clot has traveled to your lungs: (REPORT TO THE ER)  Sudden or increase in Shortness of breath, sudden onset of chest pains, or  Localized chest pain with coughing.     For emergencies, please report to OUR ([facility] [place]) Emergency department

## 2024-04-29 NOTE — PT/OT/SLP PROGRESS
Physical Therapy Treatment and Discharge    Patient Name:  Briseyda Torres   MRN:  [id number]    Recommendations:     Discharge therapy intensity: No Therapy Indicated   Discharge Equipment Recommendations: walker, rolling  Barriers to discharge: None    Assessment:     Briseyda Torres is a 63 y.o. 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 Pt is allowed WBAT for stand and transfers only, no ROM or gait.  She presents with the following impairments/functional limitations: impaired functional mobility, decreased lower extremity function, orthopedic precautions. Pt with significant improvement in activity tolerance and able to complete stand transfers to  with SBA. Instructed on WC mobility with UE propulsion, completed 200ft with multiple turns, SBA. Pt safe for discharge to home. PT sign off as pt with no further therapy needs at this time, until further mobility allowed by orthopedics.    Rehab Prognosis: Good; patient would benefit from acute skilled PT services to address these deficits and reach maximum level of function.    Recent Surgery: Procedure(s) (LRB):  ORIF, FRACTURE, PATELLA WITH EX_FIX APPLICATION (Left) 3 Days Post-Op    Plan:     During this hospitalization, patient would benefit from acute PT services 5 x/week to address the identified rehab impairments via therapeutic activities, therapeutic exercises, wheelchair management/training and progress toward the following goals:    Plan of Care Expires:  05/25/24    Subjective     Chief Complaint: ready to go home  Patient/Family Comments/goals: return home  Pain/Comfort:  Pain Rating 1: 4/10  Location - Side 1: Left  Location 1: knee  Pain Addressed 1: Reposition      Objective:     Communicated with nurse prior to session.  Patient found HOB elevated with wound vac, peripheral IV (ex-fix) upon PT entry to room.     General Precautions: Standard,  fall  Orthopedic Precautions: LLE weight bearing as tolerated (for stand and transfer only, no ambulation)  Braces:  (exfix)  Respiratory Status: Room air  Skin Integrity:  ex-fix to LLE wrapped with ace bandage      Functional Mobility:  Bed Mobility:     Scooting: stand by assistance  Supine to Sit: stand by assistance  Transfers:     Sit to Stand:  stand by assistance with rolling walker  Bed to Chair: stand by assistance with  rolling walker  using  Step Transfer  Wheelchair Propulsion:  Pt propelled Standard wheelchair x 200 feet on Level tile with  Bilateral upper extremity with Stand-by Assistance.       Education:  Patient provided with verbal education and demonstrations education regarding post-op precautions and discharge/DME recommendations.  Understanding was verbalized.     Patient left up in chair with all lines intact, call button in reach, and nurse notified    GOALS:   Multidisciplinary Problems       Physical Therapy Goals       Not on file              Multidisciplinary Problems (Resolved)          Problem: Physical Therapy    Goal Priority Disciplines Outcome Goal Variances Interventions   Physical Therapy Goal   (Resolved)     PT, PT/OT Met     Description: Goals to be met by: d/c     Patient will increase functional independence with mobility by performin. Supine to sit with Stand-by Assistance  2. Sit to supine with Stand-by Assistance  3. Sit to stand transfer with Minimal Assistance  4. Bed to chair transfer with Stand-by Assistance using Slideboard  5. Wheelchair propulsion x100 feet with Supervision using bilateral uppper extremities                         Time Tracking:     PT Received On: 24  PT Start Time: 1012     PT Stop Time: 1045  PT Total Time (min): 33 min     Billable Minutes: Therapeutic Activity 15 and Train/Wheelchair Management 18    Treatment Type: Treatment  PT/PTA: PT     Number of PTA visits since last PT visit: 2024

## 2024-04-30 NOTE — DISCHARGE SUMMARY
Discharge Summary    Admit Date: 4/26/2024     Discharge Date: 4/30/2024     Operative Procedure: ORIF, FRACTURE, PATELLA WITH EX_FIX APPLICATION (Left: Knee)     History of Present Illness/Hospital Course:     Discharge Disposition: Home    Activity: NWB to affected extremity ; ROMAT    Diet: Resume previous home diet    Hospital Course. Patient tolerated procedure well. Was admitted for pain control and BP management. Prior to her DC home, pain was controlled with PO meds. She was without nausea. Tolerating PO and voiding. BP WNL. Will resume home anticoagulation at [place].     Medications:      Medication List        START taking these medications      ketorolac 10 mg tablet  Commonly known as: TORADOL  Take 1 tablet (10 mg total) by mouth every 6 (six) hours. for 5 days     oxyCODONE 10 mg Tab immediate release tablet  Commonly known as: ROXICODONE  Take 1 tablet (10 mg total) by mouth every 4 (four) hours as needed for Pain.            CONTINUE taking these medications      ALPRAZolam 1 MG tablet  Commonly known as: XANAX     EScitalopram oxalate 10 MG tablet  Commonly known as: LEXAPRO     estradioL 1 MG tablet  Commonly known as: ESTRACE     ezetimibe 10 mg tablet  Commonly known as: ZETIA     hydrOXYzine 50 MG tablet  Commonly known as: ATARAX     levothyroxine 100 MCG tablet  Commonly known as: SYNTHROID     liothyronine 5 MCG Tab  Commonly known as: CYTOMEL     losartan 50 MG tablet  Commonly known as: COZAAR     * methocarbamoL 500 MG Tab  Commonly known as: ROBAXIN  Take 1 tablet (500 mg total) by mouth 3 (three) times daily. for 14 days     * methocarbamoL 500 MG Tab  Commonly known as: ROBAXIN  Take 1 tablet (500 mg total) by mouth every 8 (eight) hours as needed.     oxyCODONE-acetaminophen  mg per tablet  Commonly known as: PERCOCET  Take 1 tablet by mouth every 6 (six) hours as needed for Pain.     promethazine 25 MG tablet  Commonly known as: PHENERGAN     rosuvastatin 40 MG Tab  Commonly known  as: CRESTOR     sumatriptan 25 MG Tab  Commonly known as: IMITREX           * This list has 2 medication(s) that are the same as other medications prescribed for you. Read the directions carefully, and ask your doctor or other care provider to review them with you.                STOP taking these medications      ELIQUIS 5 mg Tab  Generic drug: apixaban     EPINEPHrine 0.3 mg/0.3 mL Atin  Commonly known as: EPIPEN     WOMEN'S 50 PLUS MULTIVIT-IRON ORAL               Where to Get Your Medications        You can get these medications from any pharmacy    Bring a paper prescription for each of these medications  ketorolac 10 mg tablet  methocarbamoL 500 MG Tab  oxyCODONE 10 mg Tab immediate release tablet          Discharge Instructions: daily dry dressing changes until follow up. Keep incisions clean and dry. Do not apply ointments or creams. Pin care as intructed.     Follow Up: With Dr Corona in approx 3 weeks    The above findings, diagnostics, and treatment plan were discussed with Dr Corona who is in agreement with the plan of care except as stated in additional documentation.     Soha Denis, FNP [facility]   Orthopedic Trauma

## 2024-05-01 ENCOUNTER — PATIENT MESSAGE (OUTPATIENT)
Dept: ORTHOPEDICS | Facility: CLINIC | Age: 64
End: 2024-05-01
Payer: MEDICARE

## 2024-05-02 DIAGNOSIS — T84.84XA PAINFUL ORTHOPAEDIC HARDWARE: Primary | ICD-10-CM

## 2024-05-02 DIAGNOSIS — T81.31XA SURGICAL WOUND DEHISCENCE, INITIAL ENCOUNTER: ICD-10-CM

## 2024-05-07 DIAGNOSIS — S82.042K CLOSED DISPLACED COMMINUTED FRACTURE OF LEFT PATELLA WITH NONUNION: ICD-10-CM

## 2024-05-07 RX ORDER — OXYCODONE AND ACETAMINOPHEN 10; 325 MG/1; MG/1
1 TABLET ORAL EVERY 6 HOURS PRN
Qty: 28 TABLET | Refills: 0 | Status: SHIPPED | OUTPATIENT
Start: 2024-05-07 | End: 2024-05-16 | Stop reason: SDUPTHER

## 2024-05-08 ENCOUNTER — HOSPITAL ENCOUNTER (OUTPATIENT)
Dept: RADIOLOGY | Facility: CLINIC | Age: 64
Discharge: HOME | End: 2024-05-08
Attending: ORTHOPAEDIC SURGERY
Payer: MEDICARE

## 2024-05-08 ENCOUNTER — OFFICE VISIT (OUTPATIENT)
Dept: ORTHOPEDICS | Facility: CLINIC | Age: 64
End: 2024-05-08
Payer: MEDICARE

## 2024-05-08 VITALS
BODY MASS INDEX: 34.72 KG/M2 | HEIGHT: 66 IN | DIASTOLIC BLOOD PRESSURE: 84 MMHG | HEART RATE: 76 BPM | SYSTOLIC BLOOD PRESSURE: 134 MMHG | WEIGHT: 216.06 LBS

## 2024-05-08 DIAGNOSIS — S82.042K CLOSED DISPLACED COMMINUTED FRACTURE OF LEFT PATELLA WITH NONUNION: Primary | ICD-10-CM

## 2024-05-08 DIAGNOSIS — S82.042K CLOSED DISPLACED COMMINUTED FRACTURE OF LEFT PATELLA WITH NONUNION: ICD-10-CM

## 2024-05-08 PROCEDURE — 73560 X-RAY EXAM OF KNEE 1 OR 2: CPT | Mod: LT,,, | Performed by: ORTHOPAEDIC SURGERY

## 2024-05-08 PROCEDURE — 99024 POSTOP FOLLOW-UP VISIT: CPT | Mod: POP,,,

## 2024-05-09 NOTE — PROGRESS NOTES
[facility] Tra[facility]      Name: Briseyda Torres  : 1960  MRN: [id number]  Date: 2024    Chief Complaint   Patient presents with    Post-op Evaluation     12 days s/p, Lt patella ORIF, Ex fix sx 24, Remains NWB. Reports constant pain and discomfort around pin site.        Subjective:       Patient ID: Briseyda Torres is a 64 y.o. female.    Chief Complaint   Patient presents with    Post-op Evaluation     12 days s/p, Lt patella ORIF, Ex fix sx 24, Remains NWB. Reports constant pain and discomfort around pin site.         HPI  64-year-old female presents to clinic for 2 week follow-up status post left patella open reduction internal fixation and external fixation device placement.  She remains nonweightbearing.  Reports constant pain and discomfort around femoral pin sites.  States she is doing well with transfers and has not had any new injuries.  She does have physical therapy and occupational therapy coming to her house for home health but does not feel it is beneficial at this time given her decreased activity.  She would like to resume physical therapy and occupational therapy when she is able to perform more activities.  No other complaints at this time.  Her  is present at bedside today.    ROS:  Constitutional: Denies fever chills  MSK: Pain evident at site of injury located in HPI,   Integ: No signs of abrasions or lacerations  Neuro: No numbness or tingling  Lymphatic: No swelling outside the area of injury     Current Outpatient Medications   Medication Instructions    ALPRAZolam (XANAX) 1 mg, Oral, Nightly    EScitalopram oxalate (LEXAPRO) 20 mg, Oral, Nightly    estradioL (ESTRACE) 1 mg, Oral, Daily    ezetimibe (ZETIA) 10 mg, Oral, Daily    hydrOXYzine (ATARAX) 50 mg, Oral, 3 times daily PRN    levothyroxine (SYNTHROID) 100 mcg, Oral, Daily    liothyronine (CYTOMEL) 5 mcg, Oral, Every morning    losartan (COZAAR) 50 mg, Oral, Nightly    methocarbamoL  "(ROBAXIN) 500 mg, Oral, 3 times daily    oxyCODONE-acetaminophen (PERCOCET)  mg per tablet 1 tablet, Oral, Every 6 hours PRN    promethazine (PHENERGAN) 25 mg, Oral, Every 6 hours PRN    rosuvastatin (CRESTOR) 40 mg, Oral, Daily    sumatriptan (IMITREX) 25 MG Tab 1 tablet, Oral, Daily PRN          Objective:      Visit Vitals  /84   Pulse 76   Ht 5' 6" (1.676 m)   Wt 98 kg (216 lb 0.8 oz)   BMI 34.87 kg/m²     Physical Exam    General the patient is alert and oriented x3 no acute distress nontoxic-appearing appropriate affect.    Constitutional: Vital signs are examined and stable.  Resp: No signs of labored breathing                          RLE: -Skin:  Incisions are well healed with staples and sutures in place.  Minimal serous drainage from femur pin sites.  Tibia pin sites dry without drainage.  No evidence of infection or wound dehiscence.           -MSK: : Hip f/e, EHL/FHL, Gastroc/Tib anterior motor intact.  Knee in extension due to external fixation.           -Neuro:  Sensation intact to light touch L3-S1 dermatomes           -Lymphatic:  Nonpitting edema noted to anterior knee           -CV:  Palpable posterior tibialis pulse. Compartments soft and compressible.          Body mass index is 34.87 kg/m².  Ideal body weight: 59.3 kg (130 lb 11.7 oz)  Adjusted ideal body weight: 74.8 kg (164 lb 13.8 oz)  Hgb   Date Value Ref Range Status   04/28/2024 10.8 (L) 12.0 - 16.0 g/dL Final   04/05/2024 12.9 12.0 - 16.0 g/dL Final     Hct   Date Value Ref Range Status   04/28/2024 33.8 (L) 37.0 - 47.0 % Final   04/05/2024 40.3 37.0 - 47.0 % Final     No results found for: "[id number]"  WBC   Date Value Ref Range Status   04/28/2024 9.56 4.50 - 11.50 x10(3)/mcL Final   04/05/2024 7.12 4.50 - 11.50 x10(3)/mcL Final       Radiology:   X-ray left knee demonstrates inferior pole fracture of patella seen again.  External fixation hardware intact.        Assessment:           ICD-10-CM ICD-9-CM   1. 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    1. 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  Pin sites appear well with some serous drainage at pin sites of femur pins.  Steri-Strips placed over incisions.  Patient to return to clinic in 2 weeks to discuss returning to the operating room for external fixation removal.    Pt verbalizes understanding and agrees with tx plan. Pt to call clinic with any questions/concerns.      The above findings, diagnostics, and treatment plan were discussed with Dr. Corona who is in agreement with the plan of care except as stated in additional documentation.     Ashley Gunther, PA-C [facility]        Future Appointments   Date Time Provider Department Center   5/22/2024  9:30 AM Curtis Corona MD [facility] [place]

## 2024-05-10 ENCOUNTER — TELEPHONE (OUTPATIENT)
Dept: ORTHOPEDICS | Facility: CLINIC | Age: 64
End: 2024-05-10
Payer: MEDICARE

## 2024-05-10 NOTE — TELEPHONE ENCOUNTER
Patient calls in reporting increased pain to left leg.  Minimal swelling, states it starts under foot area, going up the leg.  Instructed to report to nearest ER immediately, verbalized understanding.

## 2024-05-13 LAB — FUNGUS SPEC CULT: NORMAL

## 2024-05-15 ENCOUNTER — HOSPITAL ENCOUNTER (EMERGENCY)
Facility: HOSPITAL | Age: 64
Discharge: HOME | End: 2024-05-16
Attending: FAMILY MEDICINE
Payer: MEDICARE

## 2024-05-15 DIAGNOSIS — L29.9 PRURITUS: Primary | ICD-10-CM

## 2024-05-15 PROCEDURE — 99284 EMERGENCY DEPT VISIT MOD MDM: CPT

## 2024-05-16 VITALS
WEIGHT: 203 LBS | DIASTOLIC BLOOD PRESSURE: 77 MMHG | RESPIRATION RATE: 18 BRPM | TEMPERATURE: 98 F | BODY MASS INDEX: 32.62 KG/M2 | SYSTOLIC BLOOD PRESSURE: 142 MMHG | OXYGEN SATURATION: 98 % | HEIGHT: 66 IN | HEART RATE: 84 BPM

## 2024-05-16 DIAGNOSIS — S82.042K CLOSED DISPLACED COMMINUTED FRACTURE OF LEFT PATELLA WITH NONUNION: ICD-10-CM

## 2024-05-16 PROCEDURE — 96372 THER/PROPH/DIAG INJ SC/IM: CPT | Performed by: FAMILY MEDICINE

## 2024-05-16 PROCEDURE — 63600175 PHARM REV CODE 636 W HCPCS: Performed by: FAMILY MEDICINE

## 2024-05-16 RX ORDER — HYDROXYZINE HYDROCHLORIDE 25 MG/1
50 TABLET, FILM COATED ORAL 3 TIMES DAILY PRN
Qty: 30 TABLET | Refills: 0 | Status: ON HOLD | OUTPATIENT
Start: 2024-05-16 | End: 2024-05-29 | Stop reason: HOSPADM

## 2024-05-16 RX ORDER — TRIAMCINOLONE ACETONIDE 5 MG/G
CREAM TOPICAL 2 TIMES DAILY
Qty: 30 G | Refills: 0 | Status: SHIPPED | OUTPATIENT
Start: 2024-05-16

## 2024-05-16 RX ORDER — DIPHENHYDRAMINE HYDROCHLORIDE 50 MG/ML
50 INJECTION INTRAMUSCULAR; INTRAVENOUS ONCE
Status: COMPLETED | OUTPATIENT
Start: 2024-05-16 | End: 2024-05-16

## 2024-05-16 RX ORDER — OXYCODONE AND ACETAMINOPHEN 10; 325 MG/1; MG/1
1 TABLET ORAL EVERY 8 HOURS PRN
Qty: 21 TABLET | Refills: 0 | Status: SHIPPED | OUTPATIENT
Start: 2024-05-16 | End: 2024-05-27 | Stop reason: SDUPTHER

## 2024-05-16 RX ADMIN — DIPHENHYDRAMINE HYDROCHLORIDE 50 MG: 50 INJECTION INTRAMUSCULAR; INTRAVENOUS at 12:05

## 2024-05-16 NOTE — ED PROVIDER NOTES
Encounter Date: 5/15/2024       History     Chief Complaint   Patient presents with    Itching     Systemic itching chronic x 3-4 years - being seen by Endocrinologist, following Thyroid and elevated LFT's- itching has been worse x 3 days with pt unable to sleep  - no SOB, no new c/o - left knee sx 2 weeks PTA with external fixation in place, midline knee incision closed with Steristrips, without redness or edema, no drainage     Patient presents for evaluation of pruritus.  Patient notes having chronic pruritus for years.  Patient notes symptoms have been more intense over the past several days.  Patient states he is currently taking opiates for postsurgical pain.  Patient denies having any other associated symptoms at present.  Patient denies having any aggravating or relieving features at present.    The history is provided by the patient.     Review of patient's allergies indicates:   Allergen Reactions    Amlodipine Swelling     Per patient     Azithromycin     Levofloxacin     Lisinopril Edema     Past Medical History:   Diagnosis Date    Acute deep vein thrombosis (DVT) of left lower extremity, unspecified vein 10/12/2023    pt stated not completely resolved however pcp stopped eliquis 1mo ago and dr kim will restart postop per pt    Allergies     Anxiety     Arthritis     Closed displaced comminuted fracture of left patella, initial encounter     Closed displaced comminuted fracture of left patella, sequela     Digestive disorder     acid reflux    Fibromyalgia     HLD (hyperlipidemia)     Hypertension     Insomnia     Migraine     Obesity     Painful orthopaedic hardware     Plantar fasciitis, right     SOB (shortness of breath)     Stroke 2005    Systemic lupus erythematosus, organ or system involvement unspecified     Thyroid disease      Past Surgical History:   Procedure Laterality Date    CATARACT EXTRACTION      CHOLECYSTECTOMY      COLONOSCOPY      ESOPHAGOGASTRODUODENOSCOPY      HYSTERECTOMY       "INCISION AND DRAINAGE, LOWER EXTREMITY Left 11/23/2023    Procedure: INCISION AND DRAINAGE, LOWER EXTREMITY;  Surgeon: Curtis Corona MD;  Location: [facility] OR;  Service: Orthopedics;  Laterality: Left;  I & D left knee for dehiscence/vascular table/bone foam/3L saline with cysto tubing/Vancomycin/Xperience irrigation/13cm Prevena    LUMBAR SPINE SURGERY      OPEN REDUCTION AND INTERNAL FIXATION (ORIF) OF FRACTURE OF PATELLA Left 10/27/2023    Procedure: ORIF, FRACTURE, PATELLA, LEFT;  Surgeon: Curtis Corona MD;  Location: [facility] OR;  Service: Orthopedics;  Laterality: Left;  Supine, vascular bed, bone foam, tourniquet  1st case  Synthes small CCHS, 18g wire, locking patella plates    OPEN REDUCTION AND INTERNAL FIXATION (ORIF) OF FRACTURE OF PATELLA Left 4/9/2024    Procedure: REVISION ORIF, FRACTURE, PATELLA;  Surgeon: Curtis Corona MD;  Location: [facility] OR;  Service: Orthopedics;  Laterality: Left;    OPEN REDUCTION AND INTERNAL FIXATION (ORIF) OF FRACTURE OF PATELLA Left 4/26/2024    Procedure: ORIF, FRACTURE, PATELLA WITH EX_FIX APPLICATION;  Surgeon: Curtis Corona MD;  Location: [facility] OR;  Service: Orthopedics;  Laterality: Left;  Supine, vascular bed, bone foam, tourniquet  Removal of CCHS screws and application synthes ex fix  #5 fiberwire x 3, hewson suture passer and drill    ROTATOR CUFF REPAIR Bilateral     total of 4 surgeries    THYROIDECTOMY      TOTAL HIP ARTHROPLASTY Right     TOTAL KNEE ARTHROPLASTY Right     c     Family History   Problem Relation Name Age of Onset    Diabetes Mother      Cancer Mother      Heart attack Mother      Heart attack Father      Diabetes Father      Cancer Father       Social History     Tobacco Use    Smoking status: Never    Smokeless tobacco: Never   Substance Use Topics    Alcohol use: Never    Drug use: Yes     Types: Marijuana     Comment: "medical marijuana"     Review of Systems   Constitutional: Negative.    HENT: Negative.     Eyes: Negative.  "   Respiratory: Negative.     Cardiovascular: Negative.    Gastrointestinal: Negative.    Endocrine: Negative.    Genitourinary: Negative.    Musculoskeletal: Negative.    Skin: Negative.         Pruritus   Allergic/Immunologic: Negative.    Neurological: Negative.    Hematological: Negative.    Psychiatric/Behavioral: Negative.         Physical Exam     Initial Vitals [05/15/24 2349]   BP Pulse Resp Temp SpO2   138/81 90 20 98.1 °F (36.7 °C) 98 %      MAP       --         Physical Exam    Vitals reviewed.  Constitutional: She appears well-developed and well-nourished. She is not diaphoretic. No distress.   HENT:   Head: Normocephalic and atraumatic.   Mouth/Throat: No oropharyngeal exudate.   Eyes: EOM are normal. Pupils are equal, round, and reactive to light. Right eye exhibits no discharge. Left eye exhibits no discharge.   Neck: Neck supple. No thyromegaly present. No tracheal deviation present. No JVD present.   Normal range of motion.  Cardiovascular:  Normal rate, regular rhythm, normal heart sounds and intact distal pulses.     Exam reveals no gallop and no friction rub.       No murmur heard.  Pulmonary/Chest: Breath sounds normal. No stridor. No respiratory distress. She has no wheezes. She has no rhonchi. She has no rales.   Abdominal: Abdomen is soft. Bowel sounds are normal. She exhibits no distension. There is no abdominal tenderness. There is no rebound and no guarding.   Musculoskeletal:         General: No tenderness or edema. Normal range of motion.      Cervical back: Normal range of motion and neck supple.     Neurological: She is alert and oriented to person, place, and time. She has normal reflexes. She displays normal reflexes. No cranial nerve deficit. GCS score is 15. GCS eye subscore is 4. GCS verbal subscore is 5. GCS motor subscore is 6.   Skin:   Urticarial type rash from arms.   Psychiatric: She has a normal mood and affect.         ED Course   Procedures  Labs Reviewed - No data to  display       Imaging Results    None          Medications   diphenhydrAMINE injection 50 mg (has no administration in time range)     Medical Decision Making  Risk  Prescription drug management.                                      Clinical Impression:  Final diagnoses:  [L29.9] Pruritus (Primary)          ED Disposition Condition    Discharge Stable          ED Prescriptions       Medication Sig Dispense Start Date End Date Auth. Provider    hydrOXYzine HCL (ATARAX) 25 MG tablet Take 2 tablets (50 mg total) by mouth 3 (three) times daily as needed for Itching. 30 tablet 5/16/2024 -- Trung Mcconnell MD    triamcinolone acetonide 0.5% (KENALOG) 0.5 % Crea Apply topically 2 (two) times daily. 30 g 5/16/2024 -- Trung Mcconnell MD          Follow-up Information    None          Trung Mcconnell MD  05/16/24 0013

## 2024-05-22 ENCOUNTER — OFFICE VISIT (OUTPATIENT)
Dept: ORTHOPEDICS | Facility: CLINIC | Age: 64
End: 2024-05-22
Payer: MEDICARE

## 2024-05-22 VITALS
SYSTOLIC BLOOD PRESSURE: 139 MMHG | HEART RATE: 85 BPM | BODY MASS INDEX: 32.59 KG/M2 | DIASTOLIC BLOOD PRESSURE: 78 MMHG | HEIGHT: 66 IN | WEIGHT: 202.81 LBS

## 2024-05-22 DIAGNOSIS — S82.042K CLOSED DISPLACED COMMINUTED FRACTURE OF LEFT PATELLA WITH NONUNION: Primary | ICD-10-CM

## 2024-05-22 PROCEDURE — 99024 POSTOP FOLLOW-UP VISIT: CPT | Mod: POP,,,

## 2024-05-22 RX ORDER — MUPIROCIN 20 MG/G
OINTMENT TOPICAL
Status: CANCELLED | OUTPATIENT
Start: 2024-05-22

## 2024-05-22 NOTE — H&P (VIEW-ONLY)
[facility]      Name: Briseyda Torres  : 1960  MRN: [id number]  Date: 2024    Chief Complaint   Patient presents with    Left Knee - Follow-up     3.5 WEEK F/U FROM EX FIX APPLICATION LEFT PATELLA. REPORTS ACHING PAIN.        Subjective:       Patient ID: Briseyda Torres is a 64 y.o. female.    Chief Complaint   Patient presents with    Left Knee - Follow-up     3.5 WEEK F/U FROM EX FIX APPLICATION LEFT PATELLA. REPORTS ACHING PAIN.         HPI  Pt presents fo 3.5 wk f/u of ex fix application for comminuted left patella fracture with nonunion.  She has aching pain that is better with medications.  Has been resting in bed mostly.  Avoiding re-injury/falls.  Has been washing pin sites with dial soap and water.  Her  is present today.      ROS:  Cardiopulm: No CP or difficulty breathing  Constitutional: Denies fever chills  MSK: Pain evident at site of injury located in HPI,   Integ: No signs of abrasions or lacerations  Neuro: No numbness or tingling  Lymphatic: No swelling outside the area of injury     Current Outpatient Medications   Medication Instructions    ALPRAZolam (XANAX) 1 mg, Oral, Nightly    EScitalopram oxalate (LEXAPRO) 20 mg, Oral, Nightly    estradioL (ESTRACE) 1 mg, Oral, Daily    ezetimibe (ZETIA) 10 mg, Oral, Daily    hydrOXYzine (ATARAX) 50 mg, Oral, 3 times daily PRN    hydrOXYzine HCL (ATARAX) 50 mg, Oral, 3 times daily PRN    levothyroxine (SYNTHROID) 100 mcg, Oral, Daily    liothyronine (CYTOMEL) 5 mcg, Oral, Every morning    losartan (COZAAR) 50 mg, Oral, Nightly    oxyCODONE-acetaminophen (PERCOCET)  mg per tablet 1 tablet, Oral, Every 8 hours PRN    promethazine (PHENERGAN) 25 mg, Oral, Every 6 hours PRN    rosuvastatin (CRESTOR) 40 mg, Oral, Daily    sumatriptan (IMITREX) 25 MG Tab 1 tablet, Oral, Daily PRN    triamcinolone acetonide 0.5% (KENALOG) 0.5 % Crea Topical (Top), 2 times daily          Objective:      Visit Vitals  /78  "  Pulse 85   Ht 5' 6" (1.676 m)   Wt 92 kg (202 lb 13.2 oz)   BMI 32.74 kg/m²     Physical Exam    General the patient is alert and oriented x3 no acute distress nontoxic-appearing appropriate affect.    Constitutional: Vital signs are examined and stable.  Resp: No signs of labored breathing               LLE: -Skin: incision well healed without drainage or signs of infection.  Overlying butterfly strips noted.  No evidence of dehiscence.  Pin sites of femur with small amount of serous drainage.  Tibia pin sites dry.            -MSK: digits and ankle with intact gross motor.             -Neuro:  Sensation intact to light touch L3-S1 dermatomes           -Lymphatic: No signs of lymphadenopathy           -CV: Palpable PT and DP pulse. Compartments soft and compressible                        Body mass index is 32.74 kg/m².  Ideal body weight: 59.3 kg (130 lb 11.7 oz)  Adjusted ideal body weight: 72.4 kg (159 lb 9.1 oz)  Hgb   Date Value Ref Range Status   04/28/2024 10.8 (L) 12.0 - 16.0 g/dL Final   04/05/2024 12.9 12.0 - 16.0 g/dL Final     Hct   Date Value Ref Range Status   04/28/2024 33.8 (L) 37.0 - 47.0 % Final   04/05/2024 40.3 37.0 - 47.0 % Final     No results found for: "[id number]"  WBC   Date Value Ref Range Status   04/28/2024 9.56 4.50 - 11.50 x10(3)/mcL Final   04/05/2024 7.12 4.50 - 11.50 x10(3)/mcL Final             Assessment:           ICD-10-CM ICD-9-CM   1. 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    1. 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  Cont local wound care.  Consents signed today.  Allowed time for questions.  Questions answered to pt satisfaction.  Pt verbalizes understanding and agrees with tx plan.  Pt verbalizes understanding and agrees with tx plan. Pt to call clinic with any questions/concerns.      The above findings, diagnostics, and treatment plan were discussed with Dr. Corona who is in agreement with the plan of care except as stated in additional documentation.     Hellen Alberts PA-C  [facility]        No future appointments.       This note was created with the assistance of voice recognition software or phone dictation. There may be transcription errors as a result of using this technology however minimal. Effort has been made to assure accuracy of transcription but any obvious errors or omissions should be clarified with the author of the document.

## 2024-05-22 NOTE — PROGRESS NOTES
[facility]      Name: Briseyda Torres  : 1960  MRN: [id number]  Date: 2024    Chief Complaint   Patient presents with    Left Knee - Follow-up     3.5 WEEK F/U FROM EX FIX APPLICATION LEFT PATELLA. REPORTS ACHING PAIN.        Subjective:       Patient ID: Briseyda Torres is a 64 y.o. female.    Chief Complaint   Patient presents with    Left Knee - Follow-up     3.5 WEEK F/U FROM EX FIX APPLICATION LEFT PATELLA. REPORTS ACHING PAIN.         HPI  Pt presents fo 3.5 wk f/u of ex fix application for comminuted left patella fracture with nonunion.  She has aching pain that is better with medications.  Has been resting in bed mostly.  Avoiding re-injury/falls.  Has been washing pin sites with dial soap and water.  Her  is present today.      ROS:  Cardiopulm: No CP or difficulty breathing  Constitutional: Denies fever chills  MSK: Pain evident at site of injury located in HPI,   Integ: No signs of abrasions or lacerations  Neuro: No numbness or tingling  Lymphatic: No swelling outside the area of injury     Current Outpatient Medications   Medication Instructions    ALPRAZolam (XANAX) 1 mg, Oral, Nightly    EScitalopram oxalate (LEXAPRO) 20 mg, Oral, Nightly    estradioL (ESTRACE) 1 mg, Oral, Daily    ezetimibe (ZETIA) 10 mg, Oral, Daily    hydrOXYzine (ATARAX) 50 mg, Oral, 3 times daily PRN    hydrOXYzine HCL (ATARAX) 50 mg, Oral, 3 times daily PRN    levothyroxine (SYNTHROID) 100 mcg, Oral, Daily    liothyronine (CYTOMEL) 5 mcg, Oral, Every morning    losartan (COZAAR) 50 mg, Oral, Nightly    oxyCODONE-acetaminophen (PERCOCET)  mg per tablet 1 tablet, Oral, Every 8 hours PRN    promethazine (PHENERGAN) 25 mg, Oral, Every 6 hours PRN    rosuvastatin (CRESTOR) 40 mg, Oral, Daily    sumatriptan (IMITREX) 25 MG Tab 1 tablet, Oral, Daily PRN    triamcinolone acetonide 0.5% (KENALOG) 0.5 % Crea Topical (Top), 2 times daily          Objective:      Visit Vitals  /78  "  Pulse 85   Ht 5' 6" (1.676 m)   Wt 92 kg (202 lb 13.2 oz)   BMI 32.74 kg/m²     Physical Exam    General the patient is alert and oriented x3 no acute distress nontoxic-appearing appropriate affect.    Constitutional: Vital signs are examined and stable.  Resp: No signs of labored breathing               LLE: -Skin: incision well healed without drainage or signs of infection.  Overlying butterfly strips noted.  No evidence of dehiscence.  Pin sites of femur with small amount of serous drainage.  Tibia pin sites dry.            -MSK: digits and ankle with intact gross motor.             -Neuro:  Sensation intact to light touch L3-S1 dermatomes           -Lymphatic: No signs of lymphadenopathy           -CV: Palpable PT and DP pulse. Compartments soft and compressible                        Body mass index is 32.74 kg/m².  Ideal body weight: 59.3 kg (130 lb 11.7 oz)  Adjusted ideal body weight: 72.4 kg (159 lb 9.1 oz)  Hgb   Date Value Ref Range Status   04/28/2024 10.8 (L) 12.0 - 16.0 g/dL Final   04/05/2024 12.9 12.0 - 16.0 g/dL Final     Hct   Date Value Ref Range Status   04/28/2024 33.8 (L) 37.0 - 47.0 % Final   04/05/2024 40.3 37.0 - 47.0 % Final     No results found for: "[id number]"  WBC   Date Value Ref Range Status   04/28/2024 9.56 4.50 - 11.50 x10(3)/mcL Final   04/05/2024 7.12 4.50 - 11.50 x10(3)/mcL Final             Assessment:           ICD-10-CM ICD-9-CM   1. 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    1. 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  Cont local wound care.  Consents signed today.  Allowed time for questions.  Questions answered to pt satisfaction.  Pt verbalizes understanding and agrees with tx plan.  Pt verbalizes understanding and agrees with tx plan. Pt to call clinic with any questions/concerns.      The above findings, diagnostics, and treatment plan were discussed with Dr. Corona who is in agreement with the plan of care except as stated in additional documentation.     Hellen Ablerts PA-C  [facility]        No future appointments.       This note was created with the assistance of voice recognition software or phone dictation. There may be transcription errors as a result of using this technology however minimal. Effort has been made to assure accuracy of transcription but any obvious errors or omissions should be clarified with the author of the document.

## 2024-05-23 ENCOUNTER — ANESTHESIA EVENT (OUTPATIENT)
Dept: SURGERY | Facility: HOSPITAL | Age: 64
End: 2024-05-23
Payer: MEDICARE

## 2024-05-23 LAB — CEFTIBUTEN ISLT MIC: NORMAL

## 2024-05-26 ENCOUNTER — PATIENT MESSAGE (OUTPATIENT)
Dept: ADMINISTRATIVE | Facility: OTHER | Age: 64
End: 2024-05-26
Payer: MEDICARE

## 2024-05-27 ENCOUNTER — PATIENT MESSAGE (OUTPATIENT)
Dept: ORTHOPEDICS | Facility: CLINIC | Age: 64
End: 2024-05-27
Payer: MEDICARE

## 2024-05-27 DIAGNOSIS — S82.042K CLOSED DISPLACED COMMINUTED FRACTURE OF LEFT PATELLA WITH NONUNION: ICD-10-CM

## 2024-05-28 ENCOUNTER — PATIENT MESSAGE (OUTPATIENT)
Dept: ADMINISTRATIVE | Facility: OTHER | Age: 64
End: 2024-05-28
Payer: MEDICARE

## 2024-05-28 RX ORDER — OXYCODONE AND ACETAMINOPHEN 10; 325 MG/1; MG/1
1 TABLET ORAL EVERY 8 HOURS PRN
Qty: 21 TABLET | Refills: 0 | Status: SHIPPED | OUTPATIENT
Start: 2024-05-28 | End: 2024-06-04 | Stop reason: SDUPTHER

## 2024-05-28 NOTE — PRE-PROCEDURE INSTRUCTIONS
"[facility]: Outpatient Surgery  Preprocedure Instructions    Expectations: "Because of inconsistent procedure completion times, an unexpected wait may occur. The physicians would like you to be here to prepare in the event they run ahead of time. We will make you as comfortable as possible and keep you informed. We apologize in advance if this happens."     Your arrival time for your surgery or procedure is ___0500___.  We ask patients to arrive about 2 hours before surgery to allow for enough time to review your health history & medications, start your IV, complete any outstanding labwork or tests, and meet your Anesthesiologist.    We are located at [facility], [address] [place] [address].    Enter through the [place] entrance next to the Emergency Room, and come to the 6th floor to the Outpatient Surgery Department.    If you are in need of a wheelchair the morning of surgery please call [phone] about 15 minutes before you arrive. Parking is available in our parking garage located off [facility], between the hospital and [facility].      Visitory Policy:  You are allowed 2 adult visitors to be with you in the hospital. Please, no switching visitors in pre-op area. All hospital visitors should be in good current health.  No small children.  We will update you and your family hourly on the progression of surgery and any unexpected delays.      What to Bring:  Please have your ID, insurance cards, and all home medication bottles with you at check in.  Bring your CPAP machine if one is used at home.     Fasting:  Nothing to eat or drink after midnight the night before your procedure. This includes no ice, gum, hard candies, and/or tobacco products.    Medications:  Follow your doctor's instructions for taking any medications on the morning of your procedure.  If no instructions for taking medications were given, do not take any medications but bring your " medications in their bottles to your procedure check in.     Follow your doctor's preoperative instructions regarding skin prep, bowel prep, bathing, or showering prior to your procedure.  If any special soaps were provided to you, please use according to your doctor's instructions. If no instructions were given from your doctor, take a good bath or shower with antibacterial soap the night before and the morning of your procedure.  On the morning of procedure, wear loose, comfortable clothing.  No lotions, makeup, perfumes, colognes, deodorant, or jewelry to your procedure.  Removable items (glasses, contact lenses, dentures, retainers, hearing aids) need to be removed for your procedure.  Bring your storage containers for these items if you wear them.     Artificial nails, body jewelry, eyelash extensions, and/or hair extensions with metal clips are not allowed during your surgery.  If you currently wear any of these items, please arrange for them to be removed prior to your arrival to the hospital.     Outpatient or Same Day Surgeries:  Any patients receiving sedation/anesthesia are advised not to drive for 24 hours after their procedure.  We do not allow patients to drive themselves home after discharge.  If you are going home after your procedure, please have someone available to drive you home from the hospital.     You may call the Outpatient Surgery Department at [phone] with any questions or concerns.  We are looking forward to meeting you and taking great care of you for your procedure.  Thank you for choosing [facility] for your surgical needs.

## 2024-05-29 ENCOUNTER — ANESTHESIA (OUTPATIENT)
Dept: SURGERY | Facility: HOSPITAL | Age: 64
End: 2024-05-29
Payer: MEDICARE

## 2024-05-29 ENCOUNTER — HOSPITAL ENCOUNTER (OUTPATIENT)
Facility: HOSPITAL | Age: 64
Discharge: HOME | End: 2024-05-29
Attending: ORTHOPAEDIC SURGERY | Admitting: ORTHOPAEDIC SURGERY
Payer: MEDICARE

## 2024-05-29 DIAGNOSIS — S82.042K CLOSED DISPLACED COMMINUTED FRACTURE OF LEFT PATELLA WITH NONUNION: ICD-10-CM

## 2024-05-29 DIAGNOSIS — S82.042K: Primary | ICD-10-CM

## 2024-05-29 PROCEDURE — 36000707: Performed by: ORTHOPAEDIC SURGERY

## 2024-05-29 PROCEDURE — 71000033 HC RECOVERY, INTIAL HOUR: Performed by: ORTHOPAEDIC SURGERY

## 2024-05-29 PROCEDURE — 63600175 PHARM REV CODE 636 W HCPCS: Performed by: ANESTHESIOLOGY

## 2024-05-29 PROCEDURE — 20694 RMVL EXT FIXJ SYS UNDER ANES: CPT | Mod: 58,,, | Performed by: ORTHOPAEDIC SURGERY

## 2024-05-29 PROCEDURE — 37000008 HC ANESTHESIA 1ST 15 MINUTES: Performed by: ORTHOPAEDIC SURGERY

## 2024-05-29 PROCEDURE — D9220A PRA ANESTHESIA: Mod: CRNA,,, | Performed by: NURSE ANESTHETIST, CERTIFIED REGISTERED

## 2024-05-29 PROCEDURE — 36000706: Performed by: ORTHOPAEDIC SURGERY

## 2024-05-29 PROCEDURE — 25000003 PHARM REV CODE 250

## 2024-05-29 PROCEDURE — 63600175 PHARM REV CODE 636 W HCPCS: Performed by: NURSE ANESTHETIST, CERTIFIED REGISTERED

## 2024-05-29 PROCEDURE — 37000009 HC ANESTHESIA EA ADD 15 MINS: Performed by: ORTHOPAEDIC SURGERY

## 2024-05-29 PROCEDURE — 71000016 HC POSTOP RECOV ADDL HR: Performed by: ORTHOPAEDIC SURGERY

## 2024-05-29 PROCEDURE — 71000015 HC POSTOP RECOV 1ST HR: Performed by: ORTHOPAEDIC SURGERY

## 2024-05-29 PROCEDURE — D9220A PRA ANESTHESIA: Mod: ANES,,, | Performed by: ANESTHESIOLOGY

## 2024-05-29 PROCEDURE — 25000003 PHARM REV CODE 250: Performed by: NURSE ANESTHETIST, CERTIFIED REGISTERED

## 2024-05-29 PROCEDURE — 63600175 PHARM REV CODE 636 W HCPCS

## 2024-05-29 RX ORDER — OXYCODONE HYDROCHLORIDE 5 MG/1
5 TABLET ORAL EVERY 6 HOURS PRN
Status: DISCONTINUED | OUTPATIENT
Start: 2024-05-29 | End: 2024-05-29 | Stop reason: HOSPADM

## 2024-05-29 RX ORDER — OXYCODONE HYDROCHLORIDE 5 MG/1
10 TABLET ORAL EVERY 4 HOURS PRN
Status: DISCONTINUED | OUTPATIENT
Start: 2024-05-29 | End: 2024-05-29 | Stop reason: HOSPADM

## 2024-05-29 RX ORDER — ACETAMINOPHEN 10 MG/ML
1000 INJECTION, SOLUTION INTRAVENOUS ONCE
Status: DISCONTINUED | OUTPATIENT
Start: 2024-05-29 | End: 2024-05-29 | Stop reason: HOSPADM

## 2024-05-29 RX ORDER — MIDAZOLAM HYDROCHLORIDE 1 MG/ML
INJECTION INTRAMUSCULAR; INTRAVENOUS
Status: DISCONTINUED | OUTPATIENT
Start: 2024-05-29 | End: 2024-05-29

## 2024-05-29 RX ORDER — FENTANYL CITRATE 50 UG/ML
INJECTION, SOLUTION INTRAMUSCULAR; INTRAVENOUS
Status: DISCONTINUED | OUTPATIENT
Start: 2024-05-29 | End: 2024-05-29

## 2024-05-29 RX ORDER — SODIUM CHLORIDE, SODIUM GLUCONATE, SODIUM ACETATE, POTASSIUM CHLORIDE AND MAGNESIUM CHLORIDE 30; 37; 368; 526; 502 MG/100ML; MG/100ML; MG/100ML; MG/100ML; MG/100ML
INJECTION, SOLUTION INTRAVENOUS CONTINUOUS
Status: CANCELLED | OUTPATIENT
Start: 2024-05-29 | End: 2024-06-28

## 2024-05-29 RX ORDER — MIDAZOLAM HYDROCHLORIDE 2 MG/2ML
2 INJECTION, SOLUTION INTRAMUSCULAR; INTRAVENOUS ONCE AS NEEDED
Status: CANCELLED | OUTPATIENT
Start: 2024-05-29 | End: 2035-10-25

## 2024-05-29 RX ORDER — METHOCARBAMOL 500 MG/1
500 TABLET, FILM COATED ORAL 3 TIMES DAILY
Status: DISCONTINUED | OUTPATIENT
Start: 2024-05-29 | End: 2024-05-29 | Stop reason: HOSPADM

## 2024-05-29 RX ORDER — ONDANSETRON HYDROCHLORIDE 2 MG/ML
INJECTION, SOLUTION INTRAVENOUS
Status: DISCONTINUED | OUTPATIENT
Start: 2024-05-29 | End: 2024-05-29

## 2024-05-29 RX ORDER — HYDROMORPHONE HYDROCHLORIDE 2 MG/ML
0.2 INJECTION, SOLUTION INTRAMUSCULAR; INTRAVENOUS; SUBCUTANEOUS EVERY 6 HOURS PRN
Status: DISCONTINUED | OUTPATIENT
Start: 2024-05-29 | End: 2024-05-29 | Stop reason: HOSPADM

## 2024-05-29 RX ORDER — MORPHINE SULFATE 4 MG/ML
2 INJECTION, SOLUTION INTRAMUSCULAR; INTRAVENOUS EVERY 4 HOURS PRN
Status: DISCONTINUED | OUTPATIENT
Start: 2024-05-29 | End: 2024-05-29 | Stop reason: HOSPADM

## 2024-05-29 RX ORDER — KETOROLAC TROMETHAMINE 30 MG/ML
INJECTION, SOLUTION INTRAMUSCULAR; INTRAVENOUS
Status: DISCONTINUED | OUTPATIENT
Start: 2024-05-29 | End: 2024-05-29

## 2024-05-29 RX ORDER — CEFAZOLIN SODIUM 2 G/50ML
2 SOLUTION INTRAVENOUS
Status: COMPLETED | OUTPATIENT
Start: 2024-05-29 | End: 2024-05-29

## 2024-05-29 RX ORDER — ONDANSETRON HYDROCHLORIDE 2 MG/ML
4 INJECTION, SOLUTION INTRAVENOUS DAILY PRN
Status: DISCONTINUED | OUTPATIENT
Start: 2024-05-29 | End: 2024-05-29 | Stop reason: HOSPADM

## 2024-05-29 RX ORDER — MUPIROCIN 20 MG/G
OINTMENT TOPICAL
Status: DISCONTINUED | OUTPATIENT
Start: 2024-05-29 | End: 2024-05-29 | Stop reason: HOSPADM

## 2024-05-29 RX ORDER — LIDOCAINE HYDROCHLORIDE 10 MG/ML
1 INJECTION, SOLUTION EPIDURAL; INFILTRATION; INTRACAUDAL; PERINEURAL ONCE
Status: CANCELLED | OUTPATIENT
Start: 2024-05-29 | End: 2024-05-29

## 2024-05-29 RX ORDER — DEXAMETHASONE SODIUM PHOSPHATE 4 MG/ML
INJECTION, SOLUTION INTRA-ARTICULAR; INTRALESIONAL; INTRAMUSCULAR; INTRAVENOUS; SOFT TISSUE
Status: DISCONTINUED | OUTPATIENT
Start: 2024-05-29 | End: 2024-05-29

## 2024-05-29 RX ORDER — HYDROMORPHONE HYDROCHLORIDE 2 MG/ML
0.2 INJECTION, SOLUTION INTRAMUSCULAR; INTRAVENOUS; SUBCUTANEOUS EVERY 5 MIN PRN
Status: DISCONTINUED | OUTPATIENT
Start: 2024-05-29 | End: 2024-05-29

## 2024-05-29 RX ORDER — ACETAMINOPHEN 10 MG/ML
INJECTION, SOLUTION INTRAVENOUS
Status: DISCONTINUED | OUTPATIENT
Start: 2024-05-29 | End: 2024-05-29

## 2024-05-29 RX ORDER — PROPOFOL 10 MG/ML
VIAL (ML) INTRAVENOUS
Status: DISCONTINUED | OUTPATIENT
Start: 2024-05-29 | End: 2024-05-29

## 2024-05-29 RX ORDER — DIPHENHYDRAMINE HYDROCHLORIDE 50 MG/ML
25 INJECTION INTRAMUSCULAR; INTRAVENOUS EVERY 6 HOURS PRN
Status: DISCONTINUED | OUTPATIENT
Start: 2024-05-29 | End: 2024-05-29 | Stop reason: HOSPADM

## 2024-05-29 RX ADMIN — ONDANSETRON 4 MG: 2 INJECTION INTRAMUSCULAR; INTRAVENOUS at 07:05

## 2024-05-29 RX ADMIN — FENTANYL CITRATE 50 MCG: 50 INJECTION, SOLUTION INTRAMUSCULAR; INTRAVENOUS at 07:05

## 2024-05-29 RX ADMIN — OXYCODONE HYDROCHLORIDE 10 MG: 5 TABLET ORAL at 09:05

## 2024-05-29 RX ADMIN — HYDROMORPHONE HYDROCHLORIDE 0.2 MG: 2 INJECTION, SOLUTION INTRAMUSCULAR; INTRAVENOUS; SUBCUTANEOUS at 08:05

## 2024-05-29 RX ADMIN — CEFAZOLIN SODIUM 2 G: 2 SOLUTION INTRAVENOUS at 07:05

## 2024-05-29 RX ADMIN — MUPIROCIN: 20 OINTMENT TOPICAL at 05:05

## 2024-05-29 RX ADMIN — MIDAZOLAM HYDROCHLORIDE 2 MG: 1 INJECTION, SOLUTION INTRAMUSCULAR; INTRAVENOUS at 07:05

## 2024-05-29 RX ADMIN — SODIUM CHLORIDE, SODIUM GLUCONATE, SODIUM ACETATE, POTASSIUM CHLORIDE AND MAGNESIUM CHLORIDE: 526; 502; 368; 37; 30 INJECTION, SOLUTION INTRAVENOUS at 07:05

## 2024-05-29 RX ADMIN — DEXAMETHASONE SODIUM PHOSPHATE 8 MG: 4 INJECTION, SOLUTION INTRA-ARTICULAR; INTRALESIONAL; INTRAMUSCULAR; INTRAVENOUS; SOFT TISSUE at 07:05

## 2024-05-29 RX ADMIN — KETOROLAC TROMETHAMINE 15 MG: 30 INJECTION, SOLUTION INTRAMUSCULAR; INTRAVENOUS at 07:05

## 2024-05-29 RX ADMIN — ACETAMINOPHEN 1000 MG: 10 INJECTION, SOLUTION INTRAVENOUS at 07:05

## 2024-05-29 RX ADMIN — FENTANYL CITRATE 25 MCG: 50 INJECTION, SOLUTION INTRAMUSCULAR; INTRAVENOUS at 07:05

## 2024-05-29 RX ADMIN — PROPOFOL 140 MG: 10 INJECTION, EMULSION INTRAVENOUS at 07:05

## 2024-05-29 NOTE — TRANSFER OF CARE
"Anesthesia Transfer of Care Note    Patient: Briseyda Torres    Procedure(s) Performed: Procedure(s) (LRB):  REMOVAL, EXTERNAL FIXATION DEVICE (Left)    Patient location: PACU    Anesthesia Type: general    Transport from OR: Transported from OR on room air with adequate spontaneous ventilation    Post pain: adequate analgesia    Post assessment: no apparent anesthetic complications    Post vital signs: stable    Level of consciousness: sedated, awake and responds to stimulation    Nausea/Vomiting: no nausea/vomiting    Complications: none    Transfer of care protocol was followed      Last vitals: Visit Vitals  /70   Pulse 67   Temp 36.6 °C (97.9 °F) (Temporal)   Resp 17   Ht 5' 6" (1.676 m)   Wt 92 kg (202 lb 13.5 oz)   SpO2 97%   Breastfeeding No   BMI 32.74 kg/m²     "

## 2024-05-29 NOTE — DISCHARGE INSTRUCTIONS
Ortho Follow up instructions  **All questions or concerns should be handled at your surgeon's office in follow up ([phone]). If it is a weekend or after hours, you will get the surgeon on call. **    BLOOD CLOT PREVENTION:   Aspirin 81mg twice a day for 4 more weeks. Start today.     CONSTIPATION PREVENTION:   Miralax or Senokot S/Rox-colace and Stool softeners every day while on pain meds.  Use other MORE AGGRESSIVE over the counter LAXATIVES as needed for constipation (Examples - Milk of Magnesia, Dulcolax suppositories, Magnesium Citrate, Fleet's enemas...etc).  Drink lots of water  Increase Fiber in diet    ACTIVITY:   Weight bearing precautions as follows: may weight bear to left leg WITH LEFT KNEE IN EXTENSION (locked with brace).  May have it set from 0-30 degrees while at rest.  No knee flexion with walking.   No heavy lifting, bending, pushing, or pulling.  Move around at least every 1-2 hours maintaining weight bearing precautions and switch positions throughout the day.     WOUND CARE:   start dressing change on 5/31/24. Change dressing daily and as needed for soiling.   NOTIFY YOUR SURGEON OF EXCESSIVE WOUND DRAINAGE- You may have some bloody drainage from the incision over the first few days  DO NOT WET WOUND or apply any ointments, creams, lotions or antiseptics.  No showers until post op day 7. Do not submerge. Do not apply ointments or creams to incisions.     FALL PREVENTION:  Wear sturdy shoes that fit well; walking around in bare feet, or only socks, can also increase your risk of falling.  Use walker or crutches as instructed by therapy  Use good lighting and  throw rugs, electrical cords, furniture and clutter (anything than can cause you to trip at home.)  Non-slip rug in bathroom or shower    INFECTION PREVENTION:  Wound care instructions as above.   DO NOT WET YOUR DRESSING/WOUND(S).   NOTIFY YOUR SURGEON OF EXCESSIVE WOUND DRAINAGE.  Practice good handwashing before and after  dressing changes.  No pets in bed or near wound (s)/dressing(s).  No Alcohol, smoking or tobacco products  IF YOU ARE DIABETIC, maintain good blood sugar control throughout your recovery.    Call your SURGEON'S OFFICE if you experience the following signs and symptoms of infection:   Unusual redness, swelling, excessive, cloudy or foul smelling drainage at the incision site.   Persistent temp greater than 101.5 F, unrelieved by Tylenol  Pain at surgical site, unrelieved by pain meds    For emergencies, please report to OUR ([facility] main [place]) Emergency department

## 2024-05-29 NOTE — OP NOTE
[facility]                       [address]                      [place] [address]    PATIENT NAME:      ROSY ASHRAF  YOB: 1960  CSN:               [id number]  MRN:               [id number]  ADMIT DATE:        05/29/2024 05:20:00  PHYSICIAN:         Curtis Corona MD                          OPERATIVE REPORT      DATE OF SURGERY:    05/29/2024 00:00:00    SURGEON:  Curtis Corona MD    PREOPERATIVE DIAGNOSIS:  Left comminuted patella fracture with nonunion, status   post repair with application of external fixator.    POSTOPERATIVE DIAGNOSIS:  Left comminuted patella fracture with nonunion, status   post repair with application of external fixator.    PROCEDURE:    1. Removal of external fixator under anesthesia.    2. Manipulation of left knee under anesthesia.    ANESTHESIA:  General.    ESTIMATED BLOOD LOSS:  5 mL.    COMPLICATIONS:  None.    COUNTS:  No counts were performed.  No sterile setup was open.    INDICATIONS FOR PROCEDURE:  The patient is a 64-year-old female who has had a   complicated course following a left comminuted patella fracture status post open   reduction and internal fixation.  She had a failure of fixation and return for   revision internal with fixation.  Then had another fall and again had failure of   her fixation.  She returned for inferior pole patellectomy with repair of   patellar tendon.  She was placed into an external fixator at that time.  She has   kept it for over a month.  She has progressed well.  She has been ambulating   weightbearing as tolerated in full extension.  She is returning to the operating   room today for removal of her external fixator.    PROCEDURE IN DETAIL:  After informed consent was obtained, the patient was met   in the outpatient holding area.  She was brought directly to the operating room,   placed supine on the operating table and general anesthesia was  induced.  All   bony prominences were well padded.  Preoperative antibiotics were given.  Her   left lower extremity was prepped with Betadine around the pin sites.  A time-out   was done indicating correct operative limb and procedure.  We removed the   external fixator in its entirety.  The pin sites were curetted clean and were   irrigated with normal saline after Betadine cleansing, visualized fluoroscopic   imaging to evaluate her patella.  She was able to be flex from 0-30 degrees   without any displacement.  We manipulated her knee to allow full range of   motion.  She tolerated this well.  Loose sutures were applied to her pin sites   and they were dressed with Xeroform, 4 x 4's, cast padding, Ace bandage.  She   was placed back into her hinged knee brace, awakened, extubated, and taken to   recovery in stable condition.    POSTOPERATIVE PLAN:  She will be discharged home today.  She can weightbear as   tolerated, left lower extremity in full extension.  She can begin 0-30 degree   range of motion.  She will have her resume her PT and follow up with us in 2   weeks for removal of her sutures.        ______________________________  MD JUSTINO Horan/SALLY  DD:  05/29/2024  Time:  07:37AM  DT:  05/29/2024  Time:  08:55AM  Job #:  [id number]      OPERATIVE REPORT

## 2024-05-29 NOTE — ANESTHESIA PREPROCEDURE EVALUATION
"                                                                                                             05/29/2024  Briseyda Torres is a 64 y.o., female with recent patella fracture with nonunion.  Ex fix in place.  Here for removal today.  She is otherwise feeling well, denies cardiopulmonary complaints.    "HPI  Pt presents fo 3.5 wk f/u of ex fix application for comminuted left patella fracture with nonunion.  She has aching pain that is better with medications.  Has been resting in bed mostly.  Avoiding re-injury/falls.  Has been washing pin sites with dial soap and water.  Her  is present today.       ROS:  Cardiopulm: No CP or difficulty breathing  Constitutional: Denies fever chills  MSK: Pain evident at site of injury located in HPI,   Integ: No signs of abrasions or lacerations  Neuro: No numbness or tingling  Lymphatic: No swelling outside the area of injury ...    Assessment:      Assessment      ICD-10-CM ICD-9-CM   1. 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 Closed comminuted fracture of left patella with nonunion  S82.042K 733.82               Plan:      Plan  No follow-ups on file.     1. 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  -     Place in Outpatient; Standing  -     Case Request Operating Room: REMOVAL, EXTERNAL FIXATION DEVICE  -     Vital signs; Standing  -     Insert peripheral IV; Standing  -     Verify informed consent; Standing  -     Diet NPO; Standing  -     Full code; Standing     2. 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  Cont local wound care.  Consents signed today.  Allowed time for questions.  Questions answered to pt satisfaction.  Pt verbalizes understanding and agrees with tx plan.  Pt verbalizes understanding and " "agrees with tx plan. Pt to call clinic with any questions/concerns.  "      Pre-op Assessment    I have reviewed the Patient Summary Reports.     I have reviewed the Nursing Notes. I have reviewed the NPO Status.   I have reviewed the Medications.     Review of Systems  Cardiovascular:     Hypertension                     Shortness of Breath                    Hypertension         Pulmonary:      Shortness of breath                  Neurological:   CVA Neuromuscular Disease,  Headaches      Dx of Headaches              CVA - Cerebrovasular Accident               Neuromuscular Disease       Physical Exam  General: Well nourished, Cooperative, Alert and Oriented    Airway:  Mallampati: II   Mouth Opening: Normal  TM Distance: Normal  Tongue: Normal  Neck ROM: Normal ROM    Dental:  Intact    Chest/Lungs:  Clear to auscultation, Normal Respiratory Rate    Heart:  Rate: Normal  Rhythm: Regular Rhythm        Anesthesia Plan  Type of Anesthesia, risks & benefits discussed:    Anesthesia Type: Gen Supraglottic Airway  Intra-op Monitoring Plan: Standard ASA Monitors  Post Op Pain Control Plan: multimodal analgesia  Induction:  IV  Airway Plan: Direct, Post-Induction  Informed Consent: Informed consent signed with the Patient and all parties understand the risks and agree with anesthesia plan.  All questions answered. Patient consented to blood products? Yes  ASA Score: 2  Day of Surgery Review of History & Physical: H&P Update referred to the surgeon/provider.    Ready For Surgery From Anesthesia Perspective.     .      "

## 2024-05-29 NOTE — BRIEF OP NOTE
[facility] - Periop Services  Brief Operative Note    Surgery Date: 5/29/2024     Surgeons and Role:     * Curtis Corona MD - Primary    Assisting Surgeon: None    Pre-op Diagnosis:  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 [S82.042K]    Post-op Diagnosis:  Post-Op Diagnosis Codes:     * 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 [S82.042K]    Procedure(s) (LRB):  REMOVAL, EXTERNAL FIXATION DEVICE (Left)  Manipulation of knee under anesthesia    Anesthesia: General    Operative Findings: see op report    Estimated Blood Loss: 5mL         Specimens:   Specimen (24h ago, onward)      None              Discharge Note    OUTCOME: Patient tolerated treatment/procedure well without complication and is now ready for discharge.    DISPOSITION: Home or Self Care    FINAL DIAGNOSIS:  as above    FOLLOWUP: In clinic    DISCHARGE INSTRUCTIONS:    -Tolerated procedure well, D/C home  -WBAT to LLE in brace locked in extendion  -0-30 degrees at rest  -Ok to resume PT  -Dressing changes in 2 days  -F/U in 2 weeks  -Scripts sent to her pharmacy yesterday      Curtis Corona MD  Orthopedic Trauma  [facility]

## 2024-05-29 NOTE — ANESTHESIA PROCEDURE NOTES
Intubation    Date/Time: 5/29/2024 7:14 AM    Performed by: Reilly Noguera CRNA  Authorized by: Luis Vallejo MD    Intubation:     Induction:  Intravenous    Intubated:  Postinduction    Mask Ventilation:  Easy with oral airway    Attempts:  1    Attempted By:  CRNA    Method of Intubation:  Other (see comments)    Difficult Airway Encountered?: No      Complications:  None    Airway Device:  Supraglottic airway/LMA    Airway Device Size:  3.0    Style/Cuff Inflation:  Cuffed (inflated to minimal occlusive pressure)    Secured at:  The lips    Placement Verified By:  Capnometry    Complicating Factors:  None    Findings Post-Intubation:  BS equal bilateral and atraumatic/condition of teeth unchanged

## 2024-05-30 VITALS
HEIGHT: 66 IN | OXYGEN SATURATION: 94 % | BODY MASS INDEX: 32.6 KG/M2 | TEMPERATURE: 98 F | DIASTOLIC BLOOD PRESSURE: 84 MMHG | HEART RATE: 64 BPM | RESPIRATION RATE: 20 BRPM | SYSTOLIC BLOOD PRESSURE: 138 MMHG | WEIGHT: 202.88 LBS

## 2024-05-30 DIAGNOSIS — S82.042K CLOSED DISPLACED COMMINUTED FRACTURE OF LEFT PATELLA WITH NONUNION: Primary | ICD-10-CM

## 2024-05-31 NOTE — ANESTHESIA POSTPROCEDURE EVALUATION
Anesthesia Post Evaluation    Patient: Briseyda Torres    Procedure(s) Performed: Procedure(s) (LRB):  REMOVAL, EXTERNAL FIXATION DEVICE (Left)  MANIPULATION, WITH ANESTHESIA (Left)    Final Anesthesia Type: general      Patient location during evaluation: PACU  Patient participation: Yes- Able to Participate  Level of consciousness: awake and alert  Post-procedure vital signs: reviewed and stable  Pain management: adequate  Airway patency: patent      Anesthetic complications: no      Cardiovascular status: blood pressure returned to baseline  Respiratory status: unassisted  Hydration status: euvolemic  Follow-up not needed.              Vitals Value Taken Time   /84 05/29/24 0931   Temp 36.6 °C (97.8 °F) 05/29/24 0831   Pulse 64 05/29/24 0931   Resp 20 05/29/24 0931   SpO2 94 % 05/29/24 0931         Event Time   Out of Recovery 08:25:00         Pain/Delma Score: No data recorded

## 2024-06-04 DIAGNOSIS — S82.042K CLOSED DISPLACED COMMINUTED FRACTURE OF LEFT PATELLA WITH NONUNION: ICD-10-CM

## 2024-06-04 RX ORDER — OXYCODONE AND ACETAMINOPHEN 10; 325 MG/1; MG/1
1 TABLET ORAL EVERY 8 HOURS PRN
Qty: 21 TABLET | Refills: 0 | Status: SHIPPED | OUTPATIENT
Start: 2024-06-04 | End: 2024-06-12 | Stop reason: SDUPTHER

## 2024-06-09 ENCOUNTER — PATIENT MESSAGE (OUTPATIENT)
Dept: ORTHOPEDICS | Facility: CLINIC | Age: 64
End: 2024-06-09
Payer: MEDICARE

## 2024-06-10 ENCOUNTER — PATIENT MESSAGE (OUTPATIENT)
Dept: ORTHOPEDICS | Facility: CLINIC | Age: 64
End: 2024-06-10
Payer: MEDICARE

## 2024-06-12 DIAGNOSIS — S82.042K CLOSED DISPLACED COMMINUTED FRACTURE OF LEFT PATELLA WITH NONUNION: ICD-10-CM

## 2024-06-13 RX ORDER — OXYCODONE AND ACETAMINOPHEN 10; 325 MG/1; MG/1
1 TABLET ORAL EVERY 8 HOURS PRN
Qty: 21 TABLET | Refills: 0 | Status: SHIPPED | OUTPATIENT
Start: 2024-06-13 | End: 2024-06-20

## 2024-06-22 DIAGNOSIS — S82.042K CLOSED DISPLACED COMMINUTED FRACTURE OF LEFT PATELLA WITH NONUNION: ICD-10-CM

## 2024-06-24 RX ORDER — OXYCODONE AND ACETAMINOPHEN 10; 325 MG/1; MG/1
1 TABLET ORAL EVERY 8 HOURS PRN
Qty: 21 TABLET | Refills: 0 | Status: SHIPPED | OUTPATIENT
Start: 2024-06-24 | End: 2024-07-01

## 2024-06-25 ENCOUNTER — HOSPITAL ENCOUNTER (OUTPATIENT)
Dept: RADIOLOGY | Facility: CLINIC | Age: 64
Discharge: HOME | End: 2024-06-25
Attending: ORTHOPAEDIC SURGERY
Payer: MEDICARE

## 2024-06-25 ENCOUNTER — OFFICE VISIT (OUTPATIENT)
Dept: ORTHOPEDICS | Facility: CLINIC | Age: 64
End: 2024-06-25
Payer: MEDICARE

## 2024-06-25 VITALS
BODY MASS INDEX: 32.59 KG/M2 | SYSTOLIC BLOOD PRESSURE: 148 MMHG | HEIGHT: 66 IN | DIASTOLIC BLOOD PRESSURE: 88 MMHG | HEART RATE: 89 BPM | WEIGHT: 202.81 LBS

## 2024-06-25 DIAGNOSIS — Z86.718 HISTORY OF DVT (DEEP VEIN THROMBOSIS): ICD-10-CM

## 2024-06-25 DIAGNOSIS — S82.042K CLOSED DISPLACED COMMINUTED FRACTURE OF LEFT PATELLA WITH NONUNION: ICD-10-CM

## 2024-06-25 DIAGNOSIS — M79.89 SWELLING OF LEFT LOWER EXTREMITY: ICD-10-CM

## 2024-06-25 DIAGNOSIS — R60.9 EDEMA, UNSPECIFIED TYPE: Primary | ICD-10-CM

## 2024-06-25 PROCEDURE — 99024 POSTOP FOLLOW-UP VISIT: CPT | Mod: POP,,,

## 2024-06-25 PROCEDURE — 73560 X-RAY EXAM OF KNEE 1 OR 2: CPT | Mod: LT,,, | Performed by: ORTHOPAEDIC SURGERY

## 2024-06-25 RX ORDER — METHOCARBAMOL 500 MG/1
TABLET, FILM COATED ORAL
COMMUNITY
Start: 2024-05-02

## 2024-06-25 NOTE — PROGRESS NOTES
"[facility]      Name: Briseyda Torres  : 1960  MRN: [id number]  Date: 2024    Chief Complaint   Patient presents with    Left Knee - Post-op Evaluation     4 week f/u from left knee ex-fix removal. Reports increase in nerve and muscle pain. Ambulates with walker. Currently in physical therapy.        Subjective:      Chief Complaint   Patient presents with    Left Knee - Post-op Evaluation     4 week f/u from left knee ex-fix removal. Reports increase in nerve and muscle pain. Ambulates with walker. Currently in physical therapy.         HPI  Briseyda Torres is a 64 y.o. female      ROS:  Constitutional: Denies fever chills  MSK: Pain evident at site of injury located in HPI,   Integ: No signs of abrasions or lacerations  Neuro: No numbness or tingling  Lymphatic: No swelling outside the area of injury     Current Outpatient Medications   Medication Instructions    ALPRAZolam (XANAX) 1 mg, Oral, Nightly    EScitalopram oxalate (LEXAPRO) 20 mg, Oral, Nightly    estradioL (ESTRACE) 1 mg, Oral, Daily    ezetimibe (ZETIA) 10 mg, Oral, Daily    hydrOXYzine (ATARAX) 50 mg, Oral, 3 times daily PRN    levothyroxine (SYNTHROID) 100 mcg, Oral, Daily    liothyronine (CYTOMEL) 5 mcg, Oral, Every morning    losartan (COZAAR) 50 mg, Oral, Nightly    methocarbamoL (ROBAXIN) 500 MG Tab 1 tablet EVERY 8 HOURS (route: oral)    oxyCODONE-acetaminophen (PERCOCET)  mg per tablet 1 tablet, Oral, Every 8 hours PRN    promethazine (PHENERGAN) 25 mg, Oral, Every 6 hours PRN    rosuvastatin (CRESTOR) 40 mg, Oral, Daily    sumatriptan (IMITREX) 25 MG Tab 1 tablet, Oral, Daily PRN    triamcinolone acetonide 0.5% (KENALOG) 0.5 % Crea Topical (Top), 2 times daily        Objective:     Visit Vitals  BP (!) 148/88   Pulse 89   Ht 5' 6" (1.676 m)   Wt 92 kg (202 lb 13.2 oz)   BMI 32.74 kg/m²     Physical Exam    General the patient is alert and oriented x3 no acute distress nontoxic-appearing " "appropriate affect.    Constitutional: Vital signs are examined and stable.  Resp: No signs of labored breathing    LLE: -Skin: incisions well healed.  No evidence of infection or wound dehiscence.            -MSK: Hip and Knee F/E, EHL/FHL, Gastroc/Tib anterior motor intact.  Knee resting at about 30 degrees flexion.  Full extension of knee.  No TTP of knee.  There is an area of nonpitting edema along the anterior proximal thigh that is also TTP.  No calf TTP.            -Neuro:  Sensation intact to light touch L3-S1 dermatomes           -CV: palpable PT pulse. Compartments soft and compressible                      Body mass index is 32.74 kg/m².  Ideal body weight: 59.3 kg (130 lb 11.7 oz)  Adjusted ideal body weight: 72.4 kg (159 lb 9.1 oz)  Hgb   Date Value Ref Range Status   04/28/2024 10.8 (L) 12.0 - 16.0 g/dL Final   04/05/2024 12.9 12.0 - 16.0 g/dL Final     Hct   Date Value Ref Range Status   04/28/2024 33.8 (L) 37.0 - 47.0 % Final   04/05/2024 40.3 37.0 - 47.0 % Final     No results found for: "[id number]"  WBC   Date Value Ref Range Status   04/28/2024 9.56 4.50 - 11.50 x10(3)/mcL Final   04/05/2024 7.12 4.50 - 11.50 x10(3)/mcL Final       Radiology:   XR L knee: evidence of bone callus formation seen at fracture site.  Fracture site stable.     Assessment:       ICD-10-CM ICD-9-CM   1. Edema, unspecified type  R60.9 782.3   2. 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 Swelling of left lower extremity  M79.89 729.81   4. History of DVT (deep vein thrombosis)  Z86.718 V12.51       Plan:     1. Edema, unspecified type  -     CV Ultrasound doppler venous DVT leg left    2. 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 Swelling of left lower extremity    4. History of DVT (deep vein thrombosis)      Pt has edema that is not worsened or alleviated with dependent position.  Low clinical suspicion for DVT, but pt has a Hx of DVT and is no longer on eliquis.  Currently taking 81 mg asa bid.  Venous dopple u/s of LLE ordered for pt to have performed today.     Pt concerned for balance and ambulation difficulties.  Requesting upright rollator.  Rx given to pt today.  Her condition impairs ambulation and she is unable to ambulate safely without equipment.  Patient requires help to get in and out of chair.  She presents in wheelchair today.  The rollator will also be used for gait and balance training.     Pt currently in PT and performing L knee flexion 0-30 degrees.  Updated order sent with pt to cont with patellar protocol. Also included balance training in order.     RTC in 2 months for repeat XR and evaluation.  Pt verbalizes understanding and agrees with tx plan. Pt to call clinic with any questions/concerns.      The above findings, diagnostics, and treatment plan were discussed with Dr. Corona who is in agreement with the plan of care except as stated in additional documentation.     Ashley Gunther, PA-C [facility]    Future Appointments   Date Time Provider Department Center   8/26/2024  1:00 PM Curtis Corona MD [facility]       This note was created with the assistance of voice recognition software or phone dictation. There may be transcription errors as a result of using this technology however minimal. Effort has been made to assure accuracy of transcription but any obvious errors or omissions should be clarified with the author of the document.

## 2024-07-03 DIAGNOSIS — S82.042K CLOSED DISPLACED COMMINUTED FRACTURE OF LEFT PATELLA WITH NONUNION: ICD-10-CM

## 2024-07-03 RX ORDER — OXYCODONE AND ACETAMINOPHEN 10; 325 MG/1; MG/1
1 TABLET ORAL EVERY 12 HOURS PRN
Qty: 14 TABLET | Refills: 0 | Status: SHIPPED | OUTPATIENT
Start: 2024-07-03 | End: 2024-07-10

## 2024-07-15 DIAGNOSIS — S82.042K CLOSED DISPLACED COMMINUTED FRACTURE OF LEFT PATELLA WITH NONUNION: ICD-10-CM

## 2024-07-16 RX ORDER — OXYCODONE AND ACETAMINOPHEN 10; 325 MG/1; MG/1
1 TABLET ORAL EVERY 12 HOURS PRN
Qty: 14 TABLET | Refills: 0 | Status: SHIPPED | OUTPATIENT
Start: 2024-07-16 | End: 2024-07-23

## 2024-07-24 DIAGNOSIS — S82.042K CLOSED DISPLACED COMMINUTED FRACTURE OF LEFT PATELLA WITH NONUNION: ICD-10-CM

## 2024-07-24 RX ORDER — OXYCODONE AND ACETAMINOPHEN 10; 325 MG/1; MG/1
1 TABLET ORAL
Qty: 7 TABLET | Refills: 0 | Status: SHIPPED | OUTPATIENT
Start: 2024-07-24 | End: 2024-07-31

## 2024-07-29 DIAGNOSIS — S82.042K CLOSED DISPLACED COMMINUTED FRACTURE OF LEFT PATELLA WITH NONUNION: ICD-10-CM

## 2024-07-30 RX ORDER — OXYCODONE AND ACETAMINOPHEN 10; 325 MG/1; MG/1
1 TABLET ORAL
Qty: 7 TABLET | Refills: 0 | Status: SHIPPED | OUTPATIENT
Start: 2024-07-30 | End: 2024-08-06

## 2024-08-06 DIAGNOSIS — S82.042K CLOSED DISPLACED COMMINUTED FRACTURE OF LEFT PATELLA WITH NONUNION: Primary | ICD-10-CM

## 2024-08-06 RX ORDER — OXYCODONE AND ACETAMINOPHEN 5; 325 MG/1; MG/1
1 TABLET ORAL
Qty: 7 TABLET | Refills: 0 | Status: SHIPPED | OUTPATIENT
Start: 2024-08-06 | End: 2024-08-13

## 2024-08-13 DIAGNOSIS — S82.042K CLOSED DISPLACED COMMINUTED FRACTURE OF LEFT PATELLA WITH NONUNION: ICD-10-CM

## 2024-08-13 RX ORDER — OXYCODONE AND ACETAMINOPHEN 10; 325 MG/1; MG/1
1 TABLET ORAL
Qty: 7 TABLET | Refills: 0 | Status: CANCELLED | OUTPATIENT
Start: 2024-08-13 | End: 2024-08-20

## 2024-08-13 RX ORDER — OXYCODONE AND ACETAMINOPHEN 5; 325 MG/1; MG/1
1 TABLET ORAL
Qty: 7 TABLET | Refills: 0 | Status: SHIPPED | OUTPATIENT
Start: 2024-08-13 | End: 2024-08-20

## 2024-08-28 ENCOUNTER — OFFICE VISIT (OUTPATIENT)
Dept: ORTHOPEDICS | Facility: CLINIC | Age: 64
End: 2024-08-28
Payer: MEDICARE

## 2024-08-28 ENCOUNTER — HOSPITAL ENCOUNTER (OUTPATIENT)
Dept: RADIOLOGY | Facility: CLINIC | Age: 64
Discharge: HOME | End: 2024-08-28
Attending: ORTHOPAEDIC SURGERY
Payer: MEDICARE

## 2024-08-28 VITALS
WEIGHT: 223 LBS | BODY MASS INDEX: 35.84 KG/M2 | HEART RATE: 63 BPM | HEIGHT: 66 IN | SYSTOLIC BLOOD PRESSURE: 136 MMHG | DIASTOLIC BLOOD PRESSURE: 82 MMHG

## 2024-08-28 DIAGNOSIS — S82.042K CLOSED DISPLACED COMMINUTED FRACTURE OF LEFT PATELLA WITH NONUNION: Primary | ICD-10-CM

## 2024-08-28 DIAGNOSIS — S82.042K CLOSED DISPLACED COMMINUTED FRACTURE OF LEFT PATELLA WITH NONUNION: ICD-10-CM

## 2024-08-28 PROCEDURE — 73560 X-RAY EXAM OF KNEE 1 OR 2: CPT | Mod: LT,,, | Performed by: ORTHOPAEDIC SURGERY

## 2024-08-28 PROCEDURE — 99213 OFFICE O/P EST LOW 20 MIN: CPT | Mod: ,,,

## 2024-08-28 RX ORDER — LOSARTAN POTASSIUM 100 MG/1
100 TABLET ORAL
COMMUNITY
Start: 2024-08-26

## 2024-08-28 NOTE — PROGRESS NOTES
[facility]      Name: Briseyda Torres  : 1960  MRN: [id number]  Date: 2024    Chief Complaint   Patient presents with    Left Knee - Follow-up     3mons Lt Knee fix removal Sx 24 patient states she can't feel her and can't walk w/o a walker. She would like increase her activity at PT to other things bc what she doing is not helping. It swells and sore.        Subjective:      Chief Complaint   Patient presents with    Left Knee - Follow-up     3mons Lt Knee fix removal Sx 24 patient states she can't feel her and can't walk w/o a walker. She would like increase her activity at PT to other things bc what she doing is not helping. It swells and sore.         HPI  Briseyda Torres is a 64 y.o. female presents to clinic for three-month evaluation of left patella open reduction internal fixation.  Reports her balance is getting better although she still has difficulty with balance.  She is using a Rollator for ambulation today.  Currently in physical therapy but reports she would like to increase what she is doing.  She does have some swelling and soreness but otherwise is doing well.  No other complaints at this time      ROS:  Constitutional: Denies fever chills  MSK: Pain evident at site of injury located in HPI,   Integ: No signs of abrasions or lacerations  Neuro: No numbness or tingling  Lymphatic: No swelling outside the area of injury     Current Outpatient Medications   Medication Instructions    ALPRAZolam (XANAX) 1 mg, Oral, Nightly    EScitalopram oxalate (LEXAPRO) 20 mg, Oral, Nightly    estradioL (ESTRACE) 1 mg, Oral, Daily    ezetimibe (ZETIA) 10 mg, Oral, Daily    hydrOXYzine (ATARAX) 50 mg, Oral, 3 times daily PRN    levothyroxine (SYNTHROID) 100 mcg, Oral, Daily    liothyronine (CYTOMEL) 5 mcg, Oral, Every morning    losartan (COZAAR) 50 mg, Nightly    losartan (COZAAR) 100 mg, Oral    methocarbamoL (ROBAXIN) 500 MG Tab 1 tablet EVERY 8 HOURS (route:  "oral)    promethazine (PHENERGAN) 25 mg, Oral, Every 6 hours PRN    rosuvastatin (CRESTOR) 40 mg, Oral, Daily    sumatriptan (IMITREX) 25 MG Tab 1 tablet, Oral, Daily PRN    triamcinolone acetonide 0.5% (KENALOG) 0.5 % Crea Topical (Top), 2 times daily        Objective:     Visit Vitals  /82 (BP Location: Right arm, Patient Position: Sitting, BP Method: Large (Automatic))   Pulse 63   Ht 5' 6" (1.676 m)   Wt 101.2 kg (223 lb)   BMI 35.99 kg/m²     Physical Exam    General the patient is alert and oriented x3 no acute distress nontoxic-appearing appropriate affect.    Constitutional: Vital signs are examined and stable.  Resp: No signs of labored breathing    LLE: -Skin: incisions well healed.  No evidence of infection or wound dehiscence.            -MSK: Hip and Knee F/E, EHL/FHL, Gastroc/Tib anterior motor intact.  Full extension of knee.  Able to flex knee to approximately 80° with passive range of motion.  No TTP of knee.  There is an area of nonpitting edema along the anterior proximal thigh that is also TTP.  No calf TTP.            -Neuro:  Sensation intact to light touch L3-S1 dermatomes           -CV: palpable PT pulse. Compartments soft and compressible                      Body mass index is 35.99 kg/m².  Ideal body weight: 59.3 kg (130 lb 11.7 oz)  Adjusted ideal body weight: 76 kg (167 lb 10.2 oz)  Hgb   Date Value Ref Range Status   04/28/2024 10.8 (L) 12.0 - 16.0 g/dL Final   04/05/2024 12.9 12.0 - 16.0 g/dL Final     Hct   Date Value Ref Range Status   04/28/2024 33.8 (L) 37.0 - 47.0 % Final   04/05/2024 40.3 37.0 - 47.0 % Final     No results found for: "[id number]"  WBC   Date Value Ref Range Status   04/28/2024 9.56 4.50 - 11.50 x10(3)/mcL Final   04/05/2024 7.12 4.50 - 11.50 x10(3)/mcL Final       Radiology:   XR L knee: evidence of bone callus formation seen at fracture site.  Fracture consolidating.     Assessment:       ICD-10-CM ICD-9-CM   1. Closed displaced comminuted fracture of " left patella with nonunion  S82.042K 733.82       Plan:     1. 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  She does need to achieve a greater range of motion in regards to flexion of affected knee.  She will continue physical therapy.  Orders updated to add aggressive range of motion.  She will need follow up in a few months in Dr. Corona's clinic for repeat evaluation and x-rays.  Continue weight-bearing as tolerated and range of motion as tolerated.    The above findings, diagnostics, and treatment plan were discussed with Dr. Corona who is in agreement with the plan of care except as stated in additional documentation.     [facility] [place] [facility]    Future Appointments   Date Time Provider Department Center   12/2/2024 10:15 AM Curtis Corona MD [facility] [place]       This note was created with the assistance of voice recognition software or phone dictation. There may be transcription errors as a result of using this technology however minimal. Effort has been made to assure accuracy of transcription but any obvious errors or omissions should be clarified with the author of the document.

## 2024-10-02 ENCOUNTER — PATIENT MESSAGE (OUTPATIENT)
Dept: ORTHOPEDICS | Facility: CLINIC | Age: 64
End: 2024-10-02
Payer: MEDICARE

## 2024-10-02 DIAGNOSIS — S82.042K CLOSED DISPLACED COMMINUTED FRACTURE OF LEFT PATELLA WITH NONUNION: Primary | ICD-10-CM

## 2024-11-19 ENCOUNTER — PATIENT MESSAGE (OUTPATIENT)
Dept: ORTHOPEDICS | Facility: CLINIC | Age: 64
End: 2024-11-19
Payer: MEDICARE

## 2024-12-02 ENCOUNTER — HOSPITAL ENCOUNTER (OUTPATIENT)
Dept: RADIOLOGY | Facility: CLINIC | Age: 64
Discharge: HOME | End: 2024-12-02
Attending: ORTHOPAEDIC SURGERY
Payer: MEDICARE

## 2024-12-02 ENCOUNTER — OFFICE VISIT (OUTPATIENT)
Dept: ORTHOPEDICS | Facility: CLINIC | Age: 64
End: 2024-12-02
Payer: MEDICARE

## 2024-12-02 VITALS
DIASTOLIC BLOOD PRESSURE: 84 MMHG | WEIGHT: 222.69 LBS | BODY MASS INDEX: 35.79 KG/M2 | HEIGHT: 66 IN | SYSTOLIC BLOOD PRESSURE: 149 MMHG

## 2024-12-02 DIAGNOSIS — S82.042K CLOSED DISPLACED COMMINUTED FRACTURE OF LEFT PATELLA WITH NONUNION: Primary | ICD-10-CM

## 2024-12-02 DIAGNOSIS — S82.042K CLOSED DISPLACED COMMINUTED FRACTURE OF LEFT PATELLA WITH NONUNION: ICD-10-CM

## 2024-12-02 PROCEDURE — 73560 X-RAY EXAM OF KNEE 1 OR 2: CPT | Mod: LT,,, | Performed by: ORTHOPAEDIC SURGERY

## 2024-12-02 NOTE — PROGRESS NOTES
"[facility]      Name: Briseyda Torres  : 1960  MRN: [id number]  Date: 2024    Chief Complaint   Patient presents with    Left Knee - Follow-up     6 month f/u from left knee ex fix. Ambulates with single crutch. Reports improvement in pain and discomfort.        Subjective:      Chief Complaint   Patient presents with    Left Knee - Follow-up     6 month f/u from left knee ex fix. Ambulates with single crutch. Reports improvement in pain and discomfort.         Follow-up      Briseyda Torres is a 64 y.o. female presents to clinic for 6 month evaluation of left patella open reduction internal fixation.  Currently ambulating with a single crutch on her right side.  Was in physical therapy and reports it was helping.  Thinks she needs to continue doing physical therapy.  Reports she does not want to falling in.  Pain well-controlled.  No other complaints at this time.    ROS:  Constitutional: Denies fever chills  MSK: Pain evident at site of injury located in HPI,   Integ: No signs of abrasions or lacerations  Neuro: No numbness or tingling  Lymphatic: No swelling outside the area of injury     Current Outpatient Medications   Medication Instructions    ALPRAZolam (XANAX) 1 mg, Nightly    EScitalopram oxalate (LEXAPRO) 20 mg, Nightly    estradioL (ESTRACE) 1 mg, Daily    ezetimibe (ZETIA) 10 mg, Daily    hydrOXYzine (ATARAX) 50 mg, 3 times daily PRN    levothyroxine (SYNTHROID) 100 mcg, Daily    liothyronine (CYTOMEL) 5 mcg, Every morning    losartan (COZAAR) 100 mg    promethazine (PHENERGAN) 25 mg, Every 6 hours PRN    rosuvastatin (CRESTOR) 40 mg, Daily    sumatriptan (IMITREX) 25 MG Tab 1 tablet, Daily PRN    triamcinolone acetonide 0.5% (KENALOG) 0.5 % Crea Topical (Top), 2 times daily        Objective:     Visit Vitals  BP (!) 149/84   Ht 5' 6" (1.676 m)   Wt 101 kg (222 lb 10.6 oz)   BMI 35.94 kg/m²     Physical Exam    General the patient is alert and oriented x3 no " "acute distress nontoxic-appearing appropriate affect.    Constitutional: Vital signs are examined and stable.  Resp: No signs of labored breathing    LLE: -Skin: incisions well healed.  No evidence of infection or wound dehiscence.            -MSK: Hip and Knee F/E, EHL/FHL, Gastroc/Tib anterior motor intact.  Full extension of knee.  Able to flex knee to approximately 90° with active range of motion.  No TTP of knee.  No calf TTP.            -Neuro:  Sensation intact to light touch L3-S1 dermatomes           -CV: palpable PT pulse. Compartments soft and compressible                      Body mass index is 35.94 kg/m².  Ideal body weight: 59.3 kg (130 lb 11.7 oz)  Adjusted ideal body weight: 76 kg (167 lb 8.1 oz)  Hgb   Date Value Ref Range Status   04/28/2024 10.8 (L) 12.0 - 16.0 g/dL Final   04/05/2024 12.9 12.0 - 16.0 g/dL Final     Hct   Date Value Ref Range Status   04/28/2024 33.8 (L) 37.0 - 47.0 % Final   04/05/2024 40.3 37.0 - 47.0 % Final     No results found for: "[id number]"  WBC   Date Value Ref Range Status   04/28/2024 9.56 4.50 - 11.50 x10(3)/mcL Final   04/05/2024 7.12 4.50 - 11.50 x10(3)/mcL Final       Radiology:   XR L knee: Fracture consolidated.  Joint spaces preserved.     Assessment:       ICD-10-CM ICD-9-CM   1. 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  She may continue ambulating and performing range of motion as tolerated.  New physical therapy order given to patient.  She is at approximately 90° of active knee flexion.  She will continue to try to improve this " with therapy and stretching at home.  She will need follow up in a few months in Dr. Corona's clinic for repeat evaluation and x-rays.  Continue weight-bearing as tolerated and range of motion as tolerated.    The above findings, diagnostics, and treatment plan were discussed with Dr. Corona who is in agreement with the plan of care except as stated in additional documentation.     Ashley Gunther, PA-C [facility]    Future Appointments   Date Time Provider Department Center   3/10/2025 10:15 AM [facility] [place]       This note was created with the assistance of voice recognition software or phone dictation. There may be transcription errors as a result of using this technology however minimal. Effort has been made to assure accuracy of transcription but any obvious errors or omissions should be clarified with the author of the document.

## 2024-12-11 ENCOUNTER — PATIENT MESSAGE (OUTPATIENT)
Dept: ORTHOPEDICS | Facility: CLINIC | Age: 64
End: 2024-12-11
Payer: MEDICARE

## 2024-12-16 ENCOUNTER — OUTSIDE PLACE OF SERVICE (OUTPATIENT)
Dept: INTERVENTIONAL RADIOLOGY/VASCULAR | Facility: CLINIC | Age: 64
End: 2024-12-16
Payer: MEDICARE

## 2025-01-04 ENCOUNTER — HOSPITAL ENCOUNTER (EMERGENCY)
Facility: HOSPITAL | Age: 65
Discharge: HOME | End: 2025-01-05
Attending: SURGERY
Payer: MEDICARE

## 2025-01-04 DIAGNOSIS — H65.03 BILATERAL ACUTE SEROUS OTITIS MEDIA, RECURRENCE NOT SPECIFIED: Primary | ICD-10-CM

## 2025-01-04 DIAGNOSIS — I10 UNCONTROLLED HYPERTENSION: ICD-10-CM

## 2025-01-04 DIAGNOSIS — J30.9 ALLERGIC SINUSITIS: ICD-10-CM

## 2025-01-04 PROCEDURE — 99284 EMERGENCY DEPT VISIT MOD MDM: CPT

## 2025-01-04 NOTE — Clinical Note
"Briseyda Coxanda"Melissa was seen and treated in our emergency department on 1/4/2025.  She may return to work on 01/08/2025.       If you have any questions or concerns, please don't hesitate to call.      Tyson Montes De Oca MD"

## 2025-01-05 VITALS
DIASTOLIC BLOOD PRESSURE: 94 MMHG | HEIGHT: 66 IN | SYSTOLIC BLOOD PRESSURE: 161 MMHG | TEMPERATURE: 98 F | BODY MASS INDEX: 36.96 KG/M2 | WEIGHT: 230 LBS | RESPIRATION RATE: 20 BRPM | HEART RATE: 75 BPM | OXYGEN SATURATION: 98 %

## 2025-01-05 LAB
B PERT.PT PRMT NPH QL NAA+NON-PROBE: NOT DETECTED
C PNEUM DNA NPH QL NAA+NON-PROBE: NOT DETECTED
FLUAV H1 2009 PAN RNA NPH NAA+NON-PROBE: NORMAL
FLUAV H1 RNA NPH QL NAA+NON-PROBE: NORMAL
FLUAV H3 RNA NPH QL NAA+NON-PROBE: NORMAL
FLUAV RNA NPH QL NAA+NON-PROBE: NOT DETECTED
FLUAV RNA RESP QL NAA+PROBE: NORMAL
FLUBV RNA NPH QL NAA+NON-PROBE: NOT DETECTED
HADV DNA NPH QL NAA+NON-PROBE: NOT DETECTED
HCOV 229E RNA NPH QL NAA+NON-PROBE: NOT DETECTED
HCOV HKU1 RNA NPH QL NAA+NON-PROBE: NOT DETECTED
HCOV NL63 RNA NPH QL NAA+NON-PROBE: NOT DETECTED
HCOV OC43 RNA NPH QL NAA+NON-PROBE: NOT DETECTED
HMPV RNA NPH QL NAA+NON-PROBE: NOT DETECTED
HPIV1 RNA NPH QL NAA+NON-PROBE: NOT DETECTED
HPIV2 RNA NPH QL NAA+NON-PROBE: NOT DETECTED
HPIV3 RNA NPH QL NAA+NON-PROBE: NOT DETECTED
HPIV4 RNA NPH QL NAA+NON-PROBE: NOT DETECTED
M PNEUMO DNA NPH QL NAA+NON-PROBE: NOT DETECTED
RAPID GROUP A STREP (OHS): NEGATIVE
RSV RNA NPH QL NAA+NON-PROBE: NOT DETECTED
RSV RNA NPH QL NAA+NON-PROBE: NOT DETECTED
RV+EV RNA NPH QL NAA+NON-PROBE: NOT DETECTED
SARS-COV-2 RNA RESP QL NAA+PROBE: NOT DETECTED

## 2025-01-05 PROCEDURE — 94640 AIRWAY INHALATION TREATMENT: CPT

## 2025-01-05 PROCEDURE — 25000003 PHARM REV CODE 250: Performed by: SURGERY

## 2025-01-05 PROCEDURE — 63600175 PHARM REV CODE 636 W HCPCS: Performed by: SURGERY

## 2025-01-05 PROCEDURE — 87651 STREP A DNA AMP PROBE: CPT | Performed by: SURGERY

## 2025-01-05 PROCEDURE — 87798 DETECT AGENT NOS DNA AMP: CPT | Performed by: SURGERY

## 2025-01-05 PROCEDURE — 94761 N-INVAS EAR/PLS OXIMETRY MLT: CPT

## 2025-01-05 PROCEDURE — 96372 THER/PROPH/DIAG INJ SC/IM: CPT | Performed by: SURGERY

## 2025-01-05 PROCEDURE — 94760 N-INVAS EAR/PLS OXIMETRY 1: CPT

## 2025-01-05 PROCEDURE — 25000242 PHARM REV CODE 250 ALT 637 W/ HCPCS: Performed by: SURGERY

## 2025-01-05 RX ORDER — AMOXICILLIN AND CLAVULANATE POTASSIUM 875; 125 MG/1; MG/1
1 TABLET, FILM COATED ORAL EVERY 12 HOURS
Qty: 20 TABLET | Refills: 0 | Status: SHIPPED | OUTPATIENT
Start: 2025-01-05 | End: 2025-01-15

## 2025-01-05 RX ORDER — CLONIDINE HYDROCHLORIDE 0.1 MG/1
0.3 TABLET ORAL ONCE
Status: COMPLETED | OUTPATIENT
Start: 2025-01-05 | End: 2025-01-05

## 2025-01-05 RX ORDER — AMOXICILLIN AND CLAVULANATE POTASSIUM 875; 125 MG/1; MG/1
1 TABLET, FILM COATED ORAL ONCE
Status: COMPLETED | OUTPATIENT
Start: 2025-01-05 | End: 2025-01-05

## 2025-01-05 RX ORDER — HYDRALAZINE HYDROCHLORIDE 25 MG/1
25 TABLET, FILM COATED ORAL
Status: COMPLETED | OUTPATIENT
Start: 2025-01-05 | End: 2025-01-05

## 2025-01-05 RX ORDER — DEXAMETHASONE SODIUM PHOSPHATE 4 MG/ML
4 INJECTION, SOLUTION INTRA-ARTICULAR; INTRALESIONAL; INTRAMUSCULAR; INTRAVENOUS; SOFT TISSUE
Status: COMPLETED | OUTPATIENT
Start: 2025-01-05 | End: 2025-01-05

## 2025-01-05 RX ORDER — KETOROLAC TROMETHAMINE 30 MG/ML
30 INJECTION, SOLUTION INTRAMUSCULAR; INTRAVENOUS
Status: COMPLETED | OUTPATIENT
Start: 2025-01-05 | End: 2025-01-05

## 2025-01-05 RX ORDER — GUAIFENESIN AND DEXTROMETHORPHAN HYDROBROMIDE 1200; 60 MG/1; MG/1
1 TABLET, EXTENDED RELEASE ORAL 2 TIMES DAILY
Qty: 20 TABLET | Refills: 0 | Status: SHIPPED | OUTPATIENT
Start: 2025-01-05 | End: 2025-01-15

## 2025-01-05 RX ORDER — IPRATROPIUM BROMIDE AND ALBUTEROL SULFATE 2.5; .5 MG/3ML; MG/3ML
3 SOLUTION RESPIRATORY (INHALATION) ONCE
Status: COMPLETED | OUTPATIENT
Start: 2025-01-05 | End: 2025-01-05

## 2025-01-05 RX ADMIN — AMOXICILLIN AND CLAVULANATE POTASSIUM 1 TABLET: 875; 125 TABLET, FILM COATED ORAL at 02:01

## 2025-01-05 RX ADMIN — DEXAMETHASONE SODIUM PHOSPHATE 4 MG: 4 INJECTION, SOLUTION INTRA-ARTICULAR; INTRALESIONAL; INTRAMUSCULAR; INTRAVENOUS; SOFT TISSUE at 02:01

## 2025-01-05 RX ADMIN — KETOROLAC TROMETHAMINE 30 MG: 30 INJECTION, SOLUTION INTRAMUSCULAR at 02:01

## 2025-01-05 RX ADMIN — CLONIDINE HYDROCHLORIDE 0.3 MG: 0.1 TABLET ORAL at 02:01

## 2025-01-05 RX ADMIN — HYDRALAZINE HYDROCHLORIDE 25 MG: 25 TABLET ORAL at 01:01

## 2025-01-05 RX ADMIN — IPRATROPIUM BROMIDE AND ALBUTEROL SULFATE 3 ML: 2.5; .5 SOLUTION RESPIRATORY (INHALATION) at 02:01

## 2025-01-05 NOTE — ED PROVIDER NOTES
Encounter Date: 1/4/2025       History     Chief Complaint   Patient presents with    Sore Throat    Headache    Otalgia     SYMPTOMS X2 WEEKS, UNRELIEVED WITH OTC MEDS     63 YO AAF W/ RECURRENT PROVOKED LLEXT DVT, Hx/o MIGRAINE CEPHALGIA, HTN, FIBROMYALGIA, ALLERGIC RHINITIS, HLD PRESENTING W/ PROGRESSIVE SINUS CONGESTION ASSOCIATED W/ BILATERAL OTIC FULLNESS (NOT TRUE OTALGIA), DYSPHONIA, & NONPRODUCTIVE COUGH.  NO FC.  NO SOB.  +DYSPHONIA WORSE IN AM.          Review of patient's allergies indicates:   Allergen Reactions    Amlodipine Swelling     Per patient     Azithromycin     Levofloxacin     Lisinopril Edema     Past Medical History:   Diagnosis Date    Acute deep vein thrombosis (DVT) of left lower extremity, unspecified vein 10/12/2023    pt stated not completely resolved however pcp stopped eliquis 1mo ago and dr kim will restart postop per pt    Allergies     Anxiety     Arthritis     Closed displaced comminuted fracture of left patella, initial encounter     Closed displaced comminuted fracture of left patella, sequela     Digestive disorder     acid reflux    Fibromyalgia     HLD (hyperlipidemia)     Hypertension     Insomnia     Migraine     Obesity     Pain in left leg     Painful orthopaedic hardware     Plantar fasciitis, right     SOB (shortness of breath)     Stroke 2005    Systemic lupus erythematosus, organ or system involvement unspecified     Thyroid disease      Past Surgical History:   Procedure Laterality Date    CATARACT EXTRACTION      CHOLECYSTECTOMY      COLONOSCOPY      ESOPHAGOGASTRODUODENOSCOPY      HYSTERECTOMY      INCISION AND DRAINAGE, LOWER EXTREMITY Left 11/23/2023    Procedure: INCISION AND DRAINAGE, LOWER EXTREMITY;  Surgeon: Curtis Kim MD;  [facility];  Service: Orthopedics;  Laterality: Left;  I & D left knee for dehiscence/vascular table/bone foam/3L saline with cysto tubing/Vancomycin/Xperience irrigation/13cm Prevena    LUMBAR SPINE SURGERY       MANIPULATION WITH ANESTHESIA Left 5/29/2024    Procedure: MANIPULATION, WITH ANESTHESIA;  Surgeon: Curtis Corona MD;  Location: [facility] OR;  Service: Orthopedics;  Laterality: Left;    OPEN REDUCTION AND INTERNAL FIXATION (ORIF) OF FRACTURE OF PATELLA Left 10/27/2023    Procedure: ORIF, FRACTURE, PATELLA, LEFT;  Surgeon: Curtis Corona MD;  Location: [facility] OR;  Service: Orthopedics;  Laterality: Left;  Supine, vascular bed, bone foam, tourniquet  1st case  Synthes small CCHS, 18g wire, locking patella plates    OPEN REDUCTION AND INTERNAL FIXATION (ORIF) OF FRACTURE OF PATELLA Left 4/9/2024    Procedure: REVISION ORIF, FRACTURE, PATELLA;  Surgeon: Curtis Corona MD;  Location: [facility] OR;  Service: Orthopedics;  Laterality: Left;    OPEN REDUCTION AND INTERNAL FIXATION (ORIF) OF FRACTURE OF PATELLA Left 4/26/2024    Procedure: ORIF, FRACTURE, PATELLA WITH EX_FIX APPLICATION;  Surgeon: Curtis Corona MD;  Location: [facility] OR;  Service: Orthopedics;  Laterality: Left;  Supine, vascular bed, bone foam, tourniquet  Removal of CCHS screws and application synthes ex fix  #5 fiberwire x 3, hewson suture passer and drill    REMOVAL OF EXTERNAL FIXATION DEVICE Left 5/29/2024    Procedure: REMOVAL, EXTERNAL FIXATION DEVICE;  Surgeon: Curtis Corona MD;  Location: [facility];  Service: Orthopedics;  Laterality: Left;  1st case  vascular bed  no sterile setup needed  currette and betadine for pin sites  synthes ex fix removal  pt bringing hinge brace    ROTATOR CUFF REPAIR Bilateral     total of 4 surgeries    THYROIDECTOMY      TOTAL HIP ARTHROPLASTY Right     TOTAL KNEE ARTHROPLASTY Right     c     Family History   Problem Relation Name Age of Onset    Diabetes Mother      Cancer Mother      Heart attack Mother      Heart attack Father      Diabetes Father      Cancer Father       Social History     Tobacco Use    Smoking status: Never    Smokeless tobacco: Never   Substance Use Topics    Alcohol use: Never    Drug use:  "Yes     Types: Marijuana     Comment: "medical marijuana"     Review of Systems   All other systems reviewed and are negative.      Physical Exam     Initial Vitals [01/04/25 2330]   BP Pulse Resp Temp SpO2   (!) 173/80 76 20 98 °F (36.7 °C) 99 %      MAP       --         Physical Exam    Nursing note and vitals reviewed.  Constitutional: She appears well-developed and well-nourished. No distress.   HENT:   Head: Normocephalic and atraumatic.   Right Ear: Hearing, external ear and ear canal normal. No drainage, swelling or tenderness. Tympanic membrane is injected, erythematous and bulging. Tympanic membrane is not perforated. A middle ear effusion is present.   Left Ear: Hearing, external ear and ear canal normal. No drainage, swelling or tenderness. Tympanic membrane is injected, erythematous and bulging. Tympanic membrane is not perforated. A middle ear effusion is present.   Nose: Nose normal. Mouth/Throat: Oropharynx is clear and moist. No oropharyngeal exudate.   Eyes: Conjunctivae and EOM are normal. Pupils are equal, round, and reactive to light. No scleral icterus.   Neck: Neck supple. No thyromegaly present. No tracheal deviation present. No JVD present.   Normal range of motion.  Cardiovascular:  Normal rate, regular rhythm, normal heart sounds and intact distal pulses.     Exam reveals no gallop.       No murmur heard.  Pulmonary/Chest: No stridor. No respiratory distress. She has wheezes. She has no rhonchi. She has no rales.   Abdominal: Abdomen is soft. Bowel sounds are normal. She exhibits no distension. There is no abdominal tenderness. There is no rebound and no guarding.   Musculoskeletal:      Cervical back: Normal range of motion and neck supple.     Neurological: She is alert and oriented to person, place, and time. She has normal strength. No cranial nerve deficit or sensory deficit. GCS score is 15. GCS eye subscore is 4. GCS verbal subscore is 5. GCS motor subscore is 6.   Skin: Skin is " warm.   Psychiatric: She has a normal mood and affect. Her behavior is normal. Judgment and thought content normal.         ED Course   Procedures  Labs Reviewed   THROAT SCREEN, RAPID STREP - Normal       Result Value    Rapid Group A Strep Negative     RESPIRATORY PANEL    Adenovirus Not Detected      Coronavirus 229E Not Detected      Coronavirus HKU1 Not Detected      Coronavirus NL63 Not Detected      Coronavirus OC43 PCR, Common Cold Virus Not Detected      Human Metapneumovirus Not Detected      Parainfluenza Virus 1 Not Detected      Parainfluenza Virus 2 Not Detected      Parainfluenza Virus 3 Not Detected      Parainfluenza Virus 4 Not Detected      Bordetella pertussis (ptxP) Not Detected      Chlamydia pneumoniae Not Detected      Mycoplasma pneumoniae Not Detected      Human Rhinovirus/Enterovirus Not Detected      Bordetella parapertussis (NF1881) Not Detected      Influenza A Not Detected      INFLUENZA A (NO SUBTYPE)        INFLUENZA A (SUBTYPE H1)        INFLUENZA A (SUBTYPE H1-2009)        INFLUENZA A (SUBTYPE H3)        Influenza B Not Detected      Respiratory Syncytial Virus Not Detected      SARS-CoV-2 PCR Not Detected      Narrative:     The BioFire Respiratory Panel 2.1 (RP2.1) is a PCR-based multiplexed nucleic acid test intended for use with the BioFire® 2.0 for simultaneous qualitative detection and identification of multiple respiratory viral and bacterial nucleic acids in nasopharyngeal swabs (NPS) obtained from individuals suspected of respiratory tract infections.          Imaging Results    None          Medications   albuterol-ipratropium 2.5 mg-0.5 mg/3 mL nebulizer solution 3 mL (3 mLs Nebulization Given 1/5/25 0210)   hydrALAZINE tablet 25 mg (25 mg Oral Given 1/5/25 0114)   dexAMETHasone injection 4 mg (4 mg Intramuscular Given 1/5/25 0209)   ketorolac injection 30 mg (30 mg Intramuscular Given 1/5/25 0212)   amoxicillin-clavulanate 875-125mg per tablet 1 tablet (1 tablet Oral  Given 1/5/25 0230)   cloNIDine tablet 0.3 mg (0.3 mg Oral Given 1/5/25 0230)     Medical Decision Making  Amount and/or Complexity of Data Reviewed  Labs:  Decision-making details documented in ED Course.    Risk  OTC drugs.  Prescription drug management.               ED Course as of 01/05/25 0449   Sun Jan 05, 2025   0207 RAPID STREP A SCREEN: Negative [WV]      ED Course User Index  [WV] Tyson Montes De Oca MD                           Clinical Impression:  Final diagnoses:  [H65.03] Bilateral acute serous otitis media, recurrence not specified (Primary)  [J30.9] Allergic sinusitis  [I10] Uncontrolled hypertension          ED Disposition Condition    Discharge Stable          ED Prescriptions       Medication Sig Dispense Start Date End Date Auth. Provider    dextromethorphan-guaiFENesin (MUCINEX DM) 60-1,200 mg per 12 hr tablet Take 1 tablet by mouth 2 (two) times a day. for 10 days 20 tablet 1/5/2025 1/15/2025 Tyson Montes De Oca MD    amoxicillin-clavulanate 875-125mg (AUGMENTIN) 875-125 mg per tablet Take 1 tablet by mouth every 12 (twelve) hours. for 10 days 20 tablet 1/5/2025 1/15/2025 Tyson Montes De Oca MD          Follow-up Information       Follow up With Specialties Details Why Contact Info    Deo Jacobs MD Family Medicine On 1/6/2025  [address] [place] [address]  [address] [place] [id number]  [phone]               Tyson Montes De Oca MD  01/05/25 0449

## 2025-02-13 ENCOUNTER — OFFICE VISIT (OUTPATIENT)
Dept: FAMILY MEDICINE | Facility: CLINIC | Age: 65
End: 2025-02-13
Payer: MEDICARE

## 2025-02-13 ENCOUNTER — HOSPITAL ENCOUNTER (OUTPATIENT)
Dept: RADIOLOGY | Facility: HOSPITAL | Age: 65
Discharge: HOME | End: 2025-02-13
Attending: NURSE PRACTITIONER
Payer: MEDICARE

## 2025-02-13 VITALS
HEART RATE: 72 BPM | WEIGHT: 231 LBS | DIASTOLIC BLOOD PRESSURE: 78 MMHG | HEIGHT: 66 IN | TEMPERATURE: 98 F | SYSTOLIC BLOOD PRESSURE: 152 MMHG | RESPIRATION RATE: 18 BRPM | BODY MASS INDEX: 37.12 KG/M2 | OXYGEN SATURATION: 97 %

## 2025-02-13 DIAGNOSIS — Z87.42 HISTORY OF ENDOMETRIOSIS: ICD-10-CM

## 2025-02-13 DIAGNOSIS — R22.31 AXILLARY LUMP, RIGHT: ICD-10-CM

## 2025-02-13 DIAGNOSIS — E03.9 HYPOTHYROIDISM, UNSPECIFIED TYPE: ICD-10-CM

## 2025-02-13 DIAGNOSIS — Z12.11 ENCOUNTER FOR SCREENING COLONOSCOPY: Primary | ICD-10-CM

## 2025-02-13 DIAGNOSIS — I82.4Y2 ACUTE DEEP VEIN THROMBOSIS (DVT) OF PROXIMAL VEIN OF LEFT LOWER EXTREMITY: ICD-10-CM

## 2025-02-13 DIAGNOSIS — H65.06 RECURRENT ACUTE SEROUS OTITIS MEDIA OF BOTH EARS: ICD-10-CM

## 2025-02-13 DIAGNOSIS — E78.5 HYPERLIPIDEMIA, UNSPECIFIED HYPERLIPIDEMIA TYPE: ICD-10-CM

## 2025-02-13 DIAGNOSIS — Z12.11 ENCOUNTER FOR SCREENING COLONOSCOPY: ICD-10-CM

## 2025-02-13 DIAGNOSIS — R10.31 RIGHT LOWER QUADRANT ABDOMINAL PAIN: ICD-10-CM

## 2025-02-13 DIAGNOSIS — R79.9 ABNORMAL FINDING OF BLOOD CHEMISTRY, UNSPECIFIED: ICD-10-CM

## 2025-02-13 DIAGNOSIS — Z76.89 ENCOUNTER TO ESTABLISH CARE: ICD-10-CM

## 2025-02-13 DIAGNOSIS — M32.8 OTHER FORMS OF SYSTEMIC LUPUS ERYTHEMATOSUS, UNSPECIFIED ORGAN INVOLVEMENT STATUS: ICD-10-CM

## 2025-02-13 PROCEDURE — 99215 OFFICE O/P EST HI 40 MIN: CPT | Mod: PBBFAC,25 | Performed by: NURSE PRACTITIONER

## 2025-02-13 PROCEDURE — 99999 PR PBB SHADOW E&M-EST. PATIENT-LVL V: CPT | Mod: PBBFAC,,, | Performed by: NURSE PRACTITIONER

## 2025-02-13 PROCEDURE — 74022 RADEX COMPL AQT ABD SERIES: CPT | Mod: TC

## 2025-02-13 RX ORDER — LOSARTAN POTASSIUM AND HYDROCHLOROTHIAZIDE 12.5; 1 MG/1; MG/1
1 TABLET ORAL DAILY
COMMUNITY
Start: 2025-01-07

## 2025-02-13 RX ORDER — APIXABAN 5 MG/1
5 TABLET, FILM COATED ORAL 2 TIMES DAILY
COMMUNITY
Start: 2024-12-14

## 2025-02-13 RX ORDER — OXYCODONE AND ACETAMINOPHEN 10; 325 MG/1; MG/1
1 TABLET ORAL EVERY 12 HOURS PRN
COMMUNITY
Start: 2025-01-30

## 2025-02-13 RX ORDER — EPINEPHRINE 0.3 MG/.3ML
1 INJECTION SUBCUTANEOUS ONCE
COMMUNITY
Start: 2025-01-07

## 2025-02-13 RX ORDER — ALBUTEROL SULFATE 0.83 MG/ML
2.5 SOLUTION RESPIRATORY (INHALATION) 4 TIMES DAILY PRN
COMMUNITY
Start: 2025-01-08

## 2025-02-13 NOTE — PROGRESS NOTES
Patient ID: Briseyda Torres is a 64 y.o. female.    Chief Complaint: Establish Care, Referral (Patient requesting referral to General Sx for lump under left armpit/breast after doing biopsy finding it was benign.), and Deep Vein Thrombosis (Patient reports having a DVT in LLE. Patient was started on Eliquis by Dr. Cruz in [place] at [facility].)      History of Present Illness    CHIEF COMPLAINT:  Patient presents today for evaluation of a breast lump and pelvic pain    BREAST MASS:  She reports a right axillary mass. Ultrasound-guided core biopsy showed benign, non-cancerous lesion.    CURRENT SYMPTOMS:  She reports excruciating right-sided pelvic pain. She experiences fecal incontinence with uncontrolled passage of watery feces. She also has post-meal diarrhea, particularly after her first meal of the day, requiring anti-diarrheal medication.    DEEP VEIN THROMBOSIS:  She initially developed DVT following a patella fracture requiring five surgeries. While the initial DVT resolved with treatment, it recurred last year. She has been on Eliquis for the past two months since the recurrence was discovered at the vein center.    MEDICAL HISTORY:  She has systemic lupus erythematosus (SLE) with generalized involvement throughout the body. She experiences frequent upper respiratory issues including laryngitis, severe sinus problems, and ear complications. She has chronic itching persisting for hours causing scratches throughout the body. She has a history of endometriosis diagnosed in .    SURGICAL HISTORY:  She underwent total hysterectomy in , laparoscopic cholecystectomy, two laparoscopic knee replacements, and a hip replacement.    OBSTETRIC HISTORY:  She has had four pregnancies: one live birth ( son) and three miscarriages.      ROS:  General: -fever, -chills, -fatigue, -weight gain, -weight loss  Eyes: -vision changes, -redness, -discharge  ENT: -ear pain, -nasal  "congestion, -sore throat  Cardiovascular: -chest pain, -palpitations, -lower extremity edema  Respiratory: -cough, -shortness of breath  Gastrointestinal: -abdominal pain, -nausea, -vomiting, +diarrhea, -constipation, -blood in stool  Genitourinary: -dysuria, -hematuria, -frequency, +pelvic pain  Musculoskeletal: -joint pain, -muscle pain  Skin: -rash, -lesion, +itching  Neurological: -headache, -dizziness, -numbness, -tingling  Psychiatric: -anxiety, -depression, -sleep difficulty       BP (!) 152/78 (BP Location: Right arm, Patient Position: Sitting)   Pulse 72   Temp 98.4 °F (36.9 °C)   Resp 18   Ht 5' 6" (1.676 m)   Wt 104.8 kg (231 lb)   SpO2 97%   BMI 37.28 kg/m²      Physical Exam    General: No acute distress. Well-developed. Well-nourished.  Eyes: EOMI. Sclerae anicteric.  HENT: Normocephalic. Atraumatic. Nares patent. Moist oral mucosa.  Ears: Bilateral TMs clear. Bilateral EACs clear.  Cardiovascular: Regular rate. Regular rhythm. No murmurs. No rubs. No gallops. Normal S1, S2.  Respiratory: Normal respiratory effort. Clear to auscultation bilaterally. No rales. No rhonchi. No wheezing.  Abdomen: Soft. Non-tender. Non-distended. Normoactive bowel sounds.  Musculoskeletal: No  obvious deformity.  Extremities: No lower extremity edema.  Neurological: Alert & oriented x3. No slurred speech. Normal gait.  Psychiatric: Normal mood. Normal affect. Good insight. Good judgment.  Skin: Warm. Dry. No rash.         1. Encounter for screening colonoscopy  Overview:  Per matrix    Orders:  -     Cologuard Screening (Multitarget Stool DNA); Future; Expected date: 02/13/2025  -     XR ABDOMEN, ACUTE 2 OR MORE VIEWS WITH CHEST; Future; Expected date: 02/13/2025  -     US Transvaginal Non OB; Future; Expected date: 02/13/2025    2. Other forms of systemic lupus erythematosus, unspecified organ involvement status  Overview:  History of SLE.  Patient was seeing MD in [place].    Orders:  -     Ambulatory " referral/consult to Rheumatology; Future; Expected date: 02/21/2025    3. History of endometriosis  Overview:  G4T1, total hyst 1984, heavy menses, was 18 at start of menses due to painful cycle.    Orders:  -     Cancel: Ambulatory referral/consult to Obstetrics / Gynecology; Future; Expected date: 02/20/2025  -     Ambulatory referral/consult to Obstetrics / Gynecology; Future; Expected date: 02/20/2025    4. Axillary lump, right  Overview:  Mammogram completed in [place], suspicious lump in left axilla on mammogram.  Biopsy completed in [place], and was found to be benign.  Patient notes that lump is painful and needs to be removed.  Referral completed.    Orders:  -     Ambulatory referral/consult to Breast Surgery; Future; Expected date: 02/20/2025    5. Encounter to establish care  -     CBC Auto Differential; Future; Expected date: 02/13/2025  -     Comprehensive Metabolic Panel; Future; Expected date: 02/13/2025  -     Lipid Panel; Future; Expected date: 02/13/2025  -     TSH; Future; Expected date: 02/13/2025  -     Hemoglobin A1C; Future; Expected date: 02/13/2025  -     Urinalysis; Future; Expected date: 02/13/2025  -     T4, Free; Future; Expected date: 02/13/2025  -     Vitamin D; Future; Expected date: 02/13/2025  -     XR ABDOMEN, ACUTE 2 OR MORE VIEWS WITH CHEST; Future; Expected date: 02/13/2025  -     US Transvaginal Non OB; Future; Expected date: 02/13/2025    6. Recurrent acute serous otitis media of both ears  Overview:  Patient notes tinnitus to the left ear, TMs red canal red.    Orders:  -     Ambulatory referral/consult to ENT; Future; Expected date: 02/20/2025  -     CBC Auto Differential; Future; Expected date: 02/13/2025  -     Comprehensive Metabolic Panel; Future; Expected date: 02/13/2025    7. Hyperlipidemia, unspecified hyperlipidemia type  -     CBC Auto Differential; Future; Expected date: 02/13/2025  -     Comprehensive Metabolic Panel; Future; Expected date:  02/13/2025  -     Lipid Panel; Future; Expected date: 02/13/2025    8. Hypothyroidism, unspecified type  Overview:  Patient on Cytomel 5 mcg in Synthroid 100mcg    Orders:  -     TSH; Future; Expected date: 02/13/2025  -     T4, Free; Future; Expected date: 02/13/2025    9. Abnormal finding of blood chemistry, unspecified  -     Hemoglobin A1C; Future; Expected date: 02/13/2025    10. Body mass index (BMI) 37.0-37.9, adult  Overview:  BMI elevated at 37.25.    Orders:  -     Vitamin D; Future; Expected date: 02/13/2025    11. Right lower quadrant abdominal pain  Overview:  Complain of pain to the lower right quadrant of the abdomen.  X-ray of the abdomen ordered.  We will refer to OBGYN.      12. Acute deep vein thrombosis (DVT) of proximal vein of left lower extremity  Overview:  DVT left lower extremity and on Eliquis.         Assessment & Plan    IMPRESSION:  Evaluated right breast/axillary lump, previously biopsied and found benign  Assessed chronic DVT, currently managed by vein center  Evaluated right-sided pelvic pain of unknown etiology  Assessed for possible fecal incontinence    DEEP VEIN THROMBOSIS (DVT):  - Continued Eliquis for DVT management.    ABDOMINAL PAIN:  - Ordered abdominal x-ray.    PELVIC PAIN:  - Ordered pelvic ultrasound (vaginal).  - Referred to Dr. Hagan (OB-GYN) in [place] for evaluation of pelvic pain.    BREAST LUMP:  - Referred to Dr. Chapa at the [facility] in [place] for evaluation of right breast/axillary lump.  - Follow up with Dr. Chapa at the [facility] in [place], potentially next Tuesday.    LABS:  - Ordered fasting lab work to be completed in [place] prior to next appointment.              Follow up in about 2 weeks (around 2/27/2025) for routine follow up, Labs done prior to appointment.    This note was generated with the assistance of ambient listening technology. Verbal consent was obtained by the patient and accompanying visitor(s) for the recording of  patient appointment to facilitate this note. I attest to having reviewed and edited the generated note for accuracy, though some syntax or spelling errors may persist. Please contact the author of this note for any clarification.

## 2025-02-14 ENCOUNTER — LAB VISIT (OUTPATIENT)
Dept: LAB | Facility: HOSPITAL | Age: 65
End: 2025-02-14
Attending: NURSE PRACTITIONER
Payer: MEDICARE

## 2025-02-14 DIAGNOSIS — E78.5 HYPERLIPIDEMIA, UNSPECIFIED HYPERLIPIDEMIA TYPE: ICD-10-CM

## 2025-02-14 DIAGNOSIS — H65.06 RECURRENT ACUTE SEROUS OTITIS MEDIA OF BOTH EARS: ICD-10-CM

## 2025-02-14 DIAGNOSIS — E03.9 HYPOTHYROIDISM, UNSPECIFIED TYPE: ICD-10-CM

## 2025-02-14 DIAGNOSIS — Z76.89 ENCOUNTER TO ESTABLISH CARE: ICD-10-CM

## 2025-02-14 DIAGNOSIS — R79.9 ABNORMAL FINDING OF BLOOD CHEMISTRY, UNSPECIFIED: ICD-10-CM

## 2025-02-14 PROBLEM — T81.31XA SURGICAL WOUND DEHISCENCE, INITIAL ENCOUNTER: Status: RESOLVED | Noted: 2023-11-23 | Resolved: 2025-02-14

## 2025-02-14 PROBLEM — Z12.11 ENCOUNTER FOR SCREENING COLONOSCOPY: Status: ACTIVE | Noted: 2025-02-14

## 2025-02-14 LAB
ALBUMIN SERPL-MCNC: 4.7 G/DL (ref 3.4–5)
ALBUMIN/GLOB SERPL: 1.6 RATIO
ALP SERPL-CCNC: 105 UNIT/L (ref 50–144)
ALT SERPL-CCNC: 47 UNIT/L (ref 1–45)
ANION GAP SERPL CALC-SCNC: 8 MEQ/L (ref 2–13)
AST SERPL-CCNC: 42 UNIT/L (ref 14–36)
BASOPHILS # BLD AUTO: 0.02 X10(3)/MCL (ref 0.01–0.08)
BASOPHILS NFR BLD AUTO: 0.3 % (ref 0.1–1.2)
BILIRUB SERPL-MCNC: 0.7 MG/DL (ref 0–1)
BILIRUB UR QL STRIP.AUTO: NEGATIVE
BUN SERPL-MCNC: 16 MG/DL (ref 7–20)
CALCIUM SERPL-MCNC: 9.1 MG/DL (ref 8.4–10.2)
CHLORIDE SERPL-SCNC: 102 MMOL/L (ref 98–110)
CHOLEST SERPL-MCNC: 136 MG/DL (ref 0–200)
CLARITY UR: CLEAR
CO2 SERPL-SCNC: 28 MMOL/L (ref 21–32)
COLOR UR AUTO: YELLOW
CREAT SERPL-MCNC: 0.84 MG/DL (ref 0.66–1.25)
CREAT/UREA NIT SERPL: 19 (ref 12–20)
EOSINOPHIL # BLD AUTO: 0.22 X10(3)/MCL (ref 0.04–0.36)
EOSINOPHIL NFR BLD AUTO: 3.1 % (ref 0.7–7)
ERYTHROCYTE [DISTWIDTH] IN BLOOD BY AUTOMATED COUNT: 12.4 % (ref 11–14.5)
EST. AVERAGE GLUCOSE BLD GHB EST-MCNC: 137 MG/DL (ref 70–115)
GFR SERPLBLD CREATININE-BSD FMLA CKD-EPI: 78 ML/MIN/1.73/M2
GLOBULIN SER-MCNC: 3 GM/DL (ref 2–3.9)
GLUCOSE SERPL-MCNC: 103 MG/DL (ref 70–115)
GLUCOSE UR QL STRIP: NEGATIVE
HBA1C MFR BLD: 6.4 % (ref 4–6)
HCT VFR BLD AUTO: 39.4 % (ref 36–48)
HDLC SERPL-MCNC: 63 MG/DL (ref 40–60)
HGB BLD-MCNC: 13.2 G/DL (ref 11.8–16)
HGB UR QL STRIP: NEGATIVE
IMM GRANULOCYTES # BLD AUTO: 0.02 X10(3)/MCL (ref 0–0.03)
IMM GRANULOCYTES NFR BLD AUTO: 0.3 % (ref 0–0.5)
KETONES UR QL STRIP: NEGATIVE
LDLC SERPL DIRECT ASSAY-SCNC: 55.9 MG/DL (ref 30–100)
LEUKOCYTE ESTERASE UR QL STRIP: NEGATIVE
LYMPHOCYTES # BLD AUTO: 2.15 X10(3)/MCL (ref 1.16–3.74)
LYMPHOCYTES NFR BLD AUTO: 30.5 % (ref 20–55)
MCH RBC QN AUTO: 28 PG (ref 27–34)
MCHC RBC AUTO-ENTMCNC: 33.5 G/DL (ref 31–37)
MCV RBC AUTO: 83.5 FL (ref 79–99)
MONOCYTES # BLD AUTO: 0.69 X10(3)/MCL (ref 0.24–0.36)
MONOCYTES NFR BLD AUTO: 9.8 % (ref 4.7–12.5)
NEUTROPHILS # BLD AUTO: 3.95 X10(3)/MCL (ref 1.56–6.13)
NEUTROPHILS NFR BLD AUTO: 56 % (ref 37–73)
NITRITE UR QL STRIP: NEGATIVE
NRBC BLD AUTO-RTO: 0 %
PH UR STRIP: 6 [PH]
PLATELET # BLD AUTO: 222 X10(3)/MCL (ref 140–371)
PMV BLD AUTO: 11 FL (ref 9.4–12.4)
POTASSIUM SERPL-SCNC: 3.6 MMOL/L (ref 3.5–5.1)
PROT SERPL-MCNC: 7.7 GM/DL (ref 6.3–8.2)
PROT UR QL STRIP: NEGATIVE
RBC # BLD AUTO: 4.72 X10(6)/MCL (ref 4–5.1)
SODIUM SERPL-SCNC: 138 MMOL/L (ref 136–145)
SP GR UR STRIP.AUTO: >=1.03 (ref 1–1.03)
T4 FREE SERPL-MCNC: 0.93 NG/DL (ref 0.78–2.19)
TRIGL SERPL-MCNC: 105 MG/DL (ref 30–200)
TSH SERPL-ACNC: 0.45 UIU/ML (ref 0.36–3.74)
UROBILINOGEN UR STRIP-ACNC: 0.2
WBC # BLD AUTO: 7.05 X10(3)/MCL (ref 4–11.5)

## 2025-02-14 PROCEDURE — 83036 HEMOGLOBIN GLYCOSYLATED A1C: CPT

## 2025-02-14 PROCEDURE — 80053 COMPREHEN METABOLIC PANEL: CPT

## 2025-02-14 PROCEDURE — 85025 COMPLETE CBC W/AUTO DIFF WBC: CPT

## 2025-02-14 PROCEDURE — 84443 ASSAY THYROID STIM HORMONE: CPT

## 2025-02-14 PROCEDURE — 82306 VITAMIN D 25 HYDROXY: CPT

## 2025-02-14 PROCEDURE — 81003 URINALYSIS AUTO W/O SCOPE: CPT

## 2025-02-14 PROCEDURE — 84439 ASSAY OF FREE THYROXINE: CPT

## 2025-02-14 PROCEDURE — 36415 COLL VENOUS BLD VENIPUNCTURE: CPT

## 2025-02-14 PROCEDURE — 80061 LIPID PANEL: CPT

## 2025-02-15 LAB — 25(OH)D3+25(OH)D2 SERPL-MCNC: 29 NG/ML (ref 30–80)

## 2025-02-19 ENCOUNTER — RESULTS FOLLOW-UP (OUTPATIENT)
Dept: FAMILY MEDICINE | Facility: CLINIC | Age: 65
End: 2025-02-19
Payer: MEDICARE

## 2025-02-19 ENCOUNTER — HOSPITAL ENCOUNTER (OUTPATIENT)
Dept: RADIOLOGY | Facility: HOSPITAL | Age: 65
Discharge: HOME | End: 2025-02-19
Attending: NURSE PRACTITIONER
Payer: MEDICARE

## 2025-02-19 DIAGNOSIS — Z76.89 ENCOUNTER TO ESTABLISH CARE: ICD-10-CM

## 2025-02-19 DIAGNOSIS — Z12.11 ENCOUNTER FOR SCREENING COLONOSCOPY: ICD-10-CM

## 2025-02-19 PROCEDURE — 76830 TRANSVAGINAL US NON-OB: CPT | Mod: TC

## 2025-02-26 ENCOUNTER — OFFICE VISIT (OUTPATIENT)
Dept: SURGERY | Facility: CLINIC | Age: 65
End: 2025-02-26
Payer: MEDICARE

## 2025-02-26 VITALS
HEIGHT: 66 IN | SYSTOLIC BLOOD PRESSURE: 137 MMHG | TEMPERATURE: 99 F | BODY MASS INDEX: 36.96 KG/M2 | HEART RATE: 61 BPM | WEIGHT: 230 LBS | OXYGEN SATURATION: 99 % | DIASTOLIC BLOOD PRESSURE: 83 MMHG | RESPIRATION RATE: 18 BRPM

## 2025-02-26 DIAGNOSIS — R22.32 AXILLARY LUMP, LEFT: ICD-10-CM

## 2025-02-26 PROCEDURE — 99999 PR PBB SHADOW E&M-EST. PATIENT-LVL V: CPT | Mod: PBBFAC,,, | Performed by: STUDENT IN AN ORGANIZED HEALTH CARE EDUCATION/TRAINING PROGRAM

## 2025-02-26 PROCEDURE — 99204 OFFICE O/P NEW MOD 45 MIN: CPT | Mod: S$PBB,,, | Performed by: STUDENT IN AN ORGANIZED HEALTH CARE EDUCATION/TRAINING PROGRAM

## 2025-02-26 PROCEDURE — 99215 OFFICE O/P EST HI 40 MIN: CPT | Mod: PBBFAC | Performed by: STUDENT IN AN ORGANIZED HEALTH CARE EDUCATION/TRAINING PROGRAM

## 2025-02-26 RX ORDER — ESCITALOPRAM OXALATE 20 MG/1
20 TABLET ORAL
COMMUNITY
Start: 2024-12-17

## 2025-02-26 NOTE — PROGRESS NOTES
[facility] Breast Surg  Breast Surgical Oncology  New Patient Office Visit - H&P      Care Team: Patient Care Team:  Oanh Chan FNP as PCP - General (Family Medicine)  Curtis Corona MD as Consulting Physician (Orthopedic Surgery)  Wong Villasenor MD (Interventional Cardiology)  Olinda Wong MD as Consulting Physician (Breast Surgery)  [facility] as Chronic  (Pain Medicine)   Referring Provider: Oanh Chan    Chief Complaint:   Chief Complaint   Patient presents with    Axillary Mass     Patient c/o intermittent burning left axilla mass x 1 year mild redness, mild swelling, no nipple discharge           Subjective:      History of Present Illness:  Briseyda Torres is a pleasant 64 y.o.female who presents as a referral from Oanh Chan for left axillary mass.  Patient states she noticed a left axillary mass about 1 year ago and thinks that it has increased in size since she 1st noticed it.  She states that occasionally she has some pain and burning in her left axilla. She also has some pain and burning in her lateral breast.  She also states she has some erythema occasionally underneath the left breast.  She had a bilateral diagnostic mammogram in December and they saw a 1.3 cm oval solid mass in the left axilla she underwent biopsy which revealed benign fibroadipose tissue.  She does not do anything for the pain because she states it is not that bad in comparison to other pain that she has.  She does not have any prior breast surgeries or biopsies.  No family history of breast cancer.  She had a left lower extremity DVT recently and is on Eliquis.  She had a stroke in 2005.  She uses a cane to ambulate due to injuring her left knee back in 2023 and has had multiple surgeries.    Imaging:   Bilateral diagnostic mammogram 12/11/2024:  Density B.  BI-RADS 4.  No suspicious mass architectural distortion or suspicious calcifications.  Targeted left  ultrasound performed.  There is a 1.3 x 0.6 x 0.8 cm oval hypoechoic solid mass in the left axilla.  No visible fatty hilum to confirm lymph node morphology.  Additional morphologically normal-appearing lymph nodes noted deeper in the left axilla.  Mildly suspicious mass.  Ultrasound-guided biopsy left axillary mass 12/16/2024:  Successful.  Clip in good position.  Gurinder clip.    I have reviewed the imaging and agree with the radiologists interpretation. I have discussed these results with the patient.    Pathology:   Left axillary biopsy 12/16/2024: Benign fibroadipose tissue.  No atypia or malignancy.    OB / GYN History   Menarche Onset:12  Menopause: Menopause: postmenopausal  Hormonal birth control (duration): 0years  Pregnancies: 3  Age at first child birth: 22  Child births: 1  Hysterectomy/Oophorectomy: hysterectomy with BSO, at age 23  HRT: no    Family History of Cancer (& age at diagnosis):  -   Family History   Problem Relation Name Age of Onset    Diabetes Mother Blanche pacheco     Cancer Mother Blanche bergman         Lung cancer    Heart attack Mother Blanche bergman     Heart attack Father Alberto Hernandez     Diabetes Father Alberto Hernandez     Cancer Father Alberto Hernandez         Lung cancer    Rheum arthritis Sister Kelsey hernandez         Lupus    Osteoarthritis Sister Kelsey hernandez     Hypertension Sister Kelsey hernandez     Diabetes Sister Rosita Kramer         Diabetes    Hypertension Sister Rosita Kramer     Stroke Sister Rosita Kramer     Migraines Sister Valery keyshawn         Migraines    Diabetes Brother Ariel williams         Diabetes    Heart failure Brother Ariel kramer         Heart problems        Lifestyle:  Height and Weight:   BMI: Body mass index is 37.12 kg/m².     Other History:     Past Medical History:   Diagnosis Date    Acute deep vein thrombosis (DVT) of left lower extremity, unspecified vein 10/12/2023    pt stated not completely resolved however pcp stopped  froilan 1mo ago and dr kim will restart postop per pt    Allergies     Anemia     Anxiety     Arthritis     Closed displaced comminuted fracture of left patella, initial encounter     Closed displaced comminuted fracture of left patella, sequela     Depression     Digestive disorder     acid reflux    Endometriosis of uterus 1985    Dont remember date    Fibroid     1980    Fibromyalgia     HLD (hyperlipidemia)     Hypertension     Insomnia     Menopause Now    Since age 29    Migraine     Obesity     Pain in left leg     Painful orthopaedic hardware     Plantar fasciitis, right     Pregnancy 10/26/1981    Live birth    SOB (shortness of breath)     STD (sexually transmitted disease)     1987    Stroke 2005    Surgical wound dehiscence, initial encounter 11/23/2023    Systemic lupus erythematosus, organ or system involvement unspecified     Thyroid disease         Past Surgical History:   Procedure Laterality Date    CATARACT EXTRACTION      CHOLECYSTECTOMY      COLONOSCOPY      ESOPHAGOGASTRODUODENOSCOPY      HYSTERECTOMY      INCISION AND DRAINAGE, LOWER EXTREMITY Left 11/23/2023    Procedure: INCISION AND DRAINAGE, LOWER EXTREMITY;  Surgeon: Curtis Kim MD;  Location: [facility];  Service: Orthopedics;  Laterality: Left;  I & D left knee for dehiscence/vascular table/bone foam/3L saline with cysto tubing/Vancomycin/Xperience irrigation/13cm Prevena    LUMBAR SPINE SURGERY      MANIPULATION WITH ANESTHESIA Left 05/29/2024    Procedure: MANIPULATION, WITH ANESTHESIA;  Surgeon: Curtis Kim MD;  Location: [facility] OR;  Service: Orthopedics;  Laterality: Left;    OOPHORECTOMY      OPEN REDUCTION AND INTERNAL FIXATION (ORIF) OF FRACTURE OF PATELLA Left 10/27/2023    Procedure: ORIF, FRACTURE, PATELLA, LEFT;  Surgeon: Curtis Kim MD;  Location: [facility] OR;  Service: Orthopedics;  Laterality: Left;  Supine, vascular bed, bone foam, tourniquet  1st case  Synthes small CCHS, 18g wire, locking patella plates     OPEN REDUCTION AND INTERNAL FIXATION (ORIF) OF FRACTURE OF PATELLA Left 04/09/2024    Procedure: REVISION ORIF, FRACTURE, PATELLA;  Surgeon: Curtis Corona MD;  Location: [facility] OR;  Service: Orthopedics;  Laterality: Left;    OPEN REDUCTION AND INTERNAL FIXATION (ORIF) OF FRACTURE OF PATELLA Left 04/26/2024    Procedure: ORIF, FRACTURE, PATELLA WITH EX_FIX APPLICATION;  Surgeon: Curtis Corona MD;  Location: [facility] OR;  Service: Orthopedics;  Laterality: Left;  Supine, vascular bed, bone foam, tourniquet  Removal of CCHS screws and application synthes ex fix  #5 fiberwire x 3, hewson suture passer and drill    REMOVAL OF EXTERNAL FIXATION DEVICE Left 05/29/2024    Procedure: REMOVAL, EXTERNAL FIXATION DEVICE;  Surgeon: Curtis Corona MD;  Location: [facility] OR;  Service: Orthopedics;  Laterality: Left;  1st case  vascular bed  no sterile setup needed  currette and betadine for pin sites  synthes ex fix removal  pt bringing hinge brace    ROTATOR CUFF REPAIR Bilateral     total of 4 surgeries    THYROIDECTOMY      TOTAL HIP ARTHROPLASTY Right     TOTAL KNEE ARTHROPLASTY Right     c        Social History[1]     Immunization History   Administered Date(s) Administered    COVID-19, MRNA, LN-S, PF (Pfizer) (Purple Cap) 04/29/2021, 05/20/2021        Medications/Allergies:       Current Outpatient Medications   Medication Instructions    albuterol (PROVENTIL) 2.5 mg, 4 times daily PRN    ALPRAZolam (XANAX) 1 mg, Oral, Nightly    ELIQUIS 5 mg, 2 times daily    EPINEPHrine (EPIPEN) 0.3 mg/0.3 mL AtIn 1 each, Once    EScitalopram oxalate (LEXAPRO) 20 mg    estradioL (ESTRACE) 1 mg, Daily    hydrOXYzine (ATARAX) 50 mg, 3 times daily PRN    levothyroxine (SYNTHROID) 100 mcg, Daily    liothyronine (CYTOMEL) 5 mcg, Every morning    losartan-hydrochlorothiazide 100-12.5 mg (HYZAAR) 100-12.5 mg Tab 1 tablet, Daily    metFORMIN (GLUCOPHAGE-XR) 500 mg, Oral, With breakfast    oxyCODONE-acetaminophen (PERCOCET)  mg per tablet  "1 tablet, Every 12 hours PRN    promethazine (PHENERGAN) 25 mg, Every 6 hours PRN    rosuvastatin (CRESTOR) 40 mg, Daily    sumatriptan (IMITREX) 25 MG Tab 1 tablet, Daily PRN       Review of patient's allergies indicates:   Allergen Reactions    Amlodipine Swelling     Per patient     Azithromycin     Levofloxacin     Lisinopril Edema        Review of Systems:      ROS    Constitutional: denies fevers, chills, weight loss  HEENT: denies blurry/double vision, changes in hearing, odynophagia, dysphagia  Respiratory: denies cough, shortness of breath  Cardiovascular: denies palpitations, swelling of the extremities  GI: denies abdominal pain, nausea/vomiting, hematochezia, frequent stools  : denies frequency, dysuria, flank pain, hematuria  Skin: denies new rashes  Neurological: denies muscular/sensory deficiencies, loss of coordination, headaches, memory changes  Endo: denies hair loss/thinning, nervousness, hot flashes, heat/cold intolerance, lumps in the neck area  Heme: denies easy bruising and fatigue  Psychological: denies anxious/depressive moods  Musculoskeletal: denies bony pain, muscle cramps, swollen joints       Objective/Physical Exam   Visit Vitals  /83 (BP Location: Right arm, Patient Position: Sitting)   Pulse 61   Temp 99 °F (37.2 °C) (Oral)   Resp 18   Ht 5' 6" (1.676 m)   Wt 104.3 kg (230 lb)   SpO2 99%   BMI 37.12 kg/m²        Physical Exam    General: The patient is awake, alert and oriented. The patient is well nourished. No acute distress.  Neck: The neck is supple.   Musculoskeletal: The patient has a normal range of motion of her bilateral upper extremities.  Heart: Regular rate and rhythm, no murmurs.   Lung: No increased work of breathing. Clear breath sounds bilaterally.  Lymph nodes: There is no axillary, supraclavicular or cervical lymphadenopathy.   Axilla: no palpable mass in the L axilla.   Breast:  Right:   On inspection there is no skin dimpling, rashes, retraction, erythema " or skin abnormalities. The nipple areolar complex is normal with an everted nipple. The breasts move normally with movement of the pectoralis muscle. On palpation there are no dominant masses. There is no tenderness. There is no nipple discharge.  Left:   On inspection there is no skin dimpling, rashes, retraction, erythema or skin abnormalities. The nipple areolar complex is normal with an everted nipple. The breasts move normally with movement of the pectoralis muscle. On palpation there are no dominant masses. There is no tenderness. There is no nipple discharge.     Assessment and Plan     1. Axillary lump, left  - Ambulatory referral/consult to Breast Surgery  - US Axilla Only (Breast Imaging) Left; Future      Briseyda Torres is a 64 y.o.female who presents with a left axillary mass that she self palpated about a year ago.  She underwent imaging in 2024 which showed about a 1 cm mass in her left axilla.  She underwent biopsy which showed benign fibroadipose tissue.  There are no suspicious findings on her breast or axillary exam and I do not think the mass looks suspicious on her imaging.  Recommend observation and we will plan to repeat her imaging in 6 months to make sure the mass has not grown or changed.  We will have her return to clinic at that time for clinical exam.  She is in agreement with the plan.  All questions answered.    Olinda Wong MD  Breast Surgical Oncology       I spent a total of 45 minutes on the day of the visit.  This includes face to face time and non-face to face time preparing to see the patient (eg, review of tests), obtaining and/or reviewing separately obtained history, documenting clinical information in the electronic or other health record, independently interpreting results and communicating results to the patient/family/caregiver, or care coordinator.         [1]   Social History  Socioeconomic History    Marital status:    Tobacco Use    Smoking status: Never     Smokeless tobacco: Never    Tobacco comments:     Never smoked   Substance and Sexual Activity    Alcohol use: Never    Drug use: Not Currently    Sexual activity: Not Currently     Partners: Male     Birth control/protection: None     Comment: Not currently having sex   Social History Narrative    ** Merged History Encounter **          Social Drivers of Health     Financial Resource Strain: Low Risk  (2/20/2025)    Overall Financial Resource Strain (CARDIA)     Difficulty of Paying Living Expenses: Not very hard   Recent Concern: Financial Resource Strain - Medium Risk (2/6/2025)    Overall Financial Resource Strain (CARDIA)     Difficulty of Paying Living Expenses: Somewhat hard   Food Insecurity: Food Insecurity Present (2/20/2025)    Hunger Vital Sign     Worried About Running Out of Food in the Last Year: Sometimes true     Ran Out of Food in the Last Year: Sometimes true   Transportation Needs: No Transportation Needs (2/20/2025)    PRAPARE - Transportation     Lack of Transportation (Medical): No     Lack of Transportation (Non-Medical): No   Physical Activity: Inactive (2/20/2025)    Exercise Vital Sign     Days of Exercise per Week: 0 days     Minutes of Exercise per Session: 0 min   Stress: No Stress Concern Present (2/20/2025)    [employer] [place] [employer] - Occupational Stress Questionnaire     Feeling of Stress : Only a little   Recent Concern: Stress - Stress Concern Present (2/6/2025)    [place] [place] [employer] - Occupational Stress Questionnaire     Feeling of Stress : To some extent   Housing Stability: High Risk (2/20/2025)    Housing Stability Vital Sign     Unable to Pay for Housing in the Last Year: Yes     Number of Times Moved in the Last Year: 0     Homeless in the Last Year: No

## 2025-02-27 ENCOUNTER — OFFICE VISIT (OUTPATIENT)
Dept: FAMILY MEDICINE | Facility: CLINIC | Age: 65
End: 2025-02-27
Payer: MEDICARE

## 2025-02-27 VITALS
DIASTOLIC BLOOD PRESSURE: 74 MMHG | SYSTOLIC BLOOD PRESSURE: 125 MMHG | OXYGEN SATURATION: 99 % | HEART RATE: 73 BPM | RESPIRATION RATE: 16 BRPM | TEMPERATURE: 98 F | BODY MASS INDEX: 36.95 KG/M2 | WEIGHT: 229.94 LBS | HEIGHT: 66 IN

## 2025-02-27 DIAGNOSIS — F41.9 ANXIETY: ICD-10-CM

## 2025-02-27 DIAGNOSIS — E66.01 SEVERE OBESITY (BMI 35.0-39.9) WITH COMORBIDITY: ICD-10-CM

## 2025-02-27 DIAGNOSIS — E11.9 TYPE 2 DIABETES MELLITUS WITHOUT COMPLICATION, WITHOUT LONG-TERM CURRENT USE OF INSULIN: Primary | ICD-10-CM

## 2025-02-27 PROCEDURE — 99999 PR PBB SHADOW E&M-EST. PATIENT-LVL V: CPT | Mod: PBBFAC,,, | Performed by: NURSE PRACTITIONER

## 2025-02-27 PROCEDURE — 99215 OFFICE O/P EST HI 40 MIN: CPT | Mod: PBBFAC | Performed by: NURSE PRACTITIONER

## 2025-02-27 RX ORDER — ALPRAZOLAM 1 MG/1
1 TABLET ORAL NIGHTLY
Qty: 30 TABLET | Refills: 0 | Status: SHIPPED | OUTPATIENT
Start: 2025-02-27

## 2025-02-27 RX ORDER — METFORMIN HYDROCHLORIDE 500 MG/1
500 TABLET, EXTENDED RELEASE ORAL
Qty: 90 TABLET | Refills: 3 | Status: SHIPPED | OUTPATIENT
Start: 2025-02-27 | End: 2026-02-27

## 2025-03-03 PROBLEM — R22.32 AXILLARY LUMP, LEFT: Status: ACTIVE | Noted: 2025-02-13

## 2025-03-03 NOTE — TELEPHONE ENCOUNTER
----- Message from BOBBI Bustamante sent at 3/3/2025  7:52 AM CST -----  Please inform patient of normal colon cancer screening. Repeat in 3 years.  ----- Message -----  From: Meli Reyes  Sent: 3/3/2025   5:18 AM CST  To: BOBBI Bustamante

## 2025-03-06 ENCOUNTER — PATIENT MESSAGE (OUTPATIENT)
Dept: FAMILY MEDICINE | Facility: CLINIC | Age: 65
End: 2025-03-06
Payer: MEDICARE

## 2025-03-06 ENCOUNTER — PATIENT MESSAGE (OUTPATIENT)
Dept: ORTHOPEDICS | Facility: CLINIC | Age: 65
End: 2025-03-06
Payer: MEDICARE

## 2025-03-06 DIAGNOSIS — M72.2 PLANTAR FASCIITIS: Primary | ICD-10-CM

## 2025-03-07 ENCOUNTER — PATIENT MESSAGE (OUTPATIENT)
Dept: ORTHOPEDICS | Facility: CLINIC | Age: 65
End: 2025-03-07
Payer: MEDICARE

## 2025-03-10 ENCOUNTER — HOSPITAL ENCOUNTER (OUTPATIENT)
Dept: RADIOLOGY | Facility: CLINIC | Age: 65
Discharge: HOME | End: 2025-03-10
Attending: ORTHOPAEDIC SURGERY
Payer: MEDICARE

## 2025-03-10 ENCOUNTER — OFFICE VISIT (OUTPATIENT)
Dept: ORTHOPEDICS | Facility: CLINIC | Age: 65
End: 2025-03-10
Payer: MEDICARE

## 2025-03-10 VITALS
BODY MASS INDEX: 36.95 KG/M2 | HEIGHT: 66 IN | SYSTOLIC BLOOD PRESSURE: 147 MMHG | WEIGHT: 229.94 LBS | DIASTOLIC BLOOD PRESSURE: 82 MMHG | HEART RATE: 86 BPM

## 2025-03-10 DIAGNOSIS — S82.042K CLOSED DISPLACED COMMINUTED FRACTURE OF LEFT PATELLA WITH NONUNION: Primary | ICD-10-CM

## 2025-03-10 DIAGNOSIS — S82.042K CLOSED DISPLACED COMMINUTED FRACTURE OF LEFT PATELLA WITH NONUNION: ICD-10-CM

## 2025-03-10 PROCEDURE — 99213 OFFICE O/P EST LOW 20 MIN: CPT | Mod: ,,, | Performed by: ORTHOPAEDIC SURGERY

## 2025-03-10 PROCEDURE — 73560 X-RAY EXAM OF KNEE 1 OR 2: CPT | Mod: LT,,, | Performed by: ORTHOPAEDIC SURGERY

## 2025-03-10 NOTE — PROGRESS NOTES
Subjective:       Patient ID: Briseyda Torres is a 64 y.o. female.    Chief Complaint   Patient presents with    Left Knee - Follow-up     10 months f/u Lt knee ex fix removal sx 5/29/24, wbat, ambulate with one crutch, reports some improvement, still having some pain, no other complaints,         Patient is here today for follow-up evaluation now 10 months since her removal of ex fix following revision patella tendon repair.  She comes in ambulating with a single crutch.  She has pain and crepitus in the left knee.  She was being worked up for left total knee arthroplasty prior to her patella fracture.  She has multiple operations on the left knee including open reduction internal fixation followed by wound dehiscence and subsequent failure fixation requiring removal of hardware and inferior pole patellectomy.  She has progress to very well and is very happy today.  She states she is able to perform full extension of the left knee against resistance.  She walks with a single crutch just for balance.  She is ready to resume the workup for a left total knee arthroplasty at this time.  She is requesting referral to an arthroplasty specialist.    Follow-up  Pertinent negatives include no abdominal pain, chest pain, chills, congestion, coughing, fever, nausea, neck pain, numbness or vomiting.       Review of Systems   Constitutional: Negative for chills, fever and malaise/fatigue.   HENT:  Negative for congestion and hearing loss.    Eyes:  Negative for visual disturbance.   Cardiovascular:  Negative for chest pain and syncope.   Respiratory:  Negative for cough and shortness of breath.    Hematologic/Lymphatic: Does not bruise/bleed easily.   Skin:  Negative for color change and suspicious lesions.   Musculoskeletal:  Negative for falls and neck pain.   Gastrointestinal:  Negative for abdominal pain, nausea and vomiting.   Genitourinary:  Negative for dysuria and hematuria.   Neurological:  Negative for numbness and  "sensory change.   Psychiatric/Behavioral:  Negative for altered mental status. The patient is not nervous/anxious.         Medications Ordered Prior to Encounter[1]       Objective:      BP (!) 147/82   Pulse 86   Ht 5' 6" (1.676 m)   Wt 104.3 kg (229 lb 15 oz)   BMI 37.11 kg/m²   Physical Exam  Constitutional:       General: She is not in acute distress.     Appearance: Normal appearance. She is not ill-appearing.   HENT:      Head: Normocephalic and atraumatic.      Nose: No congestion.   Eyes:      Extraocular Movements: Extraocular movements intact.   Cardiovascular:      Rate and Rhythm: Normal rate and regular rhythm.      Pulses: Normal pulses.   Pulmonary:      Effort: Pulmonary effort is normal.      Breath sounds: Normal breath sounds.   Abdominal:      General: There is no distension.      Palpations: Abdomen is soft.      Tenderness: There is no abdominal tenderness.   Musculoskeletal:      Comments: Left lower extremity:  Surgical incision of the anterior aspect of the left knee is well healed.  No evidence of any dehiscence, no effusion noted today.  Her ex fix pin sites are all clean and dry.  She is able to perform full extension of the left knee against resistance.  Flexion to 110°.  She has crepitus with range motion of the knee.  No varus or valgus instability is noted.   Skin:     General: Skin is warm and dry.   Neurological:      Mental Status: She is alert and oriented to person, place, and time. Mental status is at baseline.   Psychiatric:         Mood and Affect: Mood normal.         Behavior: Behavior normal.         Thought Content: Thought content normal.         Judgment: Judgment normal.        Body mass index is 37.11 kg/m².    Radiology:   Left knee two views:  She has no internal fixation.  Her patella tendon repair is intact.  She has had a partial patellectomy.  She does have tricompartmental osteoarthritis.      Assessment:         1. 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  I am not an arthroplasty specialist and do not perform total knee arthroplasties.  Given her history of multiple operations on her left knee I felt that she would benefit from evaluation by an arthroplasty specialist and have referred her to my partner Dr. Soren Gruber.  I encouraged her to continue to work on strengthening and range motion and wean from her crutch.  She will follow up with me on an as-needed basis.  She states that she would like to have her knee replaced in early May, I have given her no guarantees that this will be possible, it will depend on Dr. Barrera's schedule and her ability to get an appointment and perform the necessary preoperative workup.  She states that she understands all this.  She will follow up with me on an as-needed basis should any new issues arise in the future.  All questions and concerns were addressed.      This note/OR report was created with the assistance of  voice recognition software or phone  dictation.  There may be transcription errors as a result of using this technology however minimal. Effort has been made to assure accuracy of transcription but any obvious errors or omissions should be clarified with the author of the document.       Curtis Corona MD  Orthopedic Trauma  [facility]      Follow up if symptoms worsen or fail to improve.    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  -     X-Ray Knee 1 or 2 View Left; Future; Expected date: 03/10/2025              Orders Placed This Encounter   Procedures    X-Ray Knee 1 or 2 View Left     Standing Status:   Future     Number of Occurrences:   1     Expected Date:   3/10/2025     Expiration Date:   3/7/2026     May the Radiologist modify the order per protocol to meet the clinical needs of the patient?:   Yes     Release to patient:    Immediate       Future Appointments   Date Time Provider Department Center   3/12/2025 10:00 AM OA US2 OA ULTRA [facility]   3/13/2025  1:00 PM Oanh Chan FNP [facility]   4/1/2025 10:30 AM Giovanni Hagan MD [facility] [place] [facility]   4/24/2025 10:30 AM [facility] Gruber MD [facility] [place]   8/25/2025  1:00 PM [address] [place]   8/25/2025  3:00 PM Olinda Wong MD [facility] [place]                      [1]   Current Outpatient Medications on File Prior to Visit   Medication Sig Dispense Refill    albuterol (PROVENTIL) 2.5 mg /3 mL (0.083 %) nebulizer solution Take 2.5 mg by nebulization 4 (four) times daily as needed for Shortness of Breath or Wheezing.      ALPRAZolam (XANAX) 1 MG tablet Take 1 tablet (1 mg total) by mouth every evening. 30 tablet 0    blood sugar diagnostic Strp To check BG daily, to use with insurance preferred meter 120 each 11    blood-glucose meter kit To check BG daily, to use with insurance preferred meter 1 each 0    ELIQUIS 5 mg Tab Take 5 mg by mouth 2 (two) times daily.      empagliflozin (JARDIANCE) 10 mg tablet Take 1 tablet (10 mg total) by mouth once daily. 30 tablet 1    EPINEPHrine (EPIPEN) 0.3 mg/0.3 mL AtIn Inject 1 each into the muscle once.      EScitalopram oxalate (LEXAPRO) 20 MG tablet Take 20 mg by mouth.      estradioL (ESTRACE) 1 MG tablet Take 1 mg by mouth once daily.      hydrOXYzine (ATARAX) 50 MG tablet Take 50 mg by mouth 3 (three) times daily as needed.      lancets Misc To check BG daily, to use with insurance preferred meter 120 each 11    levothyroxine (SYNTHROID) 100 MCG tablet Take 100 mcg by mouth once daily.      liothyronine (CYTOMEL) 5 MCG Tab Take 5 mcg by mouth every morning.      losartan-hydrochlorothiazide 100-12.5 mg (HYZAAR) 100-12.5 mg Tab Take 1 tablet by mouth once daily.      oxyCODONE-acetaminophen (PERCOCET)  mg per tablet Take 1 tablet by mouth every 12 (twelve) hours as  needed.      promethazine (PHENERGAN) 25 MG tablet Take 25 mg by mouth every 6 (six) hours as needed for Nausea.      rosuvastatin (CRESTOR) 40 MG Tab Take 40 mg by mouth once daily.      sumatriptan (IMITREX) 25 MG Tab Take 1 tablet by mouth daily as needed for Migraine.       No current facility-administered medications on file prior to visit.

## 2025-03-12 ENCOUNTER — HOSPITAL ENCOUNTER (OUTPATIENT)
Dept: RADIOLOGY | Facility: HOSPITAL | Age: 65
Discharge: HOME | End: 2025-03-12
Attending: ORTHOPAEDIC SURGERY
Payer: MEDICARE

## 2025-03-12 DIAGNOSIS — R60.9 EDEMA, UNSPECIFIED TYPE: ICD-10-CM

## 2025-03-12 PROCEDURE — 93971 EXTREMITY STUDY: CPT | Mod: TC,LT

## 2025-03-13 ENCOUNTER — OFFICE VISIT (OUTPATIENT)
Dept: FAMILY MEDICINE | Facility: CLINIC | Age: 65
End: 2025-03-13
Payer: MEDICARE

## 2025-03-13 VITALS
BODY MASS INDEX: 37.11 KG/M2 | RESPIRATION RATE: 16 BRPM | OXYGEN SATURATION: 98 % | SYSTOLIC BLOOD PRESSURE: 117 MMHG | DIASTOLIC BLOOD PRESSURE: 74 MMHG | HEIGHT: 66 IN | HEART RATE: 84 BPM | TEMPERATURE: 98 F

## 2025-03-13 DIAGNOSIS — I82.4Y2 ACUTE DEEP VEIN THROMBOSIS (DVT) OF PROXIMAL VEIN OF LEFT LOWER EXTREMITY: ICD-10-CM

## 2025-03-13 DIAGNOSIS — E11.9 TYPE 2 DIABETES MELLITUS WITHOUT COMPLICATION, WITHOUT LONG-TERM CURRENT USE OF INSULIN: Primary | ICD-10-CM

## 2025-03-13 DIAGNOSIS — R25.2 FOOT SPASMS: ICD-10-CM

## 2025-03-13 DIAGNOSIS — M32.8 OTHER FORMS OF SYSTEMIC LUPUS ERYTHEMATOSUS, UNSPECIFIED ORGAN INVOLVEMENT STATUS: ICD-10-CM

## 2025-03-13 DIAGNOSIS — R79.9 ABNORMAL FINDING OF BLOOD CHEMISTRY, UNSPECIFIED: ICD-10-CM

## 2025-03-13 DIAGNOSIS — M41.9 SCOLIOSIS OF LUMBAR SPINE, UNSPECIFIED SCOLIOSIS TYPE: ICD-10-CM

## 2025-03-13 LAB — GLUCOSE SERPL-MCNC: 91 MG/DL (ref 70–110)

## 2025-03-13 PROCEDURE — 82962 GLUCOSE BLOOD TEST: CPT | Mod: PBBFAC | Performed by: NURSE PRACTITIONER

## 2025-03-13 PROCEDURE — 99999PBSHW POCT GLUCOSE, HAND-HELD DEVICE: Mod: PBBFAC,,,

## 2025-03-13 PROCEDURE — 99215 OFFICE O/P EST HI 40 MIN: CPT | Mod: PBBFAC | Performed by: NURSE PRACTITIONER

## 2025-03-13 PROCEDURE — 99999 PR PBB SHADOW E&M-EST. PATIENT-LVL V: CPT | Mod: PBBFAC,,, | Performed by: NURSE PRACTITIONER

## 2025-03-13 NOTE — PROGRESS NOTES
Family Medicine    Patient ID: [id number]     Chief Complaint: glucose check      HPI:     Briseyda Torres is here today for     Past Medical History:   Diagnosis Date    Acute deep vein thrombosis (DVT) of left lower extremity, unspecified vein 10/12/2023    pt stated not completely resolved however pcp stopped eliquis 1mo ago and dr kim will restart postop per pt    Allergies     Anemia     Anxiety     Arthritis     Closed displaced comminuted fracture of left patella, initial encounter     Closed displaced comminuted fracture of left patella, sequela     Depression     Digestive disorder     acid reflux    Endometriosis of uterus 1985    Dont remember date    Fibroid     1980    Fibromyalgia     HLD (hyperlipidemia)     Hypertension     15 plus yrs    Insomnia     Menopause Now    Since age 29    Migraine     Obesity     Pain in left leg     Painful orthopaedic hardware     Plantar fasciitis, right     Pregnancy 10/26/1981    Live birth    SOB (shortness of breath)     STD (sexually transmitted disease)     1987    Stroke 2005    Surgical wound dehiscence, initial encounter 11/23/2023    Systemic lupus erythematosus, organ or system involvement unspecified     Thyroid disease     No thyroid        Past Surgical History:   Procedure Laterality Date    CATARACT EXTRACTION      CHOLECYSTECTOMY      COLONOSCOPY      ESOPHAGOGASTRODUODENOSCOPY      HYSTERECTOMY      INCISION AND DRAINAGE, LOWER EXTREMITY Left 11/23/2023    Procedure: INCISION AND DRAINAGE, LOWER EXTREMITY;  Surgeon: Curtis Kim MD;  Location: [facility];  Service: Orthopedics;  Laterality: Left;  I & D left knee for dehiscence/vascular table/bone foam/3L saline with cysto tubing/Vancomycin/Xperience irrigation/13cm Prevena    LUMBAR SPINE SURGERY      MANIPULATION WITH ANESTHESIA Left 05/29/2024    Procedure: MANIPULATION, WITH ANESTHESIA;  Surgeon: Curtis Kim MD;  Location: [facility] OR;  Service: Orthopedics;  Laterality: Left;     OOPHORECTOMY      OPEN REDUCTION AND INTERNAL FIXATION (ORIF) OF FRACTURE OF PATELLA Left 10/27/2023    Procedure: ORIF, FRACTURE, PATELLA, LEFT;  Surgeon: Curtis Corona MD;  Location: [facility] OR;  Service: Orthopedics;  Laterality: Left;  Supine, vascular bed, bone foam, tourniquet  1st case  Synthes small CCHS, 18g wire, locking patella plates    OPEN REDUCTION AND INTERNAL FIXATION (ORIF) OF FRACTURE OF PATELLA Left 04/09/2024    Procedure: REVISION ORIF, FRACTURE, PATELLA;  Surgeon: Curtis Corona MD;  Location: [facility] OR;  Service: Orthopedics;  Laterality: Left;    OPEN REDUCTION AND INTERNAL FIXATION (ORIF) OF FRACTURE OF PATELLA Left 04/26/2024    Procedure: ORIF, FRACTURE, PATELLA WITH EX_FIX APPLICATION;  Surgeon: Curtis Corona MD;  Location: [facility] OR;  Service: Orthopedics;  Laterality: Left;  Supine, vascular bed, bone foam, tourniquet  Removal of CCHS screws and application synthes ex fix  #5 fiberwire x 3, hewson suture passer and drill    REMOVAL OF EXTERNAL FIXATION DEVICE Left 05/29/2024    Procedure: REMOVAL, EXTERNAL FIXATION DEVICE;  Surgeon: Curtis Corona MD;  Location: [facility] OR;  Service: Orthopedics;  Laterality: Left;  1st case  vascular bed  no sterile setup needed  currette and betadine for pin sites  synthes ex fix removal  pt bringing hinge brace    ROTATOR CUFF REPAIR Bilateral     total of 4 surgeries    THYROIDECTOMY      TOTAL HIP ARTHROPLASTY Right     TOTAL KNEE ARTHROPLASTY Right     c        Social History     Tobacco Use    Smoking status: Never    Smokeless tobacco: Never    Tobacco comments:     Never smoked   Substance and Sexual Activity    Alcohol use: Never    Drug use: Not Currently    Sexual activity: Not Currently     Partners: Male     Birth control/protection: None     Comment: Not currently having sex        Current Outpatient Medications   Medication Instructions    albuterol (PROVENTIL) 2.5 mg, 4 times daily PRN    ALPRAZolam (XANAX) 1 mg, Oral, Nightly     blood sugar diagnostic Strp To check BG daily, to use with insurance preferred meter    blood-glucose meter kit To check BG daily, to use with insurance preferred meter    ELIQUIS 5 mg, 2 times daily    empagliflozin (JARDIANCE) 10 mg, Oral, Daily    EPINEPHrine (EPIPEN) 0.3 mg/0.3 mL AtIn 1 each, Once    EScitalopram oxalate (LEXAPRO) 20 mg    estradioL (ESTRACE) 1 mg, Daily    hydrOXYzine (ATARAX) 50 mg, 3 times daily PRN    lancets Misc To check BG daily, to use with insurance preferred meter    levothyroxine (SYNTHROID) 100 mcg, Daily    liothyronine (CYTOMEL) 5 mcg, Every morning    losartan-hydrochlorothiazide 100-12.5 mg (HYZAAR) 100-12.5 mg Tab 1 tablet, Daily    oxyCODONE-acetaminophen (PERCOCET)  mg per tablet 1 tablet, Every 12 hours PRN    promethazine (PHENERGAN) 25 mg, Every 6 hours PRN    rosuvastatin (CRESTOR) 40 mg, Daily    sumatriptan (IMITREX) 25 MG Tab 1 tablet, Daily PRN       Review of patient's allergies indicates:   Allergen Reactions    Amlodipine Swelling     Per patient     Azithromycin     Levofloxacin     Lisinopril Edema        Patient Care Team:  Oanh Chan FNP as PCP - General (Family Medicine)  Curtis Corona MD as Consulting Physician (Orthopedic Surgery)  Olinda Wong MD as Consulting Physician (Breast Surgery)  [facility] as Chronic  (Pain Medicine)     Subjective:     Review of Systems   Constitutional:  Negative for activity change and unexpected weight change.   HENT:  Negative for hearing loss, rhinorrhea and trouble swallowing.    Eyes:  Negative for discharge and visual disturbance.   Respiratory:  Negative for chest tightness and wheezing.    Cardiovascular:  Negative for chest pain and palpitations.   Gastrointestinal:  Negative for blood in stool, constipation, diarrhea and vomiting.   Endocrine: Positive for polyuria. Negative for polydipsia.   Genitourinary:  Positive for difficulty urinating. Negative for dysuria, hematuria  "and menstrual problem.   Musculoskeletal:  Positive for arthralgias. Negative for joint swelling and neck pain.   Neurological:  Positive for weakness and headaches.   Psychiatric/Behavioral:  Negative for confusion and dysphoric mood.        12 point review of systems conducted, negative except as stated in the history of present illness. See HPI for details.    Objective:     Visit Vitals  /74   Pulse 84   Temp 98 °F (36.7 °C) (Temporal)   Resp 16   Ht 5' 6" (1.676 m)   SpO2 98%   BMI 37.11 kg/m²       Physical Exam  Vitals and nursing note reviewed.   Constitutional:       General: She is not in acute distress.     Appearance: She is not ill-appearing.   HENT:      Head: Normocephalic and atraumatic.      Mouth/Throat:      Mouth: Mucous membranes are moist.      Pharynx: Oropharynx is clear.   Eyes:      General: No scleral icterus.     Extraocular Movements: Extraocular movements intact.      Conjunctiva/sclera: Conjunctivae normal.      Pupils: Pupils are equal, round, and reactive to light.   Neck:      Vascular: No carotid bruit.   Cardiovascular:      Rate and Rhythm: Normal rate and regular rhythm.      Heart sounds: No murmur heard.     No friction rub. No gallop.   Pulmonary:      Effort: Pulmonary effort is normal. No respiratory distress.      Breath sounds: Normal breath sounds. No wheezing, rhonchi or rales.   Abdominal:      General: Abdomen is flat. Bowel sounds are normal. There is no distension.      Palpations: Abdomen is soft. There is no mass.      Tenderness: There is no abdominal tenderness.   Musculoskeletal:         General: Normal range of motion.      Cervical back: Normal range of motion and neck supple.        Legs:       Comments: Scars   Skin:     General: Skin is warm and dry.   Neurological:      General: No focal deficit present.      Mental Status: She is alert.   Psychiatric:         Mood and Affect: Mood normal.         Labs Reviewed:   Labs reviewed with patient, " verbalized understanding.  Chemistry:  Lab Results   Component Value Date     02/14/2025    K 3.6 02/14/2025    BUN 16 02/14/2025    CREATININE 0.84 02/14/2025    EGFRNORACEVR 78 02/14/2025    GLUCOSE 103 02/14/2025    CALCIUM 9.1 02/14/2025    ALKPHOS 105 02/14/2025    LABPROT 7.7 02/14/2025    ALBUMIN 4.7 02/14/2025    AST 42 (H) 02/14/2025    ALT 47 (H) 02/14/2025    MG 2.10 11/27/2023    PHOS 3.6 11/23/2023    TEITFLXP43ZA 29 (L) 02/14/2025    TSH 0.445 02/14/2025    HFBAQI8LBRE 0.93 02/14/2025        Lab Results   Component Value Date    HGBA1C 6.4 (H) 02/14/2025        Hematology:  Lab Results   Component Value Date    WBC 7.05 02/14/2025    HGB 13.2 02/14/2025    HCT 39.4 02/14/2025     02/14/2025       Lipid Panel:  Lab Results   Component Value Date    CHOL 136 02/14/2025    HDL 63 (H) 02/14/2025    TRIG 105 02/14/2025        Urine:  Lab Results   Component Value Date    APPEARANCEUA Clear 02/14/2025    SGUA >=1.030 02/14/2025    PROTEINUA Negative 02/14/2025    KETONESUA Negative 02/14/2025    LEUKOCYTESUR Negative 02/14/2025        Assessment:       ICD-10-CM ICD-9-CM   1. Type 2 diabetes mellitus without complication, without long-term current use of insulin  E11.9 250.00   2. Acute deep vein thrombosis (DVT) of proximal vein of left lower extremity  I82.4Y2 453.41   3. Foot spasms  R25.2 781.0   4. Abnormal finding of blood chemistry, unspecified  R79.9 790.6   5. Scoliosis of lumbar spine, unspecified scoliosis type  M41.9 737.30   6. Other forms of systemic lupus erythematosus, unspecified organ involvement status  M32.8 710.0        Plan:     1. Type 2 diabetes mellitus without complication, without long-term current use of insulin  Overview:  A1c 6.5, Advised starting metformin, 500mg BID.    Orders:  -     POCT Glucose, Hand-Held Device    2. Acute deep vein thrombosis (DVT) of proximal vein of left lower extremity  Overview:  DVT left lower extremity and on Eliquis.    Assessment &  Plan:  Has resolved, will refer to [facility].    Orders:  -     Ambulatory referral/consult to Cardiology    3. Foot spasms  Overview:  Right foot spasm, toe all toes, open and freeze, has a referral to podiatry. She will return to Dr. Butterfield. Appointment is set.      4. Abnormal finding of blood chemistry, unspecified  Overview:  FBG is .      5. Scoliosis of lumbar spine, unspecified scoliosis type  Overview:  Notes scoliosis lumbar area, does not remember DEY, will order xray.    Orders:  -     X-Ray Scoliosis Complete; Future; Expected date: 03/13/2025    6. Other forms of systemic lupus erythematosus, unspecified organ involvement status  Overview:  History of SLE.  Patient was seeing MD in [place].    Assessment & Plan:  Has history of SLE, is followed by MD in [facility].            Follow up in about 4 weeks (around 4/10/2025) for add follow up. In addition to their scheduled follow up, the patient has also been instructed to follow up on as needed basis.     Future Appointments   Date Time Provider Department Center   4/1/2025 10:30 AM Giovanni Hagan MD [facility]   4/24/2025 10:30 AM Soren Gruber MD [facility] [place]   8/25/2025  1:00 PM [address] [place]   8/25/2025  3:00 PM Olinda Wong MD [facility] [place]

## 2025-03-17 ENCOUNTER — HOSPITAL ENCOUNTER (OUTPATIENT)
Dept: RADIOLOGY | Facility: HOSPITAL | Age: 65
Discharge: HOME | End: 2025-03-17
Attending: NURSE PRACTITIONER
Payer: MEDICARE

## 2025-03-17 DIAGNOSIS — M41.9 SCOLIOSIS OF LUMBAR SPINE, UNSPECIFIED SCOLIOSIS TYPE: ICD-10-CM

## 2025-03-17 PROCEDURE — 72082 X-RAY EXAM ENTIRE SPI 2/3 VW: CPT | Mod: TC

## 2025-03-18 ENCOUNTER — PATIENT MESSAGE (OUTPATIENT)
Dept: FAMILY MEDICINE | Facility: CLINIC | Age: 65
End: 2025-03-18
Payer: MEDICARE

## 2025-03-19 ENCOUNTER — PATIENT MESSAGE (OUTPATIENT)
Dept: FAMILY MEDICINE | Facility: CLINIC | Age: 65
End: 2025-03-19
Payer: MEDICARE

## 2025-03-19 NOTE — TELEPHONE ENCOUNTER
Patient called LM, she returned call and discussed that her DEY score is 4.1 and 9.6 and anterolisthesis at L4/L5. 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

## 2025-03-24 ENCOUNTER — PATIENT MESSAGE (OUTPATIENT)
Dept: FAMILY MEDICINE | Facility: CLINIC | Age: 65
End: 2025-03-24
Payer: MEDICARE

## 2025-03-24 DIAGNOSIS — M32.8 OTHER FORMS OF SYSTEMIC LUPUS ERYTHEMATOSUS, UNSPECIFIED ORGAN INVOLVEMENT STATUS: ICD-10-CM

## 2025-03-24 DIAGNOSIS — E78.5 HYPERLIPIDEMIA, UNSPECIFIED HYPERLIPIDEMIA TYPE: Primary | ICD-10-CM

## 2025-03-24 RX ORDER — CLINDAMYCIN PHOSPHATE 11.9 MG/ML
SOLUTION TOPICAL 2 TIMES DAILY
Qty: 60 ML | Refills: 1 | Status: SHIPPED | OUTPATIENT
Start: 2025-03-24

## 2025-03-24 RX ORDER — ROSUVASTATIN CALCIUM 40 MG/1
40 TABLET, COATED ORAL DAILY
Qty: 90 TABLET | Refills: 3 | Status: SHIPPED | OUTPATIENT
Start: 2025-03-24

## 2025-03-26 ENCOUNTER — PATIENT MESSAGE (OUTPATIENT)
Dept: ADMINISTRATIVE | Facility: OTHER | Age: 65
End: 2025-03-26
Payer: MEDICARE

## 2025-03-28 NOTE — PROGRESS NOTES
Chief Complaint     Gynecologic Exam (Pt is a new gyn here for establishment of care. C/o recurrent yeast infections. Cologuard 2025- negative )    HPI:     Patient is a 64 y.o.  presents today referred by Oanh Chan to establish Gyn care. Hx of hysterectomy due to endometriosis in her late 20s. Hx of benign breast BX, done with Dr Hernandez in [place], [facility] appt in 5 months. C/o recurrent yeast infections, under breast. Discontinued Jardiance recently for prediabetes. Cologuard 2025 negative. Hx of Lupus, will be undergoing knee replacement soon.     C/o frequent and urgency with urination and several times per night nocturia. Denies dysuria.     Patient is currently on estradiol estrogen replacement and says she has been on this for years.  She does have occasional hot flashes.  Importantly she has had a history of 2 DVTs recently and a history of a CVA in     Gyn History:    Menstrual History  Cycle: No  Menarche Age: 1 years  No Cycle Reason: (!) Surgical  Surgical Reason: hysterectomy    Menopause  Menopause Age: 1 years  Post Menopausal Bleeding: No  Hormone Replacement Therapy: Yes (Estradiol 1 mg)    Pap History  History of Abnormal Pap: No  HPV Vaccine Completed: No    [place]  Sexually Active: No  STI History: Yes  STI Type: Syphilis  Contraception: No    Breast History  Last Breast Imaging Date: Yes (24- bx recomended)  History of Breast Biopsy: Yes (2025- L breast bx- benign)  Results of Last Biopsy: Yes (Benign)          Past Medical History:   Diagnosis Date    Acute deep vein thrombosis (DVT) of left lower extremity, unspecified vein 10/12/2023    pt stated not completely resolved however pcp stopped eliquis 1mo ago and dr kim will restart postop per pt    Allergies     Amenorrhea     Anemia     Anxiety     Arthritis     Closed displaced comminuted fracture of left patella, initial encounter     Closed displaced comminuted fracture of left patella, sequela      Depression     Digestive disorder     acid reflux    Endometriosis of uterus 1985    Dont remember date    Fibroid     1980    Fibromyalgia     Heart murmur     HLD (hyperlipidemia)     Hypertension     15 plus yrs    Insomnia     Menopause Now    Since age 29    Migraine     Obesity     Pain in left leg     Painful orthopaedic hardware     Plantar fasciitis, right     Pregnancy 10/26/1981    Live birth    SOB (shortness of breath)     STD (sexually transmitted disease)     1987    Stroke 2005    Surgical wound dehiscence, initial encounter 11/23/2023    Systemic lupus erythematosus, organ or system involvement unspecified     Thyroid disease     No thyroid       Past Surgical History:   Procedure Laterality Date    CATARACT EXTRACTION      CHOLECYSTECTOMY      COLONOSCOPY      ESOPHAGOGASTRODUODENOSCOPY      HYSTERECTOMY      MEHRAN BSO    INCISION AND DRAINAGE, LOWER EXTREMITY Left 11/23/2023    Procedure: INCISION AND DRAINAGE, LOWER EXTREMITY;  Surgeon: Curtis Corona MD;  Location: [facility];  Service: Orthopedics;  Laterality: Left;  I & D left knee for dehiscence/vascular table/bone foam/3L saline with cysto tubing/Vancomycin/Xperience irrigation/13cm Prevena    LUMBAR SPINE SURGERY      MANIPULATION WITH ANESTHESIA Left 05/29/2024    Procedure: MANIPULATION, WITH ANESTHESIA;  Surgeon: Curtis Corona MD;  Location: [facility];  Service: Orthopedics;  Laterality: Left;    OOPHORECTOMY      OPEN REDUCTION AND INTERNAL FIXATION (ORIF) OF FRACTURE OF PATELLA Left 10/27/2023    Procedure: ORIF, FRACTURE, PATELLA, LEFT;  Surgeon: Curtis Corona MD;  Location: [facility] OR;  Service: Orthopedics;  Laterality: Left;  Supine, vascular bed, bone foam, tourniquet  1st case  Synthes small CCHS, 18g wire, locking patella plates    OPEN REDUCTION AND INTERNAL FIXATION (ORIF) OF FRACTURE OF PATELLA Left 04/09/2024    Procedure: REVISION ORIF, FRACTURE, PATELLA;  Surgeon: Curtis Corona MD;  Location: [facility];  Service:  Orthopedics;  Laterality: Left;    OPEN REDUCTION AND INTERNAL FIXATION (ORIF) OF FRACTURE OF PATELLA Left 2024    Procedure: ORIF, FRACTURE, PATELLA WITH EX_FIX APPLICATION;  Surgeon: Curtis Corona MD;  Location: [facility] OR;  Service: Orthopedics;  Laterality: Left;  Supine, vascular bed, bone foam, tourniquet  Removal of CCHS screws and application synthes ex fix  #5 fiberwire x 3, hewson suture passer and drill    REMOVAL OF EXTERNAL FIXATION DEVICE Left 2024    Procedure: REMOVAL, EXTERNAL FIXATION DEVICE;  Surgeon: Curtis Corona MD;  Location: [facility] OR;  Service: Orthopedics;  Laterality: Left;  1st case  vascular bed  no sterile setup needed  currette and betadine for pin sites  synthes ex fix removal  pt bringing hinge brace    ROTATOR CUFF REPAIR Bilateral     total of 4 surgeries    THYROIDECTOMY      TOTAL HIP ARTHROPLASTY Right     TOTAL KNEE ARTHROPLASTY Right     c       Family History   Problem Relation Name Age of Onset    Diabetes Mother Blanche lorden     Cancer Mother Blanche pacheco         Lung cancer    Heart attack Mother Blanche lorden     Heart attack Father Alberto Hernandez     Diabetes Father Alberto Hernandez     Cancer Father Alberto Hernandez         Lung cancer    Rheum arthritis Sister Kelsey hernandez         Lupus    Osteoarthritis Sister Kelsey hernandez     Hypertension Sister Kelsey hernandez     Rashes / Skin problems Sister Kelsey hernandez     Diabetes Sister Rosita Kramer         Diabetes    Hypertension Sister Rosita Kramer     Stroke Sister Rosita Kramer     Migraines Sister Valery keyshawn         Migraines    Diabetes Brother Ariel kramer         Diabetes    Heart failure Brother Ariel kramer         Heart problems       OB History          3    Para   1    Term   1            AB   2    Living             SAB   2    IAB        Ectopic        Multiple        Live Births   1                 Medications Ordered Prior to Encounter[1]    Review  "of Systems:       Review of Systems   Constitutional:  Negative for appetite change, chills, fatigue, fever and unexpected weight change.   Eyes:  Negative for visual disturbance.   Respiratory:  Negative for cough, shortness of breath and wheezing.    Cardiovascular:  Negative for chest pain, palpitations and leg swelling.   Gastrointestinal:  Negative for abdominal pain, bloating, blood in stool, constipation, diarrhea, nausea, vomiting, reflux and fecal incontinence.   Endocrine: Negative for hair loss and hot flashes.   Genitourinary:  Positive for frequency. Negative for bladder incontinence, decreased libido, dysmenorrhea, dyspareunia, dysuria, flank pain, genital sores, hematuria, hot flashes, menorrhagia, menstrual problem, pelvic pain, urgency, vaginal bleeding, vaginal discharge, vaginal pain, urinary incontinence, postcoital bleeding, postmenopausal bleeding, vaginal dryness and vaginal odor.   Integumentary:  Negative for rash, acne, hair changes, breast mass, nipple discharge, breast skin changes and breast tenderness.   Neurological:  Negative for headaches.   Psychiatric/Behavioral:  Negative for depression.    Breast: Negative for asymmetry, breast self exam, lump, mass, mastodynia, nipple discharge, skin changes and tenderness       Physical Exam:    /74 (BP Location: Right arm, Patient Position: Sitting)   Ht 5' 6" (1.676 m)   BMI 37.11 kg/m²     Physical Exam   Chaperone: present.    General appearance: - healthy, well-nourished and well-developed    Psychiatric: Orientation to time, place and person. Normal mood and affect and active, alert    Skin:  Appearance: no rashes or lesions.    Neck:  Neck: supple, FROM, trachea midline. and no masses  Thyroid: no enlargement or nodules and non-tender.      Cardiovascular  Auscultation: RRR and no murmur.  Peripheral Vascular: no varicosities, LLE edema, RLE edema, calf tenderness, and palpable cord and pedal pulses intact.    Lungs:  Respiratory " effort: no intercostal retractions or accessory muscle usage.  Auscultation: no wheezing, rales/crackles, or rhonchi and clear to auscultation.    Breast:  Inspection/Palpation:  Erythema and dark discoloration of the skin underneath the left breast.  otherwise tenderness, discrete/distinct masses, skin changes, or abnormal secretions. Nipple appearance normal.    Abdomen:  Auscultation/Inspection/Palpation: no hepatomegaly, splenomegaly, masses , tenderness or CVA tenderness and soft, non distended bowel sounds preset.   Hernia: no palpable hernias.    Female Genitalia:  Vulva: no masses, tenderness or lesions  Bladder/Urethra: no urethral discharge or mass, normal meatus, bladder non-distended.  Vagina: no tenderness,erythema, cystocele, rectocele, abnormal vaginal discharge,or vesicle(s) or ulcers    Lymph Nodes:  Palpation: non tender submandibular nodes, axillary nodes, or inguinal nodes.    Rectal Exam:  Rectum: normal perianal skin      Assessment:   1. Encounter for well woman exam with abnormal findings    2. OAB (overactive bladder)  -     oxybutynin (DITROPAN-XL) 5 MG TR24; Take 1 tablet (5 mg total) by mouth once daily.  Dispense: 30 tablet; Refill: 11    3. History of endometriosis  Overview:  G4T1, total hyst 1984, heavy menses, was 18 at start of menses due to painful cycle.    Orders:  -     Ambulatory referral/consult to Obstetrics / Gynecology    4. BMI 37.0-37.9, adult    5. Vagina, candidiasis  -     fluconazole (DIFLUCAN) 100 MG tablet; Take 2 tablets (200 mg total) by mouth every 48 hours. for 14 days  Dispense: 7 tablet; Refill: 0    6. Systemic lupus erythematosus, unspecified SLE type, unspecified organ involvement status    7. Dermatophytosis             Plan:     Breast Self-awareness  Recommend annual mammogram  Keep f/u with breast specialist  Recommend exercise at least 3 times weekly  Healthy, balanced diet  Keep yearly follow up with PCP    Discussed history and findings as well as  multiple etiologies for urine loss.   Explained that if symptoms are primarily associated with urgency we often empirically treat them with antispasmodics.   If this treatment fails or if her symptoms are usually related to Valsalva then we recommend urodynamic studies to determine if surgery is her best option.   Will do trial of Oxybutynin 5mg for OAB    Discussed HRT and potential risks and side effects.   Given her history of recurrent venous thromboembolism and stroke I would recommend discontinuation of hormone replacement therapy at this time.    Rx Diflucan 200mg q other day x2 weeks     RTC 1 month medication f/u     This note was transcribed by Nelli Freedman. There may be transcription errors as a result, however minimal. Effort has been made to ensure accuracy of transcription, but any obvious errors or omissions should be clarified with the author of the document.       I agree with the above documentation.                [1]   Current Outpatient Medications on File Prior to Visit   Medication Sig Dispense Refill    albuterol (PROVENTIL) 2.5 mg /3 mL (0.083 %) nebulizer solution Take 2.5 mg by nebulization 4 (four) times daily as needed for Shortness of Breath or Wheezing.      ALPRAZolam (XANAX) 1 MG tablet Take 1 tablet (1 mg total) by mouth every evening. 30 tablet 0    clindamycin (CLEOCIN T) 1 % external solution Apply topically 2 (two) times daily. 60 mL 1    EPINEPHrine (EPIPEN) 0.3 mg/0.3 mL AtIn Inject 1 each into the muscle once.      EScitalopram oxalate (LEXAPRO) 20 MG tablet Take 20 mg by mouth.      estradioL (ESTRACE) 1 MG tablet Take 1 mg by mouth once daily.      hydrOXYzine (ATARAX) 50 MG tablet Take 50 mg by mouth 3 (three) times daily as needed.      levothyroxine (SYNTHROID) 100 MCG tablet Take 100 mcg by mouth once daily.      liothyronine (CYTOMEL) 5 MCG Tab Take 5 mcg by mouth every morning.      losartan-hydrochlorothiazide 100-12.5 mg (HYZAAR) 100-12.5 mg Tab Take 1 tablet by  mouth once daily.      oxyCODONE-acetaminophen (PERCOCET)  mg per tablet Take 1 tablet by mouth every 12 (twelve) hours as needed.      promethazine (PHENERGAN) 25 MG tablet Take 25 mg by mouth every 6 (six) hours as needed for Nausea.      rosuvastatin (CRESTOR) 40 MG Tab Take 1 tablet (40 mg total) by mouth once daily. 90 tablet 3    sumatriptan (IMITREX) 25 MG Tab Take 1 tablet by mouth daily as needed for Migraine.      ELIQUIS 5 mg Tab Take 5 mg by mouth 2 (two) times daily. (Patient not taking: Reported on 4/1/2025)      [DISCONTINUED] blood sugar diagnostic Strp To check BG daily, to use with insurance preferred meter 120 each 11    [DISCONTINUED] blood-glucose meter kit To check BG daily, to use with insurance preferred meter 1 each 0    [DISCONTINUED] empagliflozin (JARDIANCE) 10 mg tablet Take 1 tablet (10 mg total) by mouth once daily. 30 tablet 1    [DISCONTINUED] lancets Misc To check BG daily, to use with insurance preferred meter 120 each 11     No current facility-administered medications on file prior to visit.

## 2025-04-01 ENCOUNTER — OFFICE VISIT (OUTPATIENT)
Dept: OBSTETRICS AND GYNECOLOGY | Facility: CLINIC | Age: 65
End: 2025-04-01
Payer: MEDICARE

## 2025-04-01 VITALS — DIASTOLIC BLOOD PRESSURE: 74 MMHG | SYSTOLIC BLOOD PRESSURE: 128 MMHG | HEIGHT: 66 IN | BODY MASS INDEX: 37.11 KG/M2

## 2025-04-01 DIAGNOSIS — Z01.411 ENCOUNTER FOR WELL WOMAN EXAM WITH ABNORMAL FINDINGS: Primary | ICD-10-CM

## 2025-04-01 DIAGNOSIS — Z87.42 HISTORY OF ENDOMETRIOSIS: ICD-10-CM

## 2025-04-01 DIAGNOSIS — N32.81 OAB (OVERACTIVE BLADDER): ICD-10-CM

## 2025-04-01 DIAGNOSIS — M32.9 SYSTEMIC LUPUS ERYTHEMATOSUS, UNSPECIFIED SLE TYPE, UNSPECIFIED ORGAN INVOLVEMENT STATUS: ICD-10-CM

## 2025-04-01 DIAGNOSIS — B35.9 DERMATOPHYTOSIS: ICD-10-CM

## 2025-04-01 DIAGNOSIS — B37.31 VAGINA, CANDIDIASIS: ICD-10-CM

## 2025-04-01 RX ORDER — OXYBUTYNIN CHLORIDE 5 MG/1
5 TABLET, EXTENDED RELEASE ORAL DAILY
Qty: 30 TABLET | Refills: 11 | Status: SHIPPED | OUTPATIENT
Start: 2025-04-01 | End: 2026-04-01

## 2025-04-01 RX ORDER — FLUCONAZOLE 100 MG/1
200 TABLET ORAL
Qty: 7 TABLET | Refills: 0 | Status: SHIPPED | OUTPATIENT
Start: 2025-04-01 | End: 2025-04-15

## 2025-04-02 ENCOUNTER — PATIENT MESSAGE (OUTPATIENT)
Dept: FAMILY MEDICINE | Facility: CLINIC | Age: 65
End: 2025-04-02
Payer: MEDICARE

## 2025-04-02 DIAGNOSIS — F41.9 ANXIETY: ICD-10-CM

## 2025-04-03 RX ORDER — ALPRAZOLAM 1 MG/1
1 TABLET ORAL NIGHTLY
Qty: 30 TABLET | Refills: 0 | Status: SHIPPED | OUTPATIENT
Start: 2025-04-03

## 2025-04-16 ENCOUNTER — PATIENT MESSAGE (OUTPATIENT)
Dept: FAMILY MEDICINE | Facility: CLINIC | Age: 65
End: 2025-04-16
Payer: MEDICARE

## 2025-04-22 ENCOUNTER — TELEPHONE (OUTPATIENT)
Dept: RHEUMATOLOGY | Facility: CLINIC | Age: 65
End: 2025-04-22
Payer: MEDICARE

## 2025-04-22 NOTE — TELEPHONE ENCOUNTER
Referral dept called to follow up with patient referral. Pt was called to schedule appt and pt declined scheduling.

## 2025-04-24 ENCOUNTER — OFFICE VISIT (OUTPATIENT)
Dept: ORTHOPEDICS | Facility: CLINIC | Age: 65
End: 2025-04-24
Payer: MEDICARE

## 2025-04-24 ENCOUNTER — LAB VISIT (OUTPATIENT)
Dept: LAB | Facility: HOSPITAL | Age: 65
End: 2025-04-24
Attending: ORTHOPAEDIC SURGERY
Payer: MEDICARE

## 2025-04-24 ENCOUNTER — HOSPITAL ENCOUNTER (OUTPATIENT)
Dept: RADIOLOGY | Facility: CLINIC | Age: 65
Discharge: HOME | End: 2025-04-24
Attending: ORTHOPAEDIC SURGERY
Payer: MEDICARE

## 2025-04-24 VITALS
HEIGHT: 66 IN | WEIGHT: 231 LBS | DIASTOLIC BLOOD PRESSURE: 73 MMHG | HEART RATE: 74 BPM | BODY MASS INDEX: 37.12 KG/M2 | SYSTOLIC BLOOD PRESSURE: 125 MMHG

## 2025-04-24 DIAGNOSIS — M17.12 PRIMARY OSTEOARTHRITIS OF LEFT KNEE: ICD-10-CM

## 2025-04-24 DIAGNOSIS — S82.042K CLOSED DISPLACED COMMINUTED FRACTURE OF LEFT PATELLA WITH NONUNION: ICD-10-CM

## 2025-04-24 DIAGNOSIS — S82.042K CLOSED DISPLACED COMMINUTED FRACTURE OF LEFT PATELLA WITH NONUNION: Primary | ICD-10-CM

## 2025-04-24 LAB
BASOPHILS # BLD AUTO: 0.03 X10(3)/MCL
BASOPHILS NFR BLD AUTO: 0.4 %
CRP SERPL HS-MCNC: 7.78 MG/L
EOSINOPHIL # BLD AUTO: 0.29 X10(3)/MCL (ref 0–0.9)
EOSINOPHIL NFR BLD AUTO: 3.9 %
ERYTHROCYTE [DISTWIDTH] IN BLOOD BY AUTOMATED COUNT: 13.2 % (ref 11.5–17)
ERYTHROCYTE [SEDIMENTATION RATE] IN BLOOD: 14 MM/HR (ref 0–20)
HCT VFR BLD AUTO: 39.7 % (ref 37–47)
HGB BLD-MCNC: 13 G/DL (ref 12–16)
IMM GRANULOCYTES # BLD AUTO: 0.01 X10(3)/MCL (ref 0–0.04)
IMM GRANULOCYTES NFR BLD AUTO: 0.1 %
LYMPHOCYTES # BLD AUTO: 2.3 X10(3)/MCL (ref 0.6–4.6)
LYMPHOCYTES NFR BLD AUTO: 30.9 %
MCH RBC QN AUTO: 28.6 PG (ref 27–31)
MCHC RBC AUTO-ENTMCNC: 32.7 G/DL (ref 33–36)
MCV RBC AUTO: 87.3 FL (ref 80–94)
MONOCYTES # BLD AUTO: 0.75 X10(3)/MCL (ref 0.1–1.3)
MONOCYTES NFR BLD AUTO: 10.1 %
NEUTROPHILS # BLD AUTO: 4.07 X10(3)/MCL (ref 2.1–9.2)
NEUTROPHILS NFR BLD AUTO: 54.6 %
NRBC BLD AUTO-RTO: 0 %
PLATELET # BLD AUTO: 217 X10(3)/MCL (ref 130–400)
PMV BLD AUTO: 10.7 FL (ref 7.4–10.4)
RBC # BLD AUTO: 4.55 X10(6)/MCL (ref 4.2–5.4)
WBC # BLD AUTO: 7.45 X10(3)/MCL (ref 4.5–11.5)

## 2025-04-24 PROCEDURE — 36415 COLL VENOUS BLD VENIPUNCTURE: CPT

## 2025-04-24 PROCEDURE — 85652 RBC SED RATE AUTOMATED: CPT

## 2025-04-24 PROCEDURE — 85025 COMPLETE CBC W/AUTO DIFF WBC: CPT

## 2025-04-24 PROCEDURE — 86141 C-REACTIVE PROTEIN HS: CPT

## 2025-04-24 PROCEDURE — 73564 X-RAY EXAM KNEE 4 OR MORE: CPT | Mod: LT,,, | Performed by: ORTHOPAEDIC SURGERY

## 2025-04-25 ENCOUNTER — PATIENT MESSAGE (OUTPATIENT)
Dept: ORTHOPEDICS | Facility: CLINIC | Age: 65
End: 2025-04-25
Payer: MEDICARE

## 2025-04-25 NOTE — PROGRESS NOTES
Chief Complaint:   Chief Complaint   Patient presents with    Left Knee - Pain, Follow-up     Pt was referred by Dr. Kim to discuss a TKA for the left knee. Experiencing severe constant pain. Can't WB. Ambulating with assistance.        History of present illness:    History of Present Illness  The patient presents for evaluation of left knee pain.    Persistent pain in the left knee has been ongoing for the past 2 years. The pain is described as a sensation of stiffness upon bending the knee. Previous interventions included multiple surgeries by Dr. Yeh, involving the removal and redoing of screws and plates four times, and eventually using an external fixator to facilitate healing. Despite these efforts, the knee continues to be painful, with the entire knee hurting at times. She reports that the kneecap is essentially nonexistent, with only a small piece remaining.    Approximately 1.5 years ago, injections were administered in both knees by a rheumatologist, which did not provide any relief. Additionally, she has received knee injections from a spine and pain specialist, which also failed to alleviate the pain.    The patient also reports having bone spurs and plantar fasciitis in her foot, which exacerbates her knee pain due to compensatory mechanisms.    PAST SURGICAL HISTORY:  Two laparoscopic procedures, knee replacement, hip replacement.    Past Medical History:   Diagnosis Date    Acute deep vein thrombosis (DVT) of left lower extremity, unspecified vein 10/12/2023    pt stated not completely resolved however pcp stopped eliquis 1mo ago and dr kim will restart postop per pt    Allergies     Amenorrhea     Anemia     Anxiety     Arthritis     Closed displaced comminuted fracture of left patella, initial encounter     Closed displaced comminuted fracture of left patella, sequela     Depression     Digestive disorder     acid reflux    Endometriosis of uterus 1985    Dont remember date    Fibroid      1980    Fibromyalgia     Heart murmur     HLD (hyperlipidemia)     Hypertension     15 plus yrs    Insomnia     Menopause Now    Since age 29    Migraine     Obesity     Pain in left leg     Painful orthopaedic hardware     Plantar fasciitis, right     Pregnancy 10/26/1981    Live birth    SOB (shortness of breath)     STD (sexually transmitted disease)     1987    Stroke 2005    Surgical wound dehiscence, initial encounter 11/23/2023    Systemic lupus erythematosus, organ or system involvement unspecified     Thyroid disease     No thyroid       Past Surgical History:   Procedure Laterality Date    CATARACT EXTRACTION      CHOLECYSTECTOMY      COLONOSCOPY      ESOPHAGOGASTRODUODENOSCOPY      HYSTERECTOMY      MEHRAN BSO    INCISION AND DRAINAGE, LOWER EXTREMITY Left 11/23/2023    Procedure: INCISION AND DRAINAGE, LOWER EXTREMITY;  Surgeon: Curtis Corona MD;  Location: [facility] OR;  Service: Orthopedics;  Laterality: Left;  I & D left knee for dehiscence/vascular table/bone foam/3L saline with cysto tubing/Vancomycin/Xperience irrigation/13cm Prevena    LUMBAR SPINE SURGERY      MANIPULATION WITH ANESTHESIA Left 05/29/2024    Procedure: MANIPULATION, WITH ANESTHESIA;  Surgeon: Curtis Corona MD;  Location: [facility] OR;  Service: Orthopedics;  Laterality: Left;    OOPHORECTOMY      OPEN REDUCTION AND INTERNAL FIXATION (ORIF) OF FRACTURE OF PATELLA Left 10/27/2023    Procedure: ORIF, FRACTURE, PATELLA, LEFT;  Surgeon: Curtis Corona MD;  Location: [facility] OR;  Service: Orthopedics;  Laterality: Left;  Supine, vascular bed, bone foam, tourniquet  1st case  Synthes small CCHS, 18g wire, locking patella plates    OPEN REDUCTION AND INTERNAL FIXATION (ORIF) OF FRACTURE OF PATELLA Left 04/09/2024    Procedure: REVISION ORIF, FRACTURE, PATELLA;  Surgeon: Curtis Corona MD;  Location: [facility] OR;  Service: Orthopedics;  Laterality: Left;    OPEN REDUCTION AND INTERNAL FIXATION (ORIF) OF FRACTURE OF PATELLA Left 04/26/2024     Procedure: ORIF, FRACTURE, PATELLA WITH EX_FIX APPLICATION;  Surgeon: Curtis Corona MD;  Location: [facility] OR;  Service: Orthopedics;  Laterality: Left;  Supine, vascular bed, bone foam, tourniquet  Removal of CCHS screws and application synthes ex fix  #5 fiberwire x 3, hewson suture passer and drill    REMOVAL OF EXTERNAL FIXATION DEVICE Left 05/29/2024    Procedure: REMOVAL, EXTERNAL FIXATION DEVICE;  Surgeon: Curtis Corona MD;  Location: [facility] OR;  Service: Orthopedics;  Laterality: Left;  1st case  vascular bed  no sterile setup needed  currette and betadine for pin sites  synthes ex fix removal  pt bringing hinge brace    ROTATOR CUFF REPAIR Bilateral     total of 4 surgeries    THYROIDECTOMY      TOTAL HIP ARTHROPLASTY Right     TOTAL KNEE ARTHROPLASTY Right     c       Current Outpatient Medications   Medication Sig    albuterol (PROVENTIL) 2.5 mg /3 mL (0.083 %) nebulizer solution Take 2.5 mg by nebulization 4 (four) times daily as needed for Shortness of Breath or Wheezing.    ALPRAZolam (XANAX) 1 MG tablet Take 1 tablet (1 mg total) by mouth every evening.    clindamycin (CLEOCIN T) 1 % external solution Apply topically 2 (two) times daily.    EPINEPHrine (EPIPEN) 0.3 mg/0.3 mL AtIn Inject 1 each into the muscle once.    EScitalopram oxalate (LEXAPRO) 20 MG tablet Take 20 mg by mouth.    hydrOXYzine (ATARAX) 50 MG tablet Take 50 mg by mouth 3 (three) times daily as needed.    levothyroxine (SYNTHROID) 100 MCG tablet Take 100 mcg by mouth once daily.    liothyronine (CYTOMEL) 5 MCG Tab Take 5 mcg by mouth every morning.    losartan-hydrochlorothiazide 100-12.5 mg (HYZAAR) 100-12.5 mg Tab Take 1 tablet by mouth once daily.    oxybutynin (DITROPAN-XL) 5 MG TR24 Take 1 tablet (5 mg total) by mouth once daily.    oxyCODONE-acetaminophen (PERCOCET)  mg per tablet Take 1 tablet by mouth every 12 (twelve) hours as needed.    promethazine (PHENERGAN) 25 MG tablet Take 25 mg by mouth every 6 (six) hours  as needed for Nausea.    rosuvastatin (CRESTOR) 40 MG Tab Take 1 tablet (40 mg total) by mouth once daily.    sumatriptan (IMITREX) 25 MG Tab Take 1 tablet by mouth daily as needed for Migraine.    ELIQUIS 5 mg Tab Take 5 mg by mouth 2 (two) times daily. (Patient not taking: Reported on 4/1/2025)    estradioL (ESTRACE) 1 MG tablet Take 1 mg by mouth once daily.     No current facility-administered medications for this visit.       Review of patient's allergies indicates:   Allergen Reactions    Amlodipine Swelling     Per patient     Azithromycin     Levofloxacin     Lisinopril Edema       Family History   Problem Relation Name Age of Onset    Diabetes Mother Blanche bergman     Cancer Mother Blanche bergman         Lung cancer    Heart attack Mother Blanche bergman     Heart attack Father Alberto Hernandez     Diabetes Father Alberto Hernandez     Cancer Father Alberto Hernandez         Lung cancer    Rheum arthritis Sister Kelsey hernandez         Lupus    Osteoarthritis Sister Kelsey hernandez     Hypertension Sister Kelsey hernandez     Rashes / Skin problems Sister Kelsey hernandez     Diabetes Sister Rosita Kramer         Diabetes    Hypertension Sister Rosita Kramer     Stroke Sister Rosita Kramer     Migraines Sister Valery keyshawn         Migraines    Diabetes Brother Ariel kramer         Diabetes    Heart failure Brother Ariel kramer         Heart problems       Social History[1]        Review of Systems:    Constitution: Negative for chills, fever, and sweats.  Negative for unexplained weight loss.    HENT:  Negative for headaches and blurry vision.    Cardiovascular:Negative for chest pain or irregular heart beat. Negative for hypertension.    Respiratory:  Negative for cough and shortness of breath.    Gastrointestinal: Negative for abdominal pain, heartburn, melena, nausea, and vomitting.    Genitourinary:  Negative bladder incontinence and dysuria.    Musculoskeletal:  See HPI    Neurological:  Negative for numbness.    Psychiatric/Behavioral: Negative for depression.  The patient is not nervous/anxious.      Endocrine: Negative for polyuria    Hematologic/Lymphatic: Negative for bleeding problem.  Does not bruise/bleed easily.    Skin: Negative for poor would healing and rash      Physical Examination:    Vital Signs:    Vitals:    04/24/25 1045   BP: 125/73   Pulse: 74       Body mass index is 37.28 kg/m².    General: No acute distress, alert and oriented, healthy appearing    HEENT: Head is atraumatic, mucous membranes are moist    Neck: Supples, no JVD    Cardiovascular: Palpable dorsalis pedis and posterior tibial pulses, regular rate and rhythm to those pulses    Lungs: Breathing non-labored    Skin: no rashes appreciated    Neurologic: Can flex and extend knees, ankles, and toes. Sensation is grossly intact    Left knee:  Brisk capillary refill distally.  Sensation intact distally range of motion of the left knee with significant crepitus and pain.  Patient has a well-healed midline incision from multiple patellas surgeries.  She can extend her knee extensor mechanism that has is intact.    X-rays:  Three views of the left knee reviewed.  Patient with significant patellofemoral osteoarthritis as well as osteoarthritis of the remaining knee.  Her patella it would be fairly diminutive with the extremely small     Assessment::  Left knee osteoarthritis following multiple patella reconstructions    Plan:  Discussed all treatment options the patient today.  Given her multiple surgeries, work her up for infection with blood work and possibly an aspiration.  She is interested in a total knee arthroplasty.  We had we would not be going to resurfaced patella given the fact that has really not a whole lot left.  Otherwise we will plan to see her back after her blood work has been obtained.    This note was generated with the assistance of ambient listening technology. Verbal consent was obtained by the  patient and accompanying visitor(s) for the recording of patient appointment to facilitate this note. I attest to having reviewed and edited the generated note for accuracy, though some syntax or spelling errors may persist. Please contact the author of this note for any clarification.      This note was created using Micro[employer] voice recognition software that occasionally misinterpreted phrases or words.    Consult note is delivered via Epic messaging service.         [1]   Social History  Socioeconomic History    Marital status:    Tobacco Use    Smoking status: Never    Smokeless tobacco: Never    Tobacco comments:     Never smoked   Substance and Sexual Activity    Alcohol use: Never    Drug use: Not Currently    Sexual activity: Not Currently     Partners: Male     Birth control/protection: None     Comment: Not currently having sex   Social History Narrative    ** Merged History Encounter **          Social Drivers of Health     Financial Resource Strain: Low Risk  (2/20/2025)    Overall Financial Resource Strain (CARDIA)     Difficulty of Paying Living Expenses: Not very hard   Recent Concern: Financial Resource Strain - Medium Risk (2/6/2025)    Overall Financial Resource Strain (CARDIA)     Difficulty of Paying Living Expenses: Somewhat hard   Food Insecurity: Food Insecurity Present (2/20/2025)    Hunger Vital Sign     Worried About Running Out of Food in the Last Year: Sometimes true     Ran Out of Food in the Last Year: Sometimes true   Transportation Needs: No Transportation Needs (2/20/2025)    PRAPARE - Transportation     Lack of Transportation (Medical): No     Lack of Transportation (Non-Medical): No   Physical Activity: Inactive (2/20/2025)    Exercise Vital Sign     Days of Exercise per Week: 0 days     Minutes of Exercise per Session: 0 min   Stress: No Stress Concern Present (2/20/2025)    [employer] - Occupational Stress Questionnaire     Feeling of Stress :  Only a little   Recent Concern: Stress - Stress Concern Present (2/6/2025)    [employer] [place] - Occupational Stress Questionnaire     Feeling of Stress : To some extent   Housing Stability: High Risk (2/20/2025)    Housing Stability Vital Sign     Unable to Pay for Housing in the Last Year: Yes     Number of Times Moved in the Last Year: 0     Homeless in the Last Year: No

## 2025-04-29 ENCOUNTER — PATIENT MESSAGE (OUTPATIENT)
Dept: FAMILY MEDICINE | Facility: CLINIC | Age: 65
End: 2025-04-29
Payer: MEDICARE

## 2025-05-01 ENCOUNTER — TELEPHONE (OUTPATIENT)
Dept: FAMILY MEDICINE | Facility: CLINIC | Age: 65
End: 2025-05-01
Payer: MEDICARE

## 2025-05-01 ENCOUNTER — PATIENT MESSAGE (OUTPATIENT)
Dept: FAMILY MEDICINE | Facility: CLINIC | Age: 65
End: 2025-05-01
Payer: MEDICARE

## 2025-05-01 DIAGNOSIS — F41.9 ANXIETY: ICD-10-CM

## 2025-05-01 RX ORDER — ALPRAZOLAM 1 MG/1
1 TABLET ORAL NIGHTLY
Qty: 30 TABLET | Refills: 0 | Status: SHIPPED | OUTPATIENT
Start: 2025-05-01

## 2025-05-02 DIAGNOSIS — R79.9 ABNORMAL FINDING OF BLOOD CHEMISTRY, UNSPECIFIED: ICD-10-CM

## 2025-05-02 DIAGNOSIS — E66.01 SEVERE OBESITY (BMI 35.0-39.9) WITH COMORBIDITY: ICD-10-CM

## 2025-05-02 DIAGNOSIS — E78.5 HYPERLIPIDEMIA, UNSPECIFIED HYPERLIPIDEMIA TYPE: Primary | ICD-10-CM

## 2025-05-02 DIAGNOSIS — Z11.59 NEED FOR HEPATITIS C SCREENING TEST: ICD-10-CM

## 2025-05-02 DIAGNOSIS — E11.9 TYPE 2 DIABETES MELLITUS WITHOUT COMPLICATION, WITHOUT LONG-TERM CURRENT USE OF INSULIN: ICD-10-CM

## 2025-05-02 DIAGNOSIS — Z11.4 ENCOUNTER FOR SCREENING FOR HIV: ICD-10-CM

## 2025-05-08 ENCOUNTER — LAB VISIT (OUTPATIENT)
Dept: LAB | Facility: HOSPITAL | Age: 65
End: 2025-05-08
Attending: NURSE PRACTITIONER
Payer: MEDICARE

## 2025-05-08 DIAGNOSIS — E11.9 TYPE 2 DIABETES MELLITUS WITHOUT COMPLICATION, WITHOUT LONG-TERM CURRENT USE OF INSULIN: ICD-10-CM

## 2025-05-08 DIAGNOSIS — R79.9 ABNORMAL FINDING OF BLOOD CHEMISTRY, UNSPECIFIED: ICD-10-CM

## 2025-05-08 DIAGNOSIS — Z11.59 NEED FOR HEPATITIS C SCREENING TEST: ICD-10-CM

## 2025-05-08 DIAGNOSIS — Z11.4 ENCOUNTER FOR SCREENING FOR HIV: ICD-10-CM

## 2025-05-08 DIAGNOSIS — E78.5 HYPERLIPIDEMIA, UNSPECIFIED HYPERLIPIDEMIA TYPE: ICD-10-CM

## 2025-05-08 DIAGNOSIS — E66.01 SEVERE OBESITY (BMI 35.0-39.9) WITH COMORBIDITY: ICD-10-CM

## 2025-05-08 LAB
ALBUMIN SERPL-MCNC: 4.7 G/DL (ref 3.4–5)
ALBUMIN/GLOB SERPL: 1.6 RATIO
ALP SERPL-CCNC: 105 UNIT/L (ref 50–144)
ALT SERPL-CCNC: 27 UNIT/L (ref 1–45)
ANION GAP SERPL CALC-SCNC: 4 MEQ/L (ref 2–13)
AST SERPL-CCNC: 31 UNIT/L (ref 14–36)
BASOPHILS # BLD AUTO: 0.02 X10(3)/MCL (ref 0.01–0.08)
BASOPHILS NFR BLD AUTO: 0.2 % (ref 0.1–1.2)
BILIRUB SERPL-MCNC: 0.5 MG/DL (ref 0–1)
BUN SERPL-MCNC: 15 MG/DL (ref 7–20)
CALCIUM SERPL-MCNC: 10.1 MG/DL (ref 8.4–10.2)
CHLORIDE SERPL-SCNC: 106 MMOL/L (ref 98–110)
CHOLEST SERPL-MCNC: 169 MG/DL (ref 0–200)
CO2 SERPL-SCNC: 31 MMOL/L (ref 21–32)
CREAT SERPL-MCNC: 1 MG/DL (ref 0.66–1.25)
CREAT/UREA NIT SERPL: 15 (ref 12–20)
EOSINOPHIL # BLD AUTO: 0.23 X10(3)/MCL (ref 0.04–0.36)
EOSINOPHIL NFR BLD AUTO: 2.8 % (ref 0.7–7)
ERYTHROCYTE [DISTWIDTH] IN BLOOD BY AUTOMATED COUNT: 13.1 % (ref 11–14.5)
EST. AVERAGE GLUCOSE BLD GHB EST-MCNC: 134.1 MG/DL (ref 70–115)
GFR SERPLBLD CREATININE-BSD FMLA CKD-EPI: 63 ML/MIN/1.73/M2
GLOBULIN SER-MCNC: 3 GM/DL (ref 2–3.9)
GLUCOSE SERPL-MCNC: 108 MG/DL (ref 70–115)
HBA1C MFR BLD: 6.3 % (ref 4–6)
HCT VFR BLD AUTO: 39.7 % (ref 36–48)
HDLC SERPL-MCNC: 56 MG/DL (ref 40–60)
HGB BLD-MCNC: 13.1 G/DL (ref 11.8–16)
IMM GRANULOCYTES # BLD AUTO: 0.02 X10(3)/MCL (ref 0–0.03)
IMM GRANULOCYTES NFR BLD AUTO: 0.2 % (ref 0–0.5)
LDLC SERPL DIRECT ASSAY-SCNC: 69.8 MG/DL (ref 30–100)
LYMPHOCYTES # BLD AUTO: 1.57 X10(3)/MCL (ref 1.16–3.74)
LYMPHOCYTES NFR BLD AUTO: 19.3 % (ref 20–55)
MCH RBC QN AUTO: 28.3 PG (ref 27–34)
MCHC RBC AUTO-ENTMCNC: 33 G/DL (ref 31–37)
MCV RBC AUTO: 85.7 FL (ref 79–99)
MONOCYTES # BLD AUTO: 0.47 X10(3)/MCL (ref 0.24–0.36)
MONOCYTES NFR BLD AUTO: 5.8 % (ref 4.7–12.5)
NEUTROPHILS # BLD AUTO: 5.82 X10(3)/MCL (ref 1.56–6.13)
NEUTROPHILS NFR BLD AUTO: 71.7 % (ref 37–73)
NRBC BLD AUTO-RTO: 0 %
PLATELET # BLD AUTO: 240 X10(3)/MCL (ref 140–371)
PMV BLD AUTO: 11.2 FL (ref 9.4–12.4)
POTASSIUM SERPL-SCNC: 4 MMOL/L (ref 3.5–5.1)
PROT SERPL-MCNC: 7.7 GM/DL (ref 6.3–8.2)
RBC # BLD AUTO: 4.63 X10(6)/MCL (ref 4–5.1)
SODIUM SERPL-SCNC: 141 MMOL/L (ref 136–145)
TRIGL SERPL-MCNC: 184 MG/DL (ref 30–200)
WBC # BLD AUTO: 8.13 X10(3)/MCL (ref 4–11.5)

## 2025-05-08 PROCEDURE — 86803 HEPATITIS C AB TEST: CPT

## 2025-05-08 PROCEDURE — 82306 VITAMIN D 25 HYDROXY: CPT

## 2025-05-08 PROCEDURE — 83036 HEMOGLOBIN GLYCOSYLATED A1C: CPT

## 2025-05-08 PROCEDURE — 36415 COLL VENOUS BLD VENIPUNCTURE: CPT

## 2025-05-08 PROCEDURE — 80053 COMPREHEN METABOLIC PANEL: CPT

## 2025-05-08 PROCEDURE — 87389 HIV-1 AG W/HIV-1&-2 AB AG IA: CPT

## 2025-05-08 PROCEDURE — 82043 UR ALBUMIN QUANTITATIVE: CPT

## 2025-05-08 PROCEDURE — 80061 LIPID PANEL: CPT

## 2025-05-08 PROCEDURE — 85025 COMPLETE CBC W/AUTO DIFF WBC: CPT

## 2025-05-09 LAB
25(OH)D3+25(OH)D2 SERPL-MCNC: 31 NG/ML (ref 30–80)
CREAT UR-MCNC: 210.3 MG/DL (ref 45–106)
HCV AB SERPL QL IA: NONREACTIVE
HIV 1+2 AB+HIV1 P24 AG SERPL QL IA: NONREACTIVE
MICROALBUMIN UR-MCNC: 26.4 UG/ML
MICROALBUMIN/CREAT RATIO PNL UR: 12.6 MG/GM CR (ref 0–30)

## 2025-05-12 ENCOUNTER — OFFICE VISIT (OUTPATIENT)
Dept: FAMILY MEDICINE | Facility: CLINIC | Age: 65
End: 2025-05-12
Payer: MEDICARE

## 2025-05-12 DIAGNOSIS — R30.0 DYSURIA: ICD-10-CM

## 2025-05-12 DIAGNOSIS — R80.9 ALBUMINURIA: ICD-10-CM

## 2025-05-12 DIAGNOSIS — G43.709 CHRONIC MIGRAINE WITHOUT AURA WITHOUT STATUS MIGRAINOSUS, NOT INTRACTABLE: ICD-10-CM

## 2025-05-12 DIAGNOSIS — E11.9 TYPE 2 DIABETES MELLITUS WITHOUT COMPLICATION, WITHOUT LONG-TERM CURRENT USE OF INSULIN: Primary | ICD-10-CM

## 2025-05-12 DIAGNOSIS — M41.9 SCOLIOSIS OF LUMBAR SPINE, UNSPECIFIED SCOLIOSIS TYPE: ICD-10-CM

## 2025-05-12 DIAGNOSIS — E66.01 SEVERE OBESITY (BMI 35.0-39.9) WITH COMORBIDITY: ICD-10-CM

## 2025-05-12 PROCEDURE — 98007 SYNCH AUDIO-VIDEO EST HI 40: CPT | Mod: 95,,, | Performed by: NURSE PRACTITIONER

## 2025-05-12 RX ORDER — INSULIN PUMP SYRINGE, 3 ML
EACH MISCELLANEOUS
Qty: 1 EACH | Refills: 0 | Status: SHIPPED | OUTPATIENT
Start: 2025-05-12

## 2025-05-12 RX ORDER — LANCETS
EACH MISCELLANEOUS
Qty: 120 EACH | Refills: 11 | Status: SHIPPED | OUTPATIENT
Start: 2025-05-12

## 2025-05-12 RX ORDER — SUMATRIPTAN SUCCINATE 100 MG/1
100 TABLET ORAL DAILY PRN
Qty: 8 TABLET | Refills: 3 | Status: SHIPPED | OUTPATIENT
Start: 2025-05-12

## 2025-05-12 RX ORDER — CEFDINIR 300 MG/1
300 CAPSULE ORAL 2 TIMES DAILY
Qty: 20 CAPSULE | Refills: 0 | Status: SHIPPED | OUTPATIENT
Start: 2025-05-12 | End: 2025-05-12

## 2025-05-12 RX ORDER — CEFDINIR 300 MG/1
300 CAPSULE ORAL 2 TIMES DAILY
Qty: 14 CAPSULE | Refills: 0 | Status: SHIPPED | OUTPATIENT
Start: 2025-05-12 | End: 2025-05-19

## 2025-05-12 RX ORDER — FLUCONAZOLE 150 MG/1
150 TABLET ORAL DAILY
Qty: 1 TABLET | Refills: 0 | Status: SHIPPED | OUTPATIENT
Start: 2025-05-12 | End: 2025-05-13

## 2025-05-12 NOTE — ASSESSMENT & PLAN NOTE
Lab Results   Component Value Date    HGBA1C 6.3 (H) 05/08/2025    HGBA1C 6.4 (H) 02/14/2025    CREATININE 1.00 05/08/2025      Diabetes Medications              empagliflozin (JARDIANCE) 10 mg tablet Take 1 tablet (10 mg total) by mouth once daily.          On ACE and Statin according to guidelines.  Discussed caution with SGLT2s + diuretics as concomitant use can cause volume depletion. Discussed caution with DPP-Ivs and HF risk.  Follow ADA Diet. Avoid soda, simple sweets, and limit rice/pasta/breads/starches (no more than 45-50 grams per meal).  Maintain healthy weight with goal BMI <30.  Exercise 5 times per week for 30 minutes per day.  Stressed importance of daily foot exams.  Stressed importance of annual dilated eye exam.

## 2025-05-12 NOTE — PROGRESS NOTES
Family Medicine      Patient ID: [id number]     Chief Complaint: Results      HPI:     This is a telemedicine note. Patient was treated using telemedicine, real time audio and video, according to [facility] protocols. IOanh, conducted the visit from the [facility]. The patient participated in the visit at a non-[facility] location selected by the patient, identified below. I am licensed in the state where the patient stated they are located. The patient stated that they understood and accepted the privacy and security risks to their information at their location. This visit is not recorded.    Patient was located at the patient's home.        Briseyda Torres is a 65 y.o. female here today for a telemedicine visit.     Past Medical History:   Diagnosis Date    Acute deep vein thrombosis (DVT) of left lower extremity, unspecified vein 10/12/2023    pt stated not completely resolved however pcp stopped eliquis 1mo ago and dr kim will restart postop per pt    Allergies     Amenorrhea     Anemia     Anxiety     Arthritis     Closed displaced comminuted fracture of left patella, initial encounter     Closed displaced comminuted fracture of left patella, sequela     Depression     Digestive disorder     acid reflux    Endometriosis of uterus 1985    Dont remember date    Fibroid     1980    Fibromyalgia     Heart murmur     HLD (hyperlipidemia)     Hypertension     15 plus yrs    Insomnia     Menopause Now    Since age 29    Migraine     Obesity     Pain in left leg     Painful orthopaedic hardware     Plantar fasciitis, right     Pregnancy 10/26/1981    Live birth    SOB (shortness of breath)     STD (sexually transmitted disease)     1987    Stroke 2005    Surgical wound dehiscence, initial encounter 11/23/2023    Systemic lupus erythematosus, organ or system involvement unspecified     Thyroid disease     No thyroid        Past Surgical History:   Procedure Laterality Date    CATARACT  EXTRACTION      CHOLECYSTECTOMY      COLONOSCOPY      ESOPHAGOGASTRODUODENOSCOPY      HYSTERECTOMY      MEHRAN BSO    INCISION AND DRAINAGE, LOWER EXTREMITY Left 11/23/2023    Procedure: INCISION AND DRAINAGE, LOWER EXTREMITY;  Surgeon: Curtis Corona MD;  Location: [facility] OR;  Service: Orthopedics;  Laterality: Left;  I & D left knee for dehiscence/vascular table/bone foam/3L saline with cysto tubing/Vancomycin/Xperience irrigation/13cm Prevena    LUMBAR SPINE SURGERY      MANIPULATION WITH ANESTHESIA Left 05/29/2024    Procedure: MANIPULATION, WITH ANESTHESIA;  Surgeon: Curtis Corona MD;  Location: [facility] OR;  Service: Orthopedics;  Laterality: Left;    OOPHORECTOMY      OPEN REDUCTION AND INTERNAL FIXATION (ORIF) OF FRACTURE OF PATELLA Left 10/27/2023    Procedure: ORIF, FRACTURE, PATELLA, LEFT;  Surgeon: Curtis Corona MD;  Location: [facility] OR;  Service: Orthopedics;  Laterality: Left;  Supine, vascular bed, bone foam, tourniquet  1st case  Synthes small CCHS, 18g wire, locking patella plates    OPEN REDUCTION AND INTERNAL FIXATION (ORIF) OF FRACTURE OF PATELLA Left 04/09/2024    Procedure: REVISION ORIF, FRACTURE, PATELLA;  Surgeon: Curtis Corona MD;  Location: [facility] OR;  Service: Orthopedics;  Laterality: Left;    OPEN REDUCTION AND INTERNAL FIXATION (ORIF) OF FRACTURE OF PATELLA Left 04/26/2024    Procedure: ORIF, FRACTURE, PATELLA WITH EX_FIX APPLICATION;  Surgeon: Curtis Corona MD;  Location: [facility] OR;  Service: Orthopedics;  Laterality: Left;  Supine, vascular bed, bone foam, tourniquet  Removal of CCHS screws and application synthes ex fix  #5 fiberwire x 3, hewson suture passer and drill    REMOVAL OF EXTERNAL FIXATION DEVICE Left 05/29/2024    Procedure: REMOVAL, EXTERNAL FIXATION DEVICE;  Surgeon: Curtis Corona MD;  Location: [facility] OR;  Service: Orthopedics;  Laterality: Left;  1st case  vascular bed  no sterile setup needed  currette and betadine for pin sites  synthes ex fix removal  pt  bringing hinge brace    ROTATOR CUFF REPAIR Bilateral     total of 4 surgeries    THYROIDECTOMY      TOTAL HIP ARTHROPLASTY Right     TOTAL KNEE ARTHROPLASTY Right     c        Social History     Tobacco Use    Smoking status: Never    Smokeless tobacco: Never    Tobacco comments:     Never smoked   Substance and Sexual Activity    Alcohol use: Never    Drug use: Not Currently    Sexual activity: Not Currently     Partners: Male     Birth control/protection: None     Comment: Not currently having sex        Current Outpatient Medications   Medication Instructions    albuterol (PROVENTIL) 2.5 mg, 4 times daily PRN    ALPRAZolam (XANAX) 1 mg, Oral, Nightly    blood sugar diagnostic Strp To check BG daily, to use with insurance preferred meter    blood-glucose meter kit To check BG daily, to use with insurance preferred meter    clindamycin (CLEOCIN T) 1 % external solution Topical (Top), 2 times daily    empagliflozin (JARDIANCE) 10 mg, Oral, Daily    EPINEPHrine (EPIPEN) 0.3 mg/0.3 mL AtIn 1 each, Once    EScitalopram oxalate (LEXAPRO) 20 mg    fluconazole (DIFLUCAN) 150 mg, Oral, Daily    hydrOXYzine (ATARAX) 50 mg, 3 times daily PRN    lancets Misc To check BG daily, to use with insurance preferred meter    levothyroxine (SYNTHROID) 100 mcg, Daily    liothyronine (CYTOMEL) 5 mcg, Every morning    losartan-hydrochlorothiazide 100-12.5 mg (HYZAAR) 100-12.5 mg Tab 1 tablet, Daily    oxybutynin (DITROPAN-XL) 5 mg, Oral, Daily    oxyCODONE-acetaminophen (PERCOCET)  mg per tablet 1 tablet, Every 12 hours PRN    promethazine (PHENERGAN) 25 mg, Every 6 hours PRN    rosuvastatin (CRESTOR) 40 mg, Oral, Daily    sumatriptan (IMITREX) 100 mg, Oral, Daily PRN       Review of patient's allergies indicates:   Allergen Reactions    Amlodipine Swelling     Per patient     Azithromycin     Levofloxacin     Lisinopril Edema        Patient Care Team:  Oanh Chan FNP as PCP - General (Family Medicine)  Curtis Corona MD as  Consulting Physician (Orthopedic Surgery)  Olinda Wong MD as Consulting Physician (Breast Surgery)  [facility] as Chronic  (Pain Medicine)     Subjective:     Review of Systems   Constitutional:  Negative for activity change and unexpected weight change.   HENT:  Negative for hearing loss, rhinorrhea and trouble swallowing.    Eyes:  Positive for visual disturbance. Negative for discharge.   Respiratory:  Negative for chest tightness and wheezing.    Cardiovascular:  Negative for chest pain and palpitations.   Gastrointestinal:  Positive for diarrhea. Negative for blood in stool, constipation and vomiting.   Endocrine: Negative for polydipsia and polyuria.   Genitourinary:  Negative for difficulty urinating, dysuria, hematuria and menstrual problem.   Musculoskeletal:  Positive for arthralgias. Negative for joint swelling and neck pain.   Neurological:  Positive for weakness and headaches.   Psychiatric/Behavioral:  Negative for confusion and dysphoric mood.        12 point review of systems conducted, negative except as stated in the history of present illness. See HPI for details.    Objective:     There were no vitals taken for this visit.    Physical Exam  Vitals and nursing note reviewed.   Constitutional:       General: She is not in acute distress.     Appearance: Normal appearance. She is not ill-appearing or diaphoretic.   HENT:      Head: Normocephalic.   Eyes:      General: No scleral icterus.     Extraocular Movements: Extraocular movements intact.      Conjunctiva/sclera: Conjunctivae normal.   Neurological:      General: No focal deficit present.      Mental Status: She is alert and oriented to person, place, and time. Mental status is at baseline.   Psychiatric:         Mood and Affect: Mood normal.         Behavior: Behavior normal.         Thought Content: Thought content normal.         Judgment: Judgment normal.         Physical Exam: LIMITED DUE TO TELEMEDICINE  RESTRICTIONS.  General: Alert and oriented, No acute distress.  Head: Normocephalic, Atraumatic.  Eye: Sclera non-icteric.  Neck/Thyroid:  Full range of motion.  Respiratory: Non-labored respirations, Symmetrical chest wall expansion.  Musculoskeletal: Normal range of motion.  Integumentary:  No visible suspicious lesions or rashes. No diaphoresis.   Neurologic: No focal deficits  Psychiatric: Normal interaction, Coherent speech, Euthymic mood, Appropriate affect     Assessment:       ICD-10-CM ICD-9-CM   1. Type 2 diabetes mellitus without complication, without long-term current use of insulin  E11.9 250.00   2. Severe obesity (BMI 35.0-39.9) with comorbidity  E66.01 278.01   3. Body mass index (BMI) 37.0-37.9, adult  Z68.37 V85.37   4. Dysuria  R30.0 788.1   5. Albuminuria  R80.9 791.0   6. Chronic migraine without aura without status migrainosus, not intractable  G43.709 346.70        Plan:     1. Type 2 diabetes mellitus without complication, without long-term current use of insulin  Overview:  A1c 6.5, Advised starting metformin, 500mg BID.    Orders:  -     empagliflozin (JARDIANCE) 10 mg tablet; Take 1 tablet (10 mg total) by mouth once daily.  Dispense: 90 tablet; Refill: 1  -     blood-glucose meter kit; To check BG daily, to use with insurance preferred meter  Dispense: 1 each; Refill: 0  -     lancets Misc; To check BG daily, to use with insurance preferred meter  Dispense: 120 each; Refill: 11  -     blood sugar diagnostic Strp; To check BG daily, to use with insurance preferred meter  Dispense: 120 each; Refill: 11    2. Severe obesity (BMI 35.0-39.9) with comorbidity  Overview:  Walking and watching what she eats.       3. Body mass index (BMI) 37.0-37.9, adult  Overview:  BMI elevated at 37.25.      4. Dysuria  -     Urinalysis, Reflex to Urine Culture Urine, Clean Catch; Future; Expected date: 05/12/2025  -     fluconazole (DIFLUCAN) 150 MG Tab; Take 1 tablet (150 mg total) by mouth once daily. for 1  day  Dispense: 1 tablet; Refill: 0    5. Albuminuria  -     Ambulatory referral/consult to Nephrology; Future; Expected date: 05/19/2025  -      Retroperitoneal Complete; Future; Expected date: 05/12/2025    6. Chronic migraine without aura without status migrainosus, not intractable  -     sumatriptan (IMITREX) 100 MG tablet; Take 1 tablet (100 mg total) by mouth daily as needed (migraine).  Dispense: 8 tablet; Refill: 3         No follow-ups on file. In addition to their scheduled follow up, the patient has also been instructed to follow up on as needed basis.     Future Appointments   Date Time Provider Department Center   5/15/2025  1:45 PM Soren Gruber MD [facility] [place]   9/3/2025 11:00 AM [address] [place]   9/3/2025  1:00 PM Olinda Wong MD [facility] [place]        Video Time Documentation:  Spent 10 minutes with patient face to face discussed health concerns. More than 50% of this time was spent in counseling and coordination of care.

## 2025-05-15 ENCOUNTER — APPOINTMENT (OUTPATIENT)
Dept: LAB | Facility: HOSPITAL | Age: 65
End: 2025-05-15
Payer: MEDICARE

## 2025-05-15 ENCOUNTER — PATIENT MESSAGE (OUTPATIENT)
Dept: FAMILY MEDICINE | Facility: CLINIC | Age: 65
End: 2025-05-15
Payer: MEDICARE

## 2025-05-15 ENCOUNTER — OFFICE VISIT (OUTPATIENT)
Dept: ORTHOPEDICS | Facility: CLINIC | Age: 65
End: 2025-05-15
Payer: MEDICARE

## 2025-05-15 VITALS
SYSTOLIC BLOOD PRESSURE: 113 MMHG | HEIGHT: 66 IN | HEART RATE: 73 BPM | WEIGHT: 226.81 LBS | DIASTOLIC BLOOD PRESSURE: 68 MMHG | BODY MASS INDEX: 36.45 KG/M2

## 2025-05-15 DIAGNOSIS — S82.042K CLOSED DISPLACED COMMINUTED FRACTURE OF LEFT PATELLA WITH NONUNION: ICD-10-CM

## 2025-05-15 DIAGNOSIS — M17.12 PRIMARY OSTEOARTHRITIS OF LEFT KNEE: ICD-10-CM

## 2025-05-15 DIAGNOSIS — M17.12 PRIMARY OSTEOARTHRITIS OF LEFT KNEE: Primary | ICD-10-CM

## 2025-05-15 LAB — GRAM STN SPEC: NORMAL

## 2025-05-15 PROCEDURE — 87205 SMEAR GRAM STAIN: CPT

## 2025-05-15 PROCEDURE — 87070 CULTURE OTHR SPECIMN AEROBIC: CPT

## 2025-05-15 PROCEDURE — 87075 CULTR BACTERIA EXCEPT BLOOD: CPT

## 2025-05-15 NOTE — PROGRESS NOTES
Chief Complaint:   Chief Complaint   Patient presents with    Left Knee - Results     Here for LAB Results- LEFT TKA...Lt knee ex fix removal sx 5/29/24,        History of present illness:    History of Present Illness  The patient presents for evaluation of knee pain.    She reports persistent knee pain, which she attributes to her lupus condition. Multiple unsuccessful surgeries on her kneecap were performed by Dr. Kim. Concern is expressed about the potential complications of further surgical interventions. Previous injections for knee pain have not provided significant relief.    Additionally, frequent falls due to instability in her foot necessitate the use of forearm crutches for mobility. A history of plantar fasciitis and bone spurs in her right foot has required surgical intervention. She also utilizes a stand-up walker, a regular walker, and a wheelchair for assistance. However, forearm crutches offer better support and stability.    SOCIAL HISTORY  Marital Status:     Past Medical History:   Diagnosis Date    Acute deep vein thrombosis (DVT) of left lower extremity, unspecified vein 10/12/2023    pt stated not completely resolved however pcp stopped eliquis 1mo ago and dr kim will restart postop per pt    Allergies     Amenorrhea     Anemia     Anxiety     Arthritis     Closed displaced comminuted fracture of left patella, initial encounter     Closed displaced comminuted fracture of left patella, sequela     Depression     Diabetes mellitus, type 2     Digestive disorder     acid reflux    Endometriosis of uterus 1985    Dont remember date    Fibroid     1980    Fibromyalgia     Heart murmur     HLD (hyperlipidemia)     Hypertension     15 plus yrs    Insomnia     Menopause Now    Since age 29    Migraine     Obesity     Pain in left leg     Painful orthopaedic hardware     Plantar fasciitis, right     Pregnancy 10/26/1981    Live birth    SOB (shortness of breath)     STD (sexually  transmitted disease)     1987    Stroke 2005    Surgical wound dehiscence, initial encounter 11/23/2023    Systemic lupus erythematosus, organ or system involvement unspecified     Thyroid disease     No thyroid       Past Surgical History:   Procedure Laterality Date    CATARACT EXTRACTION      CHOLECYSTECTOMY      COLONOSCOPY      ESOPHAGOGASTRODUODENOSCOPY      HYSTERECTOMY      MEHRAN BSO    INCISION AND DRAINAGE, LOWER EXTREMITY Left 11/23/2023    Procedure: INCISION AND DRAINAGE, LOWER EXTREMITY;  Surgeon: Curtis Corona MD;  Location: [facility];  Service: Orthopedics;  Laterality: Left;  I & D left knee for dehiscence/vascular table/bone foam/3L saline with cysto tubing/Vancomycin/Xperience irrigation/13cm Prevena    LUMBAR SPINE SURGERY      MANIPULATION WITH ANESTHESIA Left 05/29/2024    Procedure: MANIPULATION, WITH ANESTHESIA;  Surgeon: Curtis Corona MD;  Location: [facility] OR;  Service: Orthopedics;  Laterality: Left;    OOPHORECTOMY      OPEN REDUCTION AND INTERNAL FIXATION (ORIF) OF FRACTURE OF PATELLA Left 10/27/2023    Procedure: ORIF, FRACTURE, PATELLA, LEFT;  Surgeon: Curtis Corona MD;  Location: [facility] OR;  Service: Orthopedics;  Laterality: Left;  Supine, vascular bed, bone foam, tourniquet  1st case  Synthes small CCHS, 18g wire, locking patella plates    OPEN REDUCTION AND INTERNAL FIXATION (ORIF) OF FRACTURE OF PATELLA Left 04/09/2024    Procedure: REVISION ORIF, FRACTURE, PATELLA;  Surgeon: Curtis Cornoa MD;  Location: [facility] OR;  Service: Orthopedics;  Laterality: Left;    OPEN REDUCTION AND INTERNAL FIXATION (ORIF) OF FRACTURE OF PATELLA Left 04/26/2024    Procedure: ORIF, FRACTURE, PATELLA WITH EX_FIX APPLICATION;  Surgeon: Curtis Corona MD;  Location: [facility];  Service: Orthopedics;  Laterality: Left;  Supine, vascular bed, bone foam, tourniquet  Removal of CCHS screws and application synthes ex fix  #5 fiberwire x 3, hewson suture passer and drill    REMOVAL OF EXTERNAL FIXATION  DEVICE Left 05/29/2024    Procedure: REMOVAL, EXTERNAL FIXATION DEVICE;  Surgeon: Curtis Corona MD;  Location: [facility] OR;  Service: Orthopedics;  Laterality: Left;  1st case  vascular bed  no sterile setup needed  currette and betadine for pin sites  synthes ex fix removal  pt bringing hinge brace    ROTATOR CUFF REPAIR Bilateral     total of 4 surgeries    THYROIDECTOMY      TOTAL HIP ARTHROPLASTY Right     TOTAL KNEE ARTHROPLASTY Right     c       Current Outpatient Medications   Medication Sig    albuterol (PROVENTIL) 2.5 mg /3 mL (0.083 %) nebulizer solution Take 2.5 mg by nebulization 4 (four) times daily as needed for Shortness of Breath or Wheezing.    ALPRAZolam (XANAX) 1 MG tablet Take 1 tablet (1 mg total) by mouth every evening.    blood sugar diagnostic Strp To check BG daily, to use with insurance preferred meter    blood-glucose meter kit To check BG daily, to use with insurance preferred meter    cefdinir (OMNICEF) 300 MG capsule Take 1 capsule (300 mg total) by mouth 2 (two) times daily. for 7 days    clindamycin (CLEOCIN T) 1 % external solution Apply topically 2 (two) times daily.    empagliflozin (JARDIANCE) 10 mg tablet Take 1 tablet (10 mg total) by mouth once daily.    EPINEPHrine (EPIPEN) 0.3 mg/0.3 mL AtIn Inject 1 each into the muscle once.    EScitalopram oxalate (LEXAPRO) 20 MG tablet Take 20 mg by mouth.    hydrOXYzine (ATARAX) 50 MG tablet Take 50 mg by mouth 3 (three) times daily as needed.    lancets Misc To check BG daily, to use with insurance preferred meter    levothyroxine (SYNTHROID) 100 MCG tablet Take 100 mcg by mouth once daily.    liothyronine (CYTOMEL) 5 MCG Tab Take 5 mcg by mouth every morning.    losartan-hydrochlorothiazide 100-12.5 mg (HYZAAR) 100-12.5 mg Tab Take 1 tablet by mouth once daily.    oxybutynin (DITROPAN-XL) 5 MG TR24 Take 1 tablet (5 mg total) by mouth once daily.    oxyCODONE-acetaminophen (PERCOCET)  mg per tablet Take 1 tablet by mouth every 12  (twelve) hours as needed.    promethazine (PHENERGAN) 25 MG tablet Take 25 mg by mouth every 6 (six) hours as needed for Nausea.    rosuvastatin (CRESTOR) 40 MG Tab Take 1 tablet (40 mg total) by mouth once daily.    sumatriptan (IMITREX) 100 MG tablet Take 1 tablet (100 mg total) by mouth daily as needed (migraine).     No current facility-administered medications for this visit.       Review of patient's allergies indicates:   Allergen Reactions    Amlodipine Swelling     Per patient     Azithromycin     Levofloxacin     Lisinopril Edema       Family History   Problem Relation Name Age of Onset    Diabetes Mother Blanche en     Cancer Mother Blanche bergman         Lung cancer    Heart attack Mother Blanche bergman     Heart attack Father Alberto Hernandez     Diabetes Father Alberto Hernandez     Cancer Father Alberto Hernandez         Lung cancer    Rheum arthritis Sister Kelsey hernandez         Lupus    Osteoarthritis Sister Kelsey hernandez     Hypertension Sister Kelsey hernandez     Rashes / Skin problems Sister Kelsey hernandez     Diabetes Sister Rosita Kramer         Diabetes    Hypertension Sister Rosita Kramer     Stroke Sister Rosita Kramer     Migraines Sister Valery keyshawn         Migraines    Diabetes Brother Ariel kramer         Diabetes    Heart failure Brother Ariel kramer         Heart problems       Social History[1]        Review of Systems:    Constitution: Negative for chills, fever, and sweats.  Negative for unexplained weight loss.    HENT:  Negative for headaches and blurry vision.    Cardiovascular:Negative for chest pain or irregular heart beat. Negative for hypertension.    Respiratory:  Negative for cough and shortness of breath.    Gastrointestinal: Negative for abdominal pain, heartburn, melena, nausea, and vomitting.    Genitourinary:  Negative bladder incontinence and dysuria.    Musculoskeletal:  See HPI    Neurological: Negative for numbness.    Psychiatric/Behavioral:  Negative for depression.  The patient is not nervous/anxious.      Endocrine: Negative for polyuria    Hematologic/Lymphatic: Negative for bleeding problem.  Does not bruise/bleed easily.    Skin: Negative for poor would healing and rash      Physical Examination:    Vital Signs:    Vitals:    05/15/25 1400   BP: 113/68   Pulse: 73       Body mass index is 36.61 kg/m².    General: No acute distress, alert and oriented, healthy appearing    HEENT: Head is atraumatic, mucous membranes are moist    Neck: Supples, no JVD    Cardiovascular: Palpable dorsalis pedis and posterior tibial pulses, regular rate and rhythm to those pulses    Lungs: Breathing non-labored    Skin: no rashes appreciated    Neurologic: Can flex and extend knees, ankles, and toes. Sensation is grossly intact    Left knee:  Range of motion of the left knee is limited.  She would like it has flexion to about 90-95.  It had has a well-healed multiple incisions on the anterior knee.  Significant crepitus range of motion noted.    X-rays:      Assessment::  Left knee osteoarthritis    Plan:  And a work her up for infection with an aspiration today.  We will send this off for cultures.  See her back in a few weeks to go over the results.  It had we would always do an injection although with the patient has a leaning towards total knee arthroplasty.    This note was generated with the assistance of ambient listening technology. Verbal consent was obtained by the patient and accompanying visitor(s) for the recording of patient appointment to facilitate this note. I attest to having reviewed and edited the generated note for accuracy, though some syntax or spelling errors may persist. Please contact the author of this note for any clarification.      This note was created using [employer] voice recognition software that occasionally misinterpreted phrases or words.    Consult note is delivered via Epic messaging service.         [1]   Social  History  Socioeconomic History    Marital status:    Tobacco Use    Smoking status: Never    Smokeless tobacco: Never    Tobacco comments:     Never smoked   Substance and Sexual Activity    Alcohol use: Never    Drug use: Not Currently    Sexual activity: Not Currently     Partners: Male     Birth control/protection: None     Comment: Not currently having sex   Social History Narrative    ** Merged History Encounter **          Social Drivers of Health     Financial Resource Strain: Low Risk  (2/20/2025)    Overall Financial Resource Strain (CARDIA)     Difficulty of Paying Living Expenses: Not very hard   Recent Concern: Financial Resource Strain - Medium Risk (2/6/2025)    Overall Financial Resource Strain (CARDIA)     Difficulty of Paying Living Expenses: Somewhat hard   Food Insecurity: Food Insecurity Present (2/20/2025)    Hunger Vital Sign     Worried About Running Out of Food in the Last Year: Sometimes true     Ran Out of Food in the Last Year: Sometimes true   Transportation Needs: No Transportation Needs (2/20/2025)    PRAPARE - Transportation     Lack of Transportation (Medical): No     Lack of Transportation (Non-Medical): No   Physical Activity: Inactive (2/20/2025)    Exercise Vital Sign     Days of Exercise per Week: 0 days     Minutes of Exercise per Session: 0 min   Stress: No Stress Concern Present (2/20/2025)    [place] [employer] - Occupational Stress Questionnaire     Feeling of Stress : Only a little   Recent Concern: Stress - Stress Concern Present (2/6/2025)    [place] [employer] - Occupational Stress Questionnaire     Feeling of Stress : To some extent   Housing Stability: High Risk (2/20/2025)    Housing Stability Vital Sign     Unable to Pay for Housing in the Last Year: Yes     Number of Times Moved in the Last Year: 0     Homeless in the Last Year: No

## 2025-05-15 NOTE — PROCEDURES
Large Joint Aspiration/Injection: L knee    Date/Time: 5/15/2025 1:45 PM    Performed by: Soren Gruber MD  Authorized by: Soren Gruber MD    Consent Done?:  Yes (Verbal)  Indications:  Diagnostic evaluation  Timeout: prior to procedure the correct patient, procedure, and site was verified    Prep: patient was prepped and draped in usual sterile fashion      Local anesthesia used?: Yes    Anesthesia:  Local infiltration  Local anesthetic:  Lidocaine 2% without epinephrine    Details:  Needle Size:  18 G  Ultrasonic Guidance for needle placement?: No    Approach:  Lateral  Location:  Knee  Site:  L knee  Aspirate amount (mL):  2  Aspirate:  Blood-tinged and serous  Patient tolerance:  Patient tolerated the procedure well with no immediate complications

## 2025-05-18 LAB — BACTERIA SPEC ANAEROBE CULT: NORMAL

## 2025-05-20 LAB — BACTERIA FLD CULT: NORMAL

## 2025-05-22 PROBLEM — R79.9 ABNORMAL FINDING OF BLOOD CHEMISTRY, UNSPECIFIED: Status: RESOLVED | Noted: 2025-02-14 | Resolved: 2025-05-22

## 2025-05-25 ENCOUNTER — HOSPITAL ENCOUNTER (EMERGENCY)
Facility: HOSPITAL | Age: 65
Discharge: HOME | End: 2025-05-25
Payer: COMMERCIAL

## 2025-05-25 VITALS
HEART RATE: 56 BPM | HEIGHT: 66 IN | BODY MASS INDEX: 36.48 KG/M2 | SYSTOLIC BLOOD PRESSURE: 128 MMHG | DIASTOLIC BLOOD PRESSURE: 102 MMHG | TEMPERATURE: 98 F | OXYGEN SATURATION: 99 % | WEIGHT: 227 LBS | RESPIRATION RATE: 18 BRPM

## 2025-05-25 DIAGNOSIS — S39.012A LUMBAR STRAIN, INITIAL ENCOUNTER: Primary | ICD-10-CM

## 2025-05-25 DIAGNOSIS — S76.119A: ICD-10-CM

## 2025-05-25 PROCEDURE — 99283 EMERGENCY DEPT VISIT LOW MDM: CPT | Mod: 25

## 2025-05-25 RX ORDER — TIZANIDINE 4 MG/1
4 TABLET ORAL ONCE
Status: DISCONTINUED | OUTPATIENT
Start: 2025-05-25 | End: 2025-05-26 | Stop reason: HOSPADM

## 2025-05-25 RX ORDER — TIZANIDINE 4 MG/1
4 TABLET ORAL EVERY 6 HOURS PRN
Qty: 20 TABLET | Refills: 0 | Status: SHIPPED | OUTPATIENT
Start: 2025-05-25

## 2025-05-26 ENCOUNTER — HOSPITAL ENCOUNTER (OUTPATIENT)
Dept: RADIOLOGY | Facility: HOSPITAL | Age: 65
Discharge: HOME | End: 2025-05-26
Attending: NURSE PRACTITIONER
Payer: MEDICARE

## 2025-05-26 DIAGNOSIS — R80.9 ALBUMINURIA: ICD-10-CM

## 2025-05-26 PROCEDURE — 76770 US EXAM ABDO BACK WALL COMP: CPT | Mod: TC
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
## 2025-05-26 NOTE — DISCHARGE INSTRUCTIONS
Continue your home pain medications.  You can also take the muscle relaxers as prescribed.  Anti-inflammatories like ibuprofen or naproxen can also help the pain.

## 2025-05-29 ENCOUNTER — PATIENT MESSAGE (OUTPATIENT)
Dept: FAMILY MEDICINE | Facility: CLINIC | Age: 65
End: 2025-05-29
Payer: MEDICARE

## 2025-05-29 DIAGNOSIS — F41.9 ANXIETY: ICD-10-CM

## 2025-05-29 DIAGNOSIS — T84.84XA PAINFUL ORTHOPAEDIC HARDWARE: ICD-10-CM

## 2025-05-29 DIAGNOSIS — S82.042K CLOSED DISPLACED COMMINUTED FRACTURE OF LEFT PATELLA WITH NONUNION: Primary | ICD-10-CM

## 2025-05-29 RX ORDER — ALPRAZOLAM 1 MG/1
1 TABLET ORAL NIGHTLY
Qty: 30 TABLET | Refills: 0 | Status: SHIPPED | OUTPATIENT
Start: 2025-05-29

## 2025-06-03 ENCOUNTER — OFFICE VISIT (OUTPATIENT)
Dept: OBSTETRICS AND GYNECOLOGY | Facility: CLINIC | Age: 65
End: 2025-06-03
Payer: MEDICARE

## 2025-06-03 VITALS
BODY MASS INDEX: 36.13 KG/M2 | SYSTOLIC BLOOD PRESSURE: 126 MMHG | WEIGHT: 224.81 LBS | DIASTOLIC BLOOD PRESSURE: 70 MMHG | HEIGHT: 66 IN

## 2025-06-03 DIAGNOSIS — R30.0 DYSURIA: ICD-10-CM

## 2025-06-03 DIAGNOSIS — B37.31 VAGINAL CANDIDA: ICD-10-CM

## 2025-06-03 DIAGNOSIS — N95.2 VAGINAL ATROPHY: ICD-10-CM

## 2025-06-03 DIAGNOSIS — N39.0 URINARY TRACT INFECTION WITH HEMATURIA, SITE UNSPECIFIED: ICD-10-CM

## 2025-06-03 DIAGNOSIS — R14.0 BLOATING: ICD-10-CM

## 2025-06-03 DIAGNOSIS — N39.0 RECURRENT UTI: ICD-10-CM

## 2025-06-03 DIAGNOSIS — N32.81 OAB (OVERACTIVE BLADDER): Primary | ICD-10-CM

## 2025-06-03 DIAGNOSIS — N36.8 URETHRAL PROLAPSE: ICD-10-CM

## 2025-06-03 DIAGNOSIS — N81.11 CYSTOCELE, MIDLINE: ICD-10-CM

## 2025-06-03 DIAGNOSIS — R10.2 PELVIC PRESSURE IN FEMALE: ICD-10-CM

## 2025-06-03 DIAGNOSIS — R31.9 URINARY TRACT INFECTION WITH HEMATURIA, SITE UNSPECIFIED: ICD-10-CM

## 2025-06-03 PROCEDURE — 99214 OFFICE O/P EST MOD 30 MIN: CPT | Mod: ,,, | Performed by: OBSTETRICS & GYNECOLOGY

## 2025-06-03 PROCEDURE — 87186 SC STD MICRODIL/AGAR DIL: CPT | Performed by: OBSTETRICS & GYNECOLOGY

## 2025-06-03 RX ORDER — SULFAMETHOXAZOLE AND TRIMETHOPRIM 800; 160 MG/1; MG/1
1 TABLET ORAL 2 TIMES DAILY
Qty: 14 TABLET | Refills: 0 | Status: SHIPPED | OUTPATIENT
Start: 2025-06-03 | End: 2025-06-10

## 2025-06-03 RX ORDER — FLUCONAZOLE 100 MG/1
200 TABLET ORAL ONCE
Qty: 1 TABLET | Refills: 0 | Status: SHIPPED | OUTPATIENT
Start: 2025-06-03 | End: 2025-06-03

## 2025-06-04 ENCOUNTER — TELEPHONE (OUTPATIENT)
Dept: OBSTETRICS AND GYNECOLOGY | Facility: CLINIC | Age: 65
End: 2025-06-04
Payer: MEDICARE

## 2025-06-04 DIAGNOSIS — N39.0 RECURRENT UTI: ICD-10-CM

## 2025-06-04 DIAGNOSIS — N95.2 VAGINAL ATROPHY: ICD-10-CM

## 2025-06-04 DIAGNOSIS — N36.8 URETHRAL PROLAPSE: ICD-10-CM

## 2025-06-05 ENCOUNTER — OFFICE VISIT (OUTPATIENT)
Dept: ORTHOPEDICS | Facility: CLINIC | Age: 65
End: 2025-06-05
Payer: MEDICARE

## 2025-06-05 VITALS
HEART RATE: 66 BPM | DIASTOLIC BLOOD PRESSURE: 67 MMHG | HEIGHT: 66 IN | SYSTOLIC BLOOD PRESSURE: 111 MMHG | BODY MASS INDEX: 36.38 KG/M2 | WEIGHT: 226.38 LBS

## 2025-06-05 DIAGNOSIS — M17.12 PRIMARY OSTEOARTHRITIS OF LEFT KNEE: Primary | ICD-10-CM

## 2025-06-05 RX ORDER — BETAMETHASONE SODIUM PHOSPHATE AND BETAMETHASONE ACETATE 3; 3 MG/ML; MG/ML
12 INJECTION, SUSPENSION INTRA-ARTICULAR; INTRALESIONAL; INTRAMUSCULAR; SOFT TISSUE
Status: DISCONTINUED | OUTPATIENT
Start: 2025-06-05 | End: 2025-06-05 | Stop reason: HOSPADM

## 2025-06-05 RX ORDER — LIDOCAINE HYDROCHLORIDE 20 MG/ML
5 INJECTION, SOLUTION EPIDURAL; INFILTRATION; INTRACAUDAL; PERINEURAL
Status: DISCONTINUED | OUTPATIENT
Start: 2025-06-05 | End: 2025-06-05 | Stop reason: HOSPADM

## 2025-06-05 RX ADMIN — LIDOCAINE HYDROCHLORIDE 5 ML: 20 INJECTION, SOLUTION EPIDURAL; INFILTRATION; INTRACAUDAL; PERINEURAL at 09:06

## 2025-06-05 RX ADMIN — BETAMETHASONE SODIUM PHOSPHATE AND BETAMETHASONE ACETATE 12 MG: 3; 3 INJECTION, SUSPENSION INTRA-ARTICULAR; INTRALESIONAL; INTRAMUSCULAR; SOFT TISSUE at 09:06

## 2025-06-06 ENCOUNTER — HOSPITAL ENCOUNTER (OUTPATIENT)
Dept: RADIOLOGY | Facility: HOSPITAL | Age: 65
Discharge: HOME | End: 2025-06-06
Attending: OBSTETRICS & GYNECOLOGY
Payer: MEDICARE

## 2025-06-06 DIAGNOSIS — R10.2 PELVIC PRESSURE IN FEMALE: ICD-10-CM

## 2025-06-06 LAB — BACTERIA UR CULT: ABNORMAL

## 2025-06-06 PROCEDURE — 76830 TRANSVAGINAL US NON-OB: CPT | Mod: TC

## 2025-06-09 ENCOUNTER — RESULTS FOLLOW-UP (OUTPATIENT)
Dept: OBSTETRICS AND GYNECOLOGY | Facility: CLINIC | Age: 65
End: 2025-06-09

## 2025-06-09 DIAGNOSIS — N39.0 RECURRENT UTI: Primary | ICD-10-CM

## 2025-06-09 RX ORDER — CEFDINIR 300 MG/1
300 CAPSULE ORAL 2 TIMES DAILY
Qty: 14 CAPSULE | Refills: 0 | Status: SHIPPED | OUTPATIENT
Start: 2025-06-09 | End: 2025-06-16

## 2025-06-12 ENCOUNTER — OFFICE VISIT (OUTPATIENT)
Dept: FAMILY MEDICINE | Facility: CLINIC | Age: 65
End: 2025-06-12
Payer: MEDICARE

## 2025-06-12 VITALS
BODY MASS INDEX: 36.39 KG/M2 | OXYGEN SATURATION: 99 % | HEIGHT: 66 IN | RESPIRATION RATE: 16 BRPM | HEART RATE: 84 BPM | DIASTOLIC BLOOD PRESSURE: 65 MMHG | SYSTOLIC BLOOD PRESSURE: 111 MMHG | TEMPERATURE: 98 F | WEIGHT: 226.44 LBS

## 2025-06-12 DIAGNOSIS — F41.9 ANXIETY AND DEPRESSION: Primary | ICD-10-CM

## 2025-06-12 DIAGNOSIS — T84.84XA PAINFUL ORTHOPAEDIC HARDWARE: ICD-10-CM

## 2025-06-12 DIAGNOSIS — E11.9 TYPE 2 DIABETES MELLITUS WITHOUT COMPLICATION, WITHOUT LONG-TERM CURRENT USE OF INSULIN: ICD-10-CM

## 2025-06-12 DIAGNOSIS — Z78.0 POSTMENOPAUSAL: ICD-10-CM

## 2025-06-12 DIAGNOSIS — E11.9 TYPE 2 DIABETES MELLITUS WITHOUT COMPLICATION, WITHOUT LONG-TERM CURRENT USE OF INSULIN: Primary | ICD-10-CM

## 2025-06-12 DIAGNOSIS — F32.A ANXIETY AND DEPRESSION: Primary | ICD-10-CM

## 2025-06-12 DIAGNOSIS — N81.11 CYSTOCELE, MIDLINE: ICD-10-CM

## 2025-06-12 DIAGNOSIS — N36.8 URETHRAL PROLAPSE: ICD-10-CM

## 2025-06-12 PROCEDURE — 99215 OFFICE O/P EST HI 40 MIN: CPT | Mod: PBBFAC | Performed by: NURSE PRACTITIONER

## 2025-06-12 PROCEDURE — 99999 PR PBB SHADOW E&M-EST. PATIENT-LVL V: CPT | Mod: PBBFAC,,, | Performed by: NURSE PRACTITIONER

## 2025-06-12 PROCEDURE — 99214 OFFICE O/P EST MOD 30 MIN: CPT | Mod: S$PBB,,, | Performed by: NURSE PRACTITIONER

## 2025-06-12 PROCEDURE — G2211 COMPLEX E/M VISIT ADD ON: HCPCS | Mod: ,,, | Performed by: NURSE PRACTITIONER

## 2025-06-12 RX ORDER — ESCITALOPRAM OXALATE 20 MG/1
20 TABLET ORAL DAILY
Qty: 90 TABLET | Refills: 3 | Status: SHIPPED | OUTPATIENT
Start: 2025-06-12

## 2025-06-12 NOTE — PROGRESS NOTES
Family Medicine    Patient ID: [id number]     Chief Complaint: Follow-up      HPI:     Briseyda Torres is here today for  follow-up.    Past Medical History:   Diagnosis Date    Abnormal finding of blood chemistry, unspecified 02/14/2025    FBG is .      Acute deep vein thrombosis (DVT) of left lower extremity, unspecified vein 10/12/2023    pt stated not completely resolved however pcp stopped eliquis 1mo ago and dr kim will restart postop per pt    Allergies     Amenorrhea     Anemia     Anxiety     Arthritis     Closed displaced comminuted fracture of left patella, initial encounter     Closed displaced comminuted fracture of left patella, sequela     Depression     Diabetes mellitus, type 2     Digestive disorder     acid reflux    Endometriosis of uterus 1985    Dont remember date    Fibroid     1980    Fibromyalgia     Heart murmur     HLD (hyperlipidemia)     Hypertension     15 plus yrs    Insomnia     Menopause Now    Since age 29    Migraine     Obesity     Pain in left leg     Painful orthopaedic hardware     Plantar fasciitis, right     Pregnancy 10/26/1981    Live birth    SOB (shortness of breath)     STD (sexually transmitted disease)     1987    Stroke 2005    Surgical wound dehiscence, initial encounter 11/23/2023    Systemic lupus erythematosus, organ or system involvement unspecified     Thyroid disease     No thyroid        Past Surgical History:   Procedure Laterality Date    CATARACT EXTRACTION      CHOLECYSTECTOMY      COLONOSCOPY      ESOPHAGOGASTRODUODENOSCOPY      HYSTERECTOMY      MEHRAN BSO    INCISION AND DRAINAGE, LOWER EXTREMITY Left 11/23/2023    Procedure: INCISION AND DRAINAGE, LOWER EXTREMITY;  Surgeon: Curtis Kim MD;  Location: [facility];  Service: Orthopedics;  Laterality: Left;  I & D left knee for dehiscence/vascular table/bone foam/3L saline with cysto tubing/Vancomycin/Xperience irrigation/13cm Prevena    LUMBAR SPINE SURGERY      MANIPULATION WITH ANESTHESIA  Left 05/29/2024    Procedure: MANIPULATION, WITH ANESTHESIA;  Surgeon: Curtis Corona MD;  Location: [facility] OR;  Service: Orthopedics;  Laterality: Left;    OOPHORECTOMY      OPEN REDUCTION AND INTERNAL FIXATION (ORIF) OF FRACTURE OF PATELLA Left 10/27/2023    Procedure: ORIF, FRACTURE, PATELLA, LEFT;  Surgeon: Curtis Corona MD;  Location: [facility] OR;  Service: Orthopedics;  Laterality: Left;  Supine, vascular bed, bone foam, tourniquet  1st case  Synthes small CCHS, 18g wire, locking patella plates    OPEN REDUCTION AND INTERNAL FIXATION (ORIF) OF FRACTURE OF PATELLA Left 04/09/2024    Procedure: REVISION ORIF, FRACTURE, PATELLA;  Surgeon: Curtis Corona MD;  Location: [facility] OR;  Service: Orthopedics;  Laterality: Left;    OPEN REDUCTION AND INTERNAL FIXATION (ORIF) OF FRACTURE OF PATELLA Left 04/26/2024    Procedure: ORIF, FRACTURE, PATELLA WITH EX_FIX APPLICATION;  Surgeon: Curtis Corona MD;  Location: [facility] OR;  Service: Orthopedics;  Laterality: Left;  Supine, vascular bed, bone foam, tourniquet  Removal of CCHS screws and application synthes ex fix  #5 fiberwire x 3, hewson suture passer and drill    REMOVAL OF EXTERNAL FIXATION DEVICE Left 05/29/2024    Procedure: REMOVAL, EXTERNAL FIXATION DEVICE;  Surgeon: Curtis Corona MD;  Location: [facility] OR;  Service: Orthopedics;  Laterality: Left;  1st case  vascular bed  no sterile setup needed  currette and betadine for pin sites  synthes ex fix removal  pt bringing hinge brace    ROTATOR CUFF REPAIR Bilateral     total of 4 surgeries    THYROIDECTOMY      TOTAL HIP ARTHROPLASTY Right     TOTAL KNEE ARTHROPLASTY Right     c        Social History     Tobacco Use    Smoking status: Never    Smokeless tobacco: Never    Tobacco comments:     Never smoked   Substance and Sexual Activity    Alcohol use: Never    Drug use: Not Currently    Sexual activity: Not Currently     Partners: Male     Birth control/protection: None     Comment: Not currently having sex         Current Outpatient Medications   Medication Instructions    albuterol (PROVENTIL) 2.5 mg, 4 times daily PRN    ALPRAZolam (XANAX) 1 mg, Oral, Nightly    cefdinir (OMNICEF) 300 mg, Oral, 2 times daily    clindamycin (CLEOCIN T) 1 % external solution Topical (Top), 2 times daily    conjugated estrogens (PREMARIN) 0.5 g, Vaginal, Twice weekly    empagliflozin (JARDIANCE) 10 mg, Oral, Daily    EPINEPHrine (EPIPEN) 0.3 mg/0.3 mL AtIn 1 each, Once    EScitalopram oxalate (LEXAPRO) 20 mg, Oral, Daily    hydrOXYzine (ATARAX) 50 mg, 3 times daily PRN    levothyroxine (SYNTHROID) 100 mcg, Daily    liothyronine (CYTOMEL) 5 mcg, Every morning    losartan-hydrochlorothiazide 100-12.5 mg (HYZAAR) 100-12.5 mg Tab 1 tablet, Daily    oxybutynin (DITROPAN-XL) 5 mg, Oral, Daily    oxyCODONE-acetaminophen (PERCOCET)  mg per tablet 1 tablet, Every 12 hours PRN    promethazine (PHENERGAN) 25 mg, Every 6 hours PRN    rosuvastatin (CRESTOR) 40 mg, Oral, Daily    sumatriptan (IMITREX) 100 mg, Oral, Daily PRN       Review of patient's allergies indicates:   Allergen Reactions    Amlodipine Swelling     Per patient     Azithromycin     Levofloxacin     Lisinopril Edema        Patient Care Team:  Oanh Chan FNP as PCP - General (Family Medicine)  Curtis Corona MD as Consulting Physician (Orthopedic Surgery)  Olinda Wong MD as Consulting Physician (Breast Surgery)  [facility] as Chronic  (Pain Medicine)     Subjective:     Review of Systems   Genitourinary:  Positive for dyspareunia and vaginal pain (prolapse). Negative for hematuria.   Allergic/Immunologic: Negative.    Neurological: Negative.    Hematological: Negative.    Psychiatric/Behavioral: Negative.  Negative for suicidal ideas.    All other systems reviewed and are negative.      12 point review of systems conducted, negative except as stated in the history of present illness. See HPI for details.    Objective:     Visit Vitals  BP  "111/65   Pulse 84   Temp 98 °F (36.7 °C) (Temporal)   Resp 16   Ht 5' 6" (1.676 m)   Wt 102.7 kg (226 lb 6.6 oz)   SpO2 99%   BMI 36.54 kg/m²       Physical Exam  Vitals and nursing note reviewed.   Constitutional:       General: She is not in acute distress.     Appearance: She is not ill-appearing.   HENT:      Head: Normocephalic and atraumatic.   Eyes:      General: No scleral icterus.     Extraocular Movements: Extraocular movements intact.      Conjunctiva/sclera: Conjunctivae normal.      Pupils: Pupils are equal, round, and reactive to light.   Neck:      Vascular: No carotid bruit.   Cardiovascular:      Rate and Rhythm: Normal rate and regular rhythm.   Pulmonary:      Effort: Pulmonary effort is normal. No respiratory distress.      Breath sounds: Normal breath sounds. No wheezing, rhonchi or rales.   Abdominal:      Palpations: Abdomen is soft.   Musculoskeletal:         General: Tenderness (left knee) present. Normal range of motion.      Cervical back: Normal range of motion and neck supple.   Skin:     General: Skin is warm and dry.   Neurological:      General: No focal deficit present.      Mental Status: She is alert.   Psychiatric:         Mood and Affect: Mood normal.         Labs Reviewed:   Labs reviewed with patient, verbalized understanding.  Chemistry:  Lab Results   Component Value Date     05/08/2025    K 4.0 05/08/2025    BUN 15 05/08/2025    CREATININE 1.00 05/08/2025    EGFRNORACEVR 63 05/08/2025    CALCIUM 10.1 05/08/2025    ALKPHOS 105 05/08/2025    ALBUMIN 4.7 05/08/2025    AST 31 05/08/2025    ALT 27 05/08/2025    MG 2.10 11/27/2023    PHOS 3.6 11/23/2023    [id number] 31 05/08/2025    TSH 0.445 02/14/2025    HJSGOW5WKKG 0.93 02/14/2025        Lab Results   Component Value Date    HGBA1C 6.3 (H) 05/08/2025        Hematology:  Lab Results   Component Value Date    WBC 8.13 05/08/2025    HGB 13.1 05/08/2025    HCT 39.7 05/08/2025     05/08/2025       Lipid Panel:  Lab " Results   Component Value Date    CHOL 169 05/08/2025    HDL 56 05/08/2025    TRIG 184 05/08/2025        Urine:  Lab Results   Component Value Date    APPEARANCEUA Clear 06/06/2025    SGUA <=1.005 06/06/2025    PROTEINUA Negative 06/06/2025    KETONESUA Negative 06/06/2025    LEUKOCYTESUR Negative 06/06/2025    CREATRANDUR 210.3 (H) 05/08/2025        Assessment:       ICD-10-CM ICD-9-CM   1. Type 2 diabetes mellitus without complication, without long-term current use of insulin  E11.9 250.00   2. Postmenopausal  Z78.0 V49.81   3. Painful orthopaedic hardware  T84.84XA 996.78   4. Cystocele, midline  N81.11 618.01   5. Urethral prolapse  N36.8 599.5        Plan:     1. Type 2 diabetes mellitus without complication, without long-term current use of insulin  Overview:  A1c 6.5, Advised starting metformin, 500mg BID.    Assessment & Plan:   Patient's A1c elevated does not use insulin, is monitoring her blood sugars.    Orders:  -     Foot Exam Performed    2. Postmenopausal  -     DXA Bone Density Axial Skeleton 1 or more sites; Future; Expected date: 06/12/2025    3. Painful orthopaedic hardware  Assessment & Plan:    Painful hardware to the left knee, needs replacement, put on hold for gyn surgery.      4. Cystocele, midline  Assessment & Plan:   Cystocele, patient sees Dr. Hagan.      5. Urethral prolapse  Assessment & Plan:    Patient in clinic notes prolapse,  sees Dr. Giovanni Hagan in [place], testing.           Follow up in about 6 weeks (around 7/24/2025) for routine follow up. In addition to their scheduled follow up, the patient has also been instructed to follow up on as needed basis.     Future Appointments   Date Time Provider Department Center   7/24/2025 11:10 AM Giovanni Hagan MD [facility] [place] [facility]   9/3/2025 11:00 AM [address] [place]   9/3/2025  1:00 PM Olinda Wong MD [facility] [place]   9/9/2025  1:00 PM [facility] [place] [facility] [place]

## 2025-06-13 NOTE — ASSESSMENT & PLAN NOTE
Patient in clinic notes prolapse,  sees Dr. Giovanni Hagan in [place], [facility].

## 2025-06-17 ENCOUNTER — PATIENT MESSAGE (OUTPATIENT)
Dept: FAMILY MEDICINE | Facility: CLINIC | Age: 65
End: 2025-06-17
Payer: MEDICARE

## 2025-06-20 ENCOUNTER — PATIENT MESSAGE (OUTPATIENT)
Dept: FAMILY MEDICINE | Facility: CLINIC | Age: 65
End: 2025-06-20
Payer: MEDICARE

## 2025-06-25 ENCOUNTER — TELEPHONE (OUTPATIENT)
Dept: OBSTETRICS AND GYNECOLOGY | Facility: CLINIC | Age: 65
End: 2025-06-25

## 2025-06-25 ENCOUNTER — PATIENT MESSAGE (OUTPATIENT)
Dept: FAMILY MEDICINE | Facility: CLINIC | Age: 65
End: 2025-06-25
Payer: MEDICARE

## 2025-06-25 ENCOUNTER — OFFICE VISIT (OUTPATIENT)
Dept: OBSTETRICS AND GYNECOLOGY | Facility: CLINIC | Age: 65
End: 2025-06-25
Payer: MEDICARE

## 2025-06-25 VITALS
SYSTOLIC BLOOD PRESSURE: 118 MMHG | DIASTOLIC BLOOD PRESSURE: 68 MMHG | WEIGHT: 226 LBS | HEIGHT: 66 IN | BODY MASS INDEX: 36.32 KG/M2

## 2025-06-25 DIAGNOSIS — R30.0 DYSURIA: Primary | ICD-10-CM

## 2025-06-25 DIAGNOSIS — F41.9 ANXIETY: ICD-10-CM

## 2025-06-25 DIAGNOSIS — G43.709 CHRONIC MIGRAINE WITHOUT AURA WITHOUT STATUS MIGRAINOSUS, NOT INTRACTABLE: ICD-10-CM

## 2025-06-25 DIAGNOSIS — N36.8 URETHRAL PROLAPSE: ICD-10-CM

## 2025-06-25 DIAGNOSIS — Z11.3 SCREENING EXAMINATION FOR VENEREAL DISEASE: ICD-10-CM

## 2025-06-25 DIAGNOSIS — N90.89 VULVAR IRRITATION: ICD-10-CM

## 2025-06-25 DIAGNOSIS — R31.9 HEMATURIA, UNSPECIFIED TYPE: ICD-10-CM

## 2025-06-25 LAB
BACTERIA HYPHAE, POC: NEGATIVE
BILIRUB UR QL STRIP: NEGATIVE
GARDNERELLA VAGINALIS: NEGATIVE
GLUCOSE UR QL STRIP: POSITIVE
KETONES UR QL STRIP: NEGATIVE
LEUKOCYTE ESTERASE UR QL STRIP: NEGATIVE
OTHER MICROSC. OBSERVATIONS: NORMAL
PH, POC UA: 5.5
POC BACTERIAL VAGINOSIS: NEGATIVE
POC BLOOD, URINE: POSITIVE
POC CLUE CELLS: NEGATIVE
POC NITRATES, URINE: NEGATIVE
PROT UR QL STRIP: NEGATIVE
SP GR UR STRIP: 1.01 (ref 1–1.03)
TRICHOMONAS, POC: NEGATIVE
UROBILINOGEN UR STRIP-ACNC: 0.2 (ref 0.1–1.1)
YEAST WET PREP: NEGATIVE
YEAST, POC: NEGATIVE

## 2025-06-25 PROCEDURE — 99213 OFFICE O/P EST LOW 20 MIN: CPT | Mod: ,,,

## 2025-06-25 PROCEDURE — 81003 URINALYSIS AUTO W/O SCOPE: CPT | Mod: QW,RHCUB

## 2025-06-25 RX ORDER — PHENAZOPYRIDINE HYDROCHLORIDE 200 MG/1
200 TABLET, FILM COATED ORAL 3 TIMES DAILY PRN
Qty: 9 TABLET | Refills: 0 | Status: SHIPPED | OUTPATIENT
Start: 2025-06-25 | End: 2025-06-28

## 2025-06-25 RX ORDER — NITROFURANTOIN 25; 75 MG/1; MG/1
100 CAPSULE ORAL 2 TIMES DAILY
Qty: 14 CAPSULE | Refills: 0 | Status: SHIPPED | OUTPATIENT
Start: 2025-06-25 | End: 2025-07-02

## 2025-06-25 RX ORDER — CLOTRIMAZOLE AND BETAMETHASONE DIPROPIONATE 10; .64 MG/G; MG/G
CREAM TOPICAL 2 TIMES DAILY
Qty: 15 G | Refills: 0 | Status: SHIPPED | OUTPATIENT
Start: 2025-06-25 | End: 2025-07-02

## 2025-06-25 NOTE — PROGRESS NOTES
"Chief Complaint:  burning urination (C/o burning urination)    History of Present Illness:  Briseyda Torres is a 65 y.o.  who presents with Mild urethral prolapse and OAB presents with bladder pressure.       Gyn History:  Menstrual History  Cycle: No  Menarche Age: 17 years  No Cycle Reason: (!) Surgical  Surgical Reason: hysterectomy    Menopause  Menopause Age: 29 years  Post Menopausal Bleeding: No  Hormone Replacement Therapy: Yes (Premarin)    Pap History  HPV Vaccine Completed: No (0/3)    [place]  Sexually Active: No  STI History: Yes  Contraception: No    Breast History  Last Breast Imaging Date: Yes  Date: 24  History of Abnormal Breast Imaging : No (abn per pt)    PER PREVIOUS NOTE 2025  Patient is a 65 y.o.  presents today with complaints of pelvic pressure, "intense", abdominal swelling/bloating, urinary frequency. States, "feels like peeing out razor blades". Will have sensation of bladder not fully being emptied.   Reports no improvement in OAB with Oxybutynin.    Hx of recurrent UTIs and yeast infections.     PLAN  UA: glucose, blood, leukocytes, nitrates   Urine culture   PO hydration, strict precautions   Rx Bactrim   Rx Diflucan 200mg at pt request     Pelvic u/s due to swelling, bloating, pelvic pressure     Discussed urethral prolapse, anterior defect noted on today's exam.   Discussed treatment options with pt including conservative, surgical, pessary. Discussed Cystocele repair in detail with pt including potential risks.   Pt unsure if she desires to move forward at this time due to potentially undergoing knee replacement.    Recommend continuation of Kegel exercises      Recommend vaginal estrogen replacement for recurrent UTI, Prolapse, vaginal atrophy   Discussed relative safety of this vaginal HRT vs systemic HRT.  She would like to proceed.  Rx Premarin cream. Place 0.5 g vaginally q HS x7 days then 2-3 times weekly   If cost prohibited to call office and " "will send to [facility].   Educated on potential yeast infection while initiating Premarin due to PH change.       RTC  6 weeks follow up      PER PREVIOUS NOTE 25  Patient is a 64 y.o.  presents today referred by Oanh Chan to establish Gyn care. Hx of hysterectomy due to endometriosis in her late 20s. Hx of benign breast BX, done with Dr Hernandez in [place], next appt in 5 months. C/o recurrent yeast infections, under breast. Discontinued Jardiance recently for prediabetes. Cologuard 2025 negative. Hx of Lupus, will be undergoing knee replacement soon.      C/o frequent and urgency with urination and several times per night nocturia. Denies dysuria.      Patient is currently on estradiol estrogen replacement and says she has been on this for years.  She does have occasional hot flashes.  Importantly she has had a history of 2 DVTs recently and a history of a CVA in     Review of Systems:  General/Constitutional: Chills denies. Fatigue/weakness denies. Fever denies. Night sweats denies. Hot flashes denies  Gastrointestinal: Abdominal pain denies. Blood in stool denies. Constipation denies. Diarrhea denies. Heartburn denies. Nausea denies. Vomiting denies   Genitourinary: Incontinence denies. Blood in urine denies. Frequent urination denies. Urgency denies. Painful urination denies. Nocturia denies       Current Medications[1]    Review of patient's allergies indicates:   Allergen Reactions    Amlodipine Swelling     Per patient     Azithromycin     Levofloxacin     Lisinopril Edema       Social History[2]    Physical Exam:  /68 (BP Location: Left arm, Patient Position: Sitting)   Ht 5' 6" (1.676 m)   Wt 102.5 kg (226 lb)   BMI 36.48 kg/m²     Chaperone present.       Constitutional: General appearance: healthy, well-nourished and well-developed  Psychiatric:  Orientation to time, place and person. Normal mood and affect and active, alert   Abdomen: Auscultation/Inspection/Palpation: No " tenderness or masses. Soft, nondistended      Female Genitalia:      Vulva: no masses, atrophy or lesions      Bladder/Urethra: no urethral discharge or mass, normal meatus, bladder non-distended. +Mild urethra prolapse     Vagina: no tenderness, erythema, cystocele, rectocele, abnormal vaginal discharge, or vesicle(s) or ulcers +moister associated excoriation to bilateral introits      Cervix: no discharge or cervical motion tenderness and grossly normal     Uterus: normal size and shape and midline, non-tender, and no uterine prolapse.     Adnexa/Parametria: no parametrial tenderness or mass, no adnexal tenderness or ovarian mass.     Assessment/Plan:  1. Dysuria  2. Hematuria, unspecified type  Educated, UA (trace-intact blood, 3+ protein)  Discussed urinalysis results and patients symptoms  Culture sent to lab  Advised to to call if symptoms do not improve within 24 hrs or if she develops fever  Rx: Macrobid 100mg BID x7days  Rx: pyridium 200mg prn q8hrs    3. Vulvar irritation  One Swab: Myco/urea/Leuk/ Av/BV/CV  Wet prep: clue + whiff   Educated   Flagyl 500mg BID x7 days  AVOID: Scented soaps or shampoos, bubble bath, scented detergents, feminine sprays, douches, powders        Fragrance-free pH neutral soap     Unscented detergents   No douching   Call office if no improvement in 72 hours   Rx: Lotrisone cream BID x7 days    Keep scheduled f/u with Dr. Hagan     Future Appointments   Date Time Provider Department Center   7/24/2025 11:10 AM [facility]   9/3/2025 11:00 AM [address] [place]   9/3/2025  1:00 PM [facility]   9/9/2025  1:00 PM Soren [place] MD [facility] [place]     This note was transcribed by Jessica Mya. There may be transcription errors as a result, however minimal. Effort has been made to ensure accuracy of transcription, but any obvious errors or omissions should be clarified with the  author of the document.              [1]   Current Outpatient Medications:     albuterol (PROVENTIL) 2.5 mg /3 mL (0.083 %) nebulizer solution, Take 2.5 mg by nebulization 4 (four) times daily as needed for Shortness of Breath or Wheezing., Disp: , Rfl:     ALPRAZolam (XANAX) 1 MG tablet, Take 1 tablet (1 mg total) by mouth every evening., Disp: 30 tablet, Rfl: 0    clindamycin (CLEOCIN T) 1 % external solution, Apply topically 2 (two) times daily., Disp: 60 mL, Rfl: 1    conjugated estrogens (PREMARIN) vaginal cream, Place 0.5 g vaginally twice a week., Disp: 1 applicator, Rfl: 6    empagliflozin (JARDIANCE) 10 mg tablet, Take 1 tablet (10 mg total) by mouth once daily., Disp: 90 tablet, Rfl: 1    EPINEPHrine (EPIPEN) 0.3 mg/0.3 mL AtIn, Inject 1 each into the muscle once., Disp: , Rfl:     EScitalopram oxalate (LEXAPRO) 20 MG tablet, Take 1 tablet (20 mg total) by mouth once daily., Disp: 90 tablet, Rfl: 3    levothyroxine (SYNTHROID) 100 MCG tablet, Take 100 mcg by mouth once daily., Disp: , Rfl:     liothyronine (CYTOMEL) 5 MCG Tab, Take 5 mcg by mouth every morning., Disp: , Rfl:     losartan-hydrochlorothiazide 100-12.5 mg (HYZAAR) 100-12.5 mg Tab, Take 1 tablet by mouth once daily., Disp: , Rfl:     oxybutynin (DITROPAN-XL) 5 MG TR24, Take 1 tablet (5 mg total) by mouth once daily., Disp: 30 tablet, Rfl: 11    oxyCODONE-acetaminophen (PERCOCET)  mg per tablet, Take 1 tablet by mouth every 12 (twelve) hours as needed., Disp: , Rfl:     promethazine (PHENERGAN) 25 MG tablet, Take 25 mg by mouth every 6 (six) hours as needed for Nausea., Disp: , Rfl:     rosuvastatin (CRESTOR) 40 MG Tab, Take 1 tablet (40 mg total) by mouth once daily., Disp: 90 tablet, Rfl: 3    sumatriptan (IMITREX) 100 MG tablet, Take 1 tablet (100 mg total) by mouth daily as needed (migraine)., Disp: 8 tablet, Rfl: 3  [2]   Social History  Socioeconomic History    Marital status:    Tobacco Use    Smoking status: Never     Smokeless tobacco: Never    Tobacco comments:     Never smoked   Substance and Sexual Activity    Alcohol use: Never    Drug use: Not Currently    Sexual activity: Not Currently     Partners: Male     Birth control/protection: None     Comment: Not currently having sex   Social History Narrative    ** Merged History Encounter **          Social Drivers of Health     Financial Resource Strain: Low Risk  (2/20/2025)    Overall Financial Resource Strain (CARDIA)     Difficulty of Paying Living Expenses: Not very hard   Recent Concern: Financial Resource Strain - Medium Risk (2/6/2025)    Overall Financial Resource Strain (CARDIA)     Difficulty of Paying Living Expenses: Somewhat hard   Food Insecurity: Food Insecurity Present (2/20/2025)    Hunger Vital Sign     Worried About Running Out of Food in the Last Year: Sometimes true     Ran Out of Food in the Last Year: Sometimes true   Transportation Needs: No Transportation Needs (2/20/2025)    PRAPARE - Transportation     Lack of Transportation (Medical): No     Lack of Transportation (Non-Medical): No   Physical Activity: Inactive (2/20/2025)    Exercise Vital Sign     Days of Exercise per Week: 0 days     Minutes of Exercise per Session: 0 min   Stress: No Stress Concern Present (2/20/2025)    [place] [employer] - Occupational Stress Questionnaire     Feeling of Stress : Only a little   Recent Concern: Stress - Stress Concern Present (2/6/2025)    [place] [employer] - Occupational Stress Questionnaire     Feeling of Stress : To some extent   Housing Stability: High Risk (2/20/2025)    Housing Stability Vital Sign     Unable to Pay for Housing in the Last Year: Yes     Number of Times Moved in the Last Year: 0     Homeless in the Last Year: No

## 2025-06-25 NOTE — TELEPHONE ENCOUNTER
Pt called office with complaints of burning with urination, urinary retention. States took medication Rx: Omnicef 300 mg PO BDI x 7 days on 6/9 for +E Coli UTI. States believes medication to have not helped. Scheduled for appt today for UA. Precautions. Voiced understanding.

## 2025-06-26 ENCOUNTER — RESULTS FOLLOW-UP (OUTPATIENT)
Dept: OBSTETRICS AND GYNECOLOGY | Facility: CLINIC | Age: 65
End: 2025-06-26

## 2025-06-26 DIAGNOSIS — F41.9 ANXIETY: ICD-10-CM

## 2025-06-26 RX ORDER — ALPRAZOLAM 1 MG/1
1 TABLET ORAL NIGHTLY
Qty: 30 TABLET | Refills: 0 | Status: SHIPPED | OUTPATIENT
Start: 2025-06-26

## 2025-06-26 RX ORDER — SUMATRIPTAN SUCCINATE 100 MG/1
100 TABLET ORAL DAILY PRN
Qty: 8 TABLET | Refills: 3 | Status: SHIPPED | OUTPATIENT
Start: 2025-06-26

## 2025-06-27 LAB
BACTERIA UR CULT: ABNORMAL
BACTERIA UR CULT: ABNORMAL

## 2025-06-29 NOTE — TRANSFER OF CARE
"Anesthesia Transfer of Care Note    Patient: Briseyda Torres    Procedure(s) Performed: Procedure(s) (LRB):  ORIF, FRACTURE, PATELLA WITH EX_FIX APPLICATION (Left)    Patient location: PACU    Anesthesia Type: general    Transport from OR: Transported from OR on room air with adequate spontaneous ventilation    Post pain: adequate analgesia    Post assessment: no apparent anesthetic complications    Post vital signs: stable    Level of consciousness: sedated    Nausea/Vomiting: no nausea/vomiting    Complications: none    Transfer of care protocol was followed      Last vitals: Visit Vitals  /79   Pulse 89   Temp 36.5 °C (97.7 °F)   Resp 11   Ht 5' 6" (1.676 m)   Wt 98.2 kg (216 lb 7.9 oz)   SpO2 96%   Breastfeeding No   BMI 34.94 kg/m²     " 70 year old female presenting with acute hypoxemic respiratory failure secondary to acute decompensated heart failure.

## 2025-07-02 ENCOUNTER — PATIENT MESSAGE (OUTPATIENT)
Dept: FAMILY MEDICINE | Facility: CLINIC | Age: 65
End: 2025-07-02
Payer: MEDICARE

## 2025-07-02 ENCOUNTER — PATIENT MESSAGE (OUTPATIENT)
Dept: OBSTETRICS AND GYNECOLOGY | Facility: CLINIC | Age: 65
End: 2025-07-02
Payer: MEDICARE

## 2025-07-02 DIAGNOSIS — R11.0 NAUSEA: Primary | ICD-10-CM

## 2025-07-03 RX ORDER — PROMETHAZINE HYDROCHLORIDE 25 MG/1
25 TABLET ORAL EVERY 6 HOURS PRN
Qty: 30 TABLET | Refills: 0 | Status: SHIPPED | OUTPATIENT
Start: 2025-07-03

## 2025-07-18 ENCOUNTER — TELEPHONE (OUTPATIENT)
Dept: OBSTETRICS AND GYNECOLOGY | Facility: CLINIC | Age: 65
End: 2025-07-18
Payer: MEDICARE

## 2025-07-23 ENCOUNTER — PATIENT MESSAGE (OUTPATIENT)
Dept: FAMILY MEDICINE | Facility: CLINIC | Age: 65
End: 2025-07-23
Payer: MEDICARE

## 2025-07-23 DIAGNOSIS — F41.9 ANXIETY: Primary | ICD-10-CM

## 2025-07-25 RX ORDER — ALPRAZOLAM 1 MG/1
1 TABLET ORAL 2 TIMES DAILY
Qty: 60 TABLET | Refills: 0 | Status: SHIPPED | OUTPATIENT
Start: 2025-07-25 | End: 2025-08-24

## 2025-07-31 ENCOUNTER — OFFICE VISIT (OUTPATIENT)
Dept: FAMILY MEDICINE | Facility: CLINIC | Age: 65
End: 2025-07-31
Payer: MEDICARE

## 2025-07-31 VITALS
TEMPERATURE: 97 F | DIASTOLIC BLOOD PRESSURE: 77 MMHG | RESPIRATION RATE: 16 BRPM | HEIGHT: 66 IN | HEART RATE: 69 BPM | OXYGEN SATURATION: 97 % | BODY MASS INDEX: 36.32 KG/M2 | WEIGHT: 226 LBS | SYSTOLIC BLOOD PRESSURE: 126 MMHG

## 2025-07-31 DIAGNOSIS — L29.9 PRURITUS: Primary | ICD-10-CM

## 2025-07-31 DIAGNOSIS — E03.9 HYPOTHYROIDISM, UNSPECIFIED TYPE: ICD-10-CM

## 2025-07-31 DIAGNOSIS — M25.512 ACUTE PAIN OF LEFT SHOULDER: ICD-10-CM

## 2025-07-31 DIAGNOSIS — R11.2 NAUSEA AND VOMITING, UNSPECIFIED VOMITING TYPE: ICD-10-CM

## 2025-07-31 DIAGNOSIS — R06.02 SOB (SHORTNESS OF BREATH): ICD-10-CM

## 2025-07-31 DIAGNOSIS — M54.16 LUMBAR RADICULOPATHY, CHRONIC: ICD-10-CM

## 2025-07-31 DIAGNOSIS — R73.9 HYPERGLYCEMIA: ICD-10-CM

## 2025-07-31 PROBLEM — F33.1 MAJOR DEPRESSIVE DISORDER, RECURRENT, MODERATE: Status: ACTIVE | Noted: 2025-07-31

## 2025-07-31 PROCEDURE — 99215 OFFICE O/P EST HI 40 MIN: CPT | Mod: S$PBB,,, | Performed by: NURSE PRACTITIONER

## 2025-07-31 PROCEDURE — G2211 COMPLEX E/M VISIT ADD ON: HCPCS | Mod: ,,, | Performed by: NURSE PRACTITIONER

## 2025-07-31 PROCEDURE — 99999 PR PBB SHADOW E&M-EST. PATIENT-LVL IV: CPT | Mod: PBBFAC,,, | Performed by: NURSE PRACTITIONER

## 2025-07-31 PROCEDURE — 99214 OFFICE O/P EST MOD 30 MIN: CPT | Mod: PBBFAC | Performed by: NURSE PRACTITIONER

## 2025-07-31 RX ORDER — ONDANSETRON 4 MG/1
8 TABLET, ORALLY DISINTEGRATING ORAL EVERY 12 HOURS PRN
Qty: 90 TABLET | Refills: 3 | Status: SHIPPED | OUTPATIENT
Start: 2025-07-31

## 2025-07-31 NOTE — ASSESSMENT & PLAN NOTE
Right leg lateral, sole of the foot, heel tingling, numb. Needs LS MRI. Tripping over the toes of the foot, Having trouble lifting the toes to the right foot.

## 2025-07-31 NOTE — PROGRESS NOTES
Family Medicine    Patient ID: [id number]     Chief Complaint: Nausea and Itching      HPI:     Briseyda Torres is here today for generalized itching.    Past Medical History:   Diagnosis Date    Abnormal finding of blood chemistry, unspecified 02/14/2025    FBG is .      Acute deep vein thrombosis (DVT) of left lower extremity, unspecified vein 10/12/2023    pt stated not completely resolved however pcp stopped eliquis 1mo ago and dr kim will restart postop per pt    Allergies     Amenorrhea     Anemia     Anxiety     Arthritis     Closed displaced comminuted fracture of left patella, initial encounter     Closed displaced comminuted fracture of left patella, sequela     Depression     Diabetes mellitus, type 2     Digestive disorder     acid reflux    Endometriosis of uterus 1985    Dont remember date    Fibroid     1980    Fibromyalgia     Heart murmur     HLD (hyperlipidemia)     Hypertension     15 plus yrs    Insomnia     Menopause Now    Since age 29    Migraine     Obesity     Pain in left leg     Painful orthopaedic hardware     Plantar fasciitis, right     Pregnancy 10/26/1981    Live birth    SOB (shortness of breath)     STD (sexually transmitted disease)     1987    Stroke 2005    Surgical wound dehiscence, initial encounter 11/23/2023    Systemic lupus erythematosus, organ or system involvement unspecified     Thyroid disease     No thyroid        Past Surgical History:   Procedure Laterality Date    CATARACT EXTRACTION      CHOLECYSTECTOMY      COLONOSCOPY      ESOPHAGOGASTRODUODENOSCOPY      HYSTERECTOMY      MEHRAN BSO    INCISION AND DRAINAGE, LOWER EXTREMITY Left 11/23/2023    Procedure: INCISION AND DRAINAGE, LOWER EXTREMITY;  Surgeon: Curtis Kim MD;  Location: [facility];  Service: Orthopedics;  Laterality: Left;  I & D left knee for dehiscence/vascular table/bone foam/3L saline with cysto tubing/Vancomycin/Xperience irrigation/13cm Prevena    LUMBAR SPINE SURGERY      MANIPULATION  WITH ANESTHESIA Left 05/29/2024    Procedure: MANIPULATION, WITH ANESTHESIA;  Surgeon: Curtis Corona MD;  Location: [facility] OR;  Service: Orthopedics;  Laterality: Left;    OOPHORECTOMY      OPEN REDUCTION AND INTERNAL FIXATION (ORIF) OF FRACTURE OF PATELLA Left 10/27/2023    Procedure: ORIF, FRACTURE, PATELLA, LEFT;  Surgeon: Curtis Corona MD;  Location: [facility] OR;  Service: Orthopedics;  Laterality: Left;  Supine, vascular bed, bone foam, tourniquet  1st case  Synthes small CCHS, 18g wire, locking patella plates    OPEN REDUCTION AND INTERNAL FIXATION (ORIF) OF FRACTURE OF PATELLA Left 04/09/2024    Procedure: REVISION ORIF, FRACTURE, PATELLA;  Surgeon: Curtis Corona MD;  Location: [facility] OR;  Service: Orthopedics;  Laterality: Left;    OPEN REDUCTION AND INTERNAL FIXATION (ORIF) OF FRACTURE OF PATELLA Left 04/26/2024    Procedure: ORIF, FRACTURE, PATELLA WITH EX_FIX APPLICATION;  Surgeon: Curtis Corona MD;  Location: [facility] OR;  Service: Orthopedics;  Laterality: Left;  Supine, vascular bed, bone foam, tourniquet  Removal of CCHS screws and application synthes ex fix  #5 fiberwire x 3, hewson suture passer and drill    REMOVAL OF EXTERNAL FIXATION DEVICE Left 05/29/2024    Procedure: REMOVAL, EXTERNAL FIXATION DEVICE;  Surgeon: Curtis Corona MD;  Location: [facility];  Service: Orthopedics;  Laterality: Left;  1st case  vascular bed  no sterile setup needed  currette and betadine for pin sites  synthes ex fix removal  pt bringing hinge brace    ROTATOR CUFF REPAIR Bilateral     total of 4 surgeries    THYROIDECTOMY      TOTAL HIP ARTHROPLASTY Right     TOTAL KNEE ARTHROPLASTY Right     c        Social History     Tobacco Use    Smoking status: Never    Smokeless tobacco: Never    Tobacco comments:     Never smoked   Substance and Sexual Activity    Alcohol use: Never    Drug use: Not Currently    Sexual activity: Not Currently     Partners: Male     Birth control/protection: None     Comment: Not  currently having sex        Current Outpatient Medications   Medication Instructions    albuterol (PROVENTIL) 2.5 mg, 4 times daily PRN    ALPRAZolam (XANAX) 1 mg, Oral, 2 times daily    clindamycin (CLEOCIN T) 1 % external solution Topical (Top), 2 times daily    conjugated estrogens (PREMARIN) 0.5 g, Vaginal, Twice weekly    empagliflozin (JARDIANCE) 10 mg, Oral, Daily    EPINEPHrine (EPIPEN) 0.3 mg/0.3 mL AtIn 1 each, Once    EScitalopram oxalate (LEXAPRO) 20 mg, Oral, Daily    levothyroxine (SYNTHROID) 100 mcg, Daily    liothyronine (CYTOMEL) 5 mcg, Every morning    losartan-hydrochlorothiazide 100-12.5 mg (HYZAAR) 100-12.5 mg Tab 1 tablet, Daily    ondansetron (ZOFRAN-ODT) 8 mg, Oral, Every 12 hours PRN    oxybutynin (DITROPAN-XL) 5 mg, Oral, Daily    oxyCODONE-acetaminophen (PERCOCET)  mg per tablet 1 tablet, Every 12 hours PRN    rosuvastatin (CRESTOR) 40 mg, Oral, Daily    sumatriptan (IMITREX) 100 mg, Oral, Daily PRN       Review of patient's allergies indicates:   Allergen Reactions    Amlodipine Swelling     Per patient     Azithromycin     Levofloxacin     Lisinopril Edema        Patient Care Team:  Oanh Chan FNP as PCP - General (Family Medicine)  Curtis Corona MD as Consulting Physician (Orthopedic Surgery)  Olinda Wong MD as Consulting Physician (Breast Surgery)  [facility] as Chronic  (Pain Medicine)     Subjective:     Review of Systems   Constitutional:  Negative for appetite change, chills, diaphoresis and fever.   HENT:  Negative for ear pain, sinus pain and sore throat.    Eyes:  Negative for pain and visual disturbance.   Respiratory:  Negative for cough, shortness of breath and wheezing.    Cardiovascular:  Negative for chest pain, palpitations and leg swelling.   Gastrointestinal:  Negative for abdominal pain, blood in stool, diarrhea, nausea and vomiting.   Endocrine: Negative for cold intolerance.   Genitourinary:  Negative for difficulty  "urinating, dysuria, frequency and hematuria.   Musculoskeletal:  Positive for arthralgias, back pain and gait problem (left knee pain). Negative for joint swelling and myalgias.   Skin:  Positive for rash (Generalized itching). Negative for color change.   Allergic/Immunologic: Negative.    Neurological:  Positive for numbness (right leg, foot). Negative for dizziness, syncope and light-headedness.   Hematological: Negative.    Psychiatric/Behavioral: Negative.  Negative for dysphoric mood and suicidal ideas. The patient is not nervous/anxious.    All other systems reviewed and are negative.    I spent a total of 60 minutes on the day of the visit.This includes face to face time and non-face to face time preparing to see the patient (eg, review of tests), obtaining and/or reviewing separately obtained history, documenting clinical information in the electronic or other health record, independently interpreting results and communicating results to the patient/family/caregiver, or care coordinator.   12 point review of systems conducted, negative except as stated in the history of present illness. See HPI for details.    Objective:     Visit Vitals  /77   Pulse 69   Temp 97 °F (36.1 °C) (Temporal)   Resp 16   Ht 5' 6" (1.676 m)   Wt 102.5 kg (225 lb 15.5 oz)   SpO2 97%   BMI 36.47 kg/m²       Physical Exam  Vitals and nursing note reviewed.   Constitutional:       General: She is not in acute distress.     Appearance: She is not ill-appearing.   HENT:      Head: Normocephalic and atraumatic.      Mouth/Throat:      Mouth: Mucous membranes are moist.      Pharynx: Oropharynx is clear.   Eyes:      General: No scleral icterus.     Extraocular Movements: Extraocular movements intact.      Conjunctiva/sclera: Conjunctivae normal.      Pupils: Pupils are equal, round, and reactive to light.   Neck:      Vascular: No carotid bruit.   Cardiovascular:      Rate and Rhythm: Normal rate and regular rhythm.      Heart " sounds: No murmur heard.     No friction rub. No gallop.   Pulmonary:      Effort: Pulmonary effort is normal. No respiratory distress.      Breath sounds: Normal breath sounds. No wheezing, rhonchi or rales.   Abdominal:      General: Abdomen is flat. Bowel sounds are normal. There is no distension.      Palpations: Abdomen is soft. There is no mass.      Tenderness: There is no abdominal tenderness.   Musculoskeletal:      Left shoulder: Tenderness (Posterior) present. Decreased range of motion (Positive Speed's test negative empty can.).      Cervical back: Normal range of motion and neck supple.   Skin:     General: Skin is warm and dry.   Neurological:      General: No focal deficit present.      Mental Status: She is alert.   Psychiatric:         Mood and Affect: Mood normal.         Labs Reviewed:   Labs reviewed with patient, verbalized understanding.  Chemistry:  Lab Results   Component Value Date     05/08/2025    K 4.0 05/08/2025    BUN 15 05/08/2025    CREATININE 1.00 05/08/2025    EGFRNORACEVR 63 05/08/2025    CALCIUM 10.1 05/08/2025    ALKPHOS 105 05/08/2025    ALBUMIN 4.7 05/08/2025    AST 31 05/08/2025    ALT 27 05/08/2025    MG 2.10 11/27/2023    PHOS 3.6 11/23/2023    ATDXRZSX27NL 31 05/08/2025    TSH 0.445 02/14/2025    UIUQDK6SSLG 0.93 02/14/2025        Lab Results   Component Value Date    HGBA1C 6.3 (H) 05/08/2025        Hematology:  Lab Results   Component Value Date    WBC 8.13 05/08/2025    HGB 13.1 05/08/2025    HCT 39.7 05/08/2025     05/08/2025       Lipid Panel:  Lab Results   Component Value Date    CHOL 169 05/08/2025    HDL 56 05/08/2025    TRIG 184 05/08/2025        Urine:  Lab Results   Component Value Date    APPEARANCEUA Clear 06/06/2025    SGUA <=1.005 06/06/2025    PROTEINUA Negative 06/06/2025    KETONESUA Negative 06/06/2025    LEUKOCYTESUR Negative 06/06/2025    CREATRANDUR 210.3 (H) 05/08/2025        Assessment:       ICD-10-CM ICD-9-CM   1. Pruritus  L29.9  698.9   2. SOB (shortness of breath)  R06.02 786.05   3. Hypothyroidism, unspecified type  E03.9 244.9   4. Hyperglycemia  R73.9 790.29   5. Lumbar radiculopathy, chronic  M54.16 724.4   6. Acute pain of left shoulder  M25.512 719.41   7. Nausea and vomiting, unspecified vomiting type  R11.2 787.01        Plan:     1. Pruritus  Assessment & Plan:  Generalized itching, allergy testing, LFT, us retroperitoneal. Refer to derm.    Orders:  -     CBC Auto Differential; Future; Expected date: 07/31/2025  -     Comprehensive Metabolic Panel; Future; Expected date: 07/31/2025  -     Lipid Panel; Future; Expected date: 07/31/2025  -     Hemoglobin A1C; Future; Expected date: 07/31/2025  -     Hepatic Function Panel; Future; Expected date: 07/31/2025  -     Allergen Food Panel IgE; Future; Expected date: 07/31/2025  -     Allergen Respiratory Panel IgE - Region 6; Future; Expected date: 07/31/2025  -     Ambulatory referral/consult to Dermatology; Future; Expected date: 08/07/2025    2. SOB (shortness of breath)  Assessment & Plan:  Having SOB with generalized itching. Will do allergy testing.    Orders:  -     Hepatic Function Panel; Future; Expected date: 07/31/2025  -     Allergen Food Panel IgE; Future; Expected date: 07/31/2025    3. Hypothyroidism, unspecified type  Overview:  Patient on Cytomel 5 mcg in Synthroid 100mcg    Assessment & Plan:  Will check TSH.    Orders:  -     Lipid Panel; Future; Expected date: 07/31/2025  -     TSH; Future; Expected date: 07/31/2025  -     Urinalysis; Future; Expected date: 07/31/2025    4. Hyperglycemia  Assessment & Plan:  FBG improving. Advised to continue with checking BG.    Orders:  -     Hemoglobin A1C; Future; Expected date: 07/31/2025    5. Lumbar radiculopathy, chronic  Assessment & Plan:  Right leg lateral, sole of the foot, heel tingling, numb. Needs LS MRI. Tripping over the toes of the foot, Having trouble lifting the toes to the right foot.    Orders:  -     MRI Lumbar  Spine W WO Contrast; Future; Expected date: 07/31/2025    6. Acute pain of left shoulder  Assessment & Plan:  Positive sppeds test, negative empty can, crepitus to the posterior aspect. Will order xray.    Orders:  -     X-Ray Scapula Left; Future; Expected date: 07/31/2025    7. Nausea and vomiting, unspecified vomiting type  Assessment & Plan:  N/V, advised ondansetron PRN.    Orders:  -     ondansetron (ZOFRAN-ODT) 4 MG TbDL; Take 2 tablets (8 mg total) by mouth every 12 (twelve) hours as needed (nausea).  Dispense: 90 tablet; Refill: 3         No follow-ups on file. In addition to their scheduled follow up, the patient has also been instructed to follow up on as needed basis.     Future Appointments   Date Time Provider Department Center   8/7/2025  2:20 PM [facility]agan [facility]   9/3/2025 11:00 AM [address] [place]    9/3/2025  1:00 PM Olinda Wong MD [facility] [place]   9/9/2025  1:00 PM [facility] [place]

## 2025-08-01 ENCOUNTER — PATIENT MESSAGE (OUTPATIENT)
Dept: FAMILY MEDICINE | Facility: CLINIC | Age: 65
End: 2025-08-01
Payer: MEDICARE

## 2025-08-04 ENCOUNTER — HOSPITAL ENCOUNTER (OUTPATIENT)
Dept: RADIOLOGY | Facility: HOSPITAL | Age: 65
Discharge: HOME | End: 2025-08-04
Attending: NURSE PRACTITIONER
Payer: MEDICARE

## 2025-08-04 ENCOUNTER — RESULTS FOLLOW-UP (OUTPATIENT)
Dept: FAMILY MEDICINE | Facility: CLINIC | Age: 65
End: 2025-08-04
Payer: MEDICARE

## 2025-08-04 DIAGNOSIS — R79.89 ELEVATED SERUM CREATININE: Primary | ICD-10-CM

## 2025-08-04 DIAGNOSIS — M54.16 LUMBAR RADICULOPATHY, CHRONIC: ICD-10-CM

## 2025-08-04 DIAGNOSIS — E03.9 HYPOTHYROIDISM, UNSPECIFIED TYPE: ICD-10-CM

## 2025-08-04 DIAGNOSIS — M25.512 ACUTE PAIN OF LEFT SHOULDER: ICD-10-CM

## 2025-08-04 PROCEDURE — 25500020 PHARM REV CODE 255: Performed by: NURSE PRACTITIONER

## 2025-08-04 PROCEDURE — A9577 INJ MULTIHANCE: HCPCS | Performed by: NURSE PRACTITIONER

## 2025-08-04 PROCEDURE — 72158 MRI LUMBAR SPINE W/O & W/DYE: CPT | Mod: TC

## 2025-08-04 PROCEDURE — 73010 X-RAY EXAM OF SHOULDER BLADE: CPT | Mod: TC,LT

## 2025-08-04 RX ADMIN — GADOBENATE DIMEGLUMINE 15 ML: 529 INJECTION, SOLUTION INTRAVENOUS at 03:08

## 2025-08-05 RX ORDER — LEVOTHYROXINE SODIUM 88 UG/1
88 TABLET ORAL
Qty: 30 TABLET | Refills: 11 | Status: SHIPPED | OUTPATIENT
Start: 2025-08-05 | End: 2025-08-06

## 2025-08-06 RX ORDER — LEVOTHYROXINE SODIUM 88 UG/1
88 TABLET ORAL
Qty: 90 TABLET | Refills: 3 | Status: SHIPPED | OUTPATIENT
Start: 2025-08-06 | End: 2026-08-06

## 2025-08-13 ENCOUNTER — PATIENT MESSAGE (OUTPATIENT)
Dept: ORTHOPEDICS | Facility: CLINIC | Age: 65
End: 2025-08-13
Payer: MEDICARE

## 2025-08-14 DIAGNOSIS — F41.9 ANXIETY: ICD-10-CM

## 2025-08-14 DIAGNOSIS — G43.709 CHRONIC MIGRAINE WITHOUT AURA WITHOUT STATUS MIGRAINOSUS, NOT INTRACTABLE: ICD-10-CM

## 2025-08-14 DIAGNOSIS — M32.8 OTHER FORMS OF SYSTEMIC LUPUS ERYTHEMATOSUS, UNSPECIFIED ORGAN INVOLVEMENT STATUS: ICD-10-CM

## 2025-08-14 RX ORDER — ALPRAZOLAM 1 MG/1
1 TABLET ORAL 2 TIMES DAILY
Qty: 60 TABLET | Refills: 0 | Status: SHIPPED | OUTPATIENT
Start: 2025-08-14 | End: 2025-09-13

## 2025-08-15 RX ORDER — CLINDAMYCIN PHOSPHATE 11.9 MG/ML
SOLUTION TOPICAL 2 TIMES DAILY
Qty: 60 ML | Refills: 1 | Status: SHIPPED | OUTPATIENT
Start: 2025-08-15

## 2025-08-15 RX ORDER — SUMATRIPTAN SUCCINATE 100 MG/1
100 TABLET ORAL DAILY PRN
Qty: 8 TABLET | Refills: 3 | Status: SHIPPED | OUTPATIENT
Start: 2025-08-15

## 2025-08-18 ENCOUNTER — PATIENT MESSAGE (OUTPATIENT)
Dept: ORTHOPEDICS | Facility: CLINIC | Age: 65
End: 2025-08-18
Payer: MEDICARE

## 2025-08-18 DIAGNOSIS — N36.8 URETHRAL PROLAPSE: Primary | ICD-10-CM

## 2025-08-18 DIAGNOSIS — M32.8 OTHER FORMS OF SYSTEMIC LUPUS ERYTHEMATOSUS, UNSPECIFIED ORGAN INVOLVEMENT STATUS: ICD-10-CM

## 2025-08-18 DIAGNOSIS — G43.709 CHRONIC MIGRAINE WITHOUT AURA WITHOUT STATUS MIGRAINOSUS, NOT INTRACTABLE: ICD-10-CM

## 2025-08-18 RX ORDER — SUMATRIPTAN SUCCINATE 50 MG/1
100 TABLET ORAL DAILY PRN
Qty: 30 TABLET | Refills: 11 | Status: SHIPPED | OUTPATIENT
Start: 2025-08-18

## 2025-08-18 RX ORDER — CLINDAMYCIN PHOSPHATE 11.9 MG/ML
SOLUTION TOPICAL 2 TIMES DAILY
Qty: 60 ML | Refills: 1 | Status: SHIPPED | OUTPATIENT
Start: 2025-08-18

## 2025-08-19 ENCOUNTER — OFFICE VISIT (OUTPATIENT)
Dept: OBSTETRICS AND GYNECOLOGY | Facility: CLINIC | Age: 65
End: 2025-08-19
Payer: MEDICARE

## 2025-08-19 VITALS
WEIGHT: 223.19 LBS | SYSTOLIC BLOOD PRESSURE: 124 MMHG | DIASTOLIC BLOOD PRESSURE: 80 MMHG | BODY MASS INDEX: 35.87 KG/M2 | HEIGHT: 66 IN

## 2025-08-19 DIAGNOSIS — N36.8 URETHRAL PROLAPSE: ICD-10-CM

## 2025-08-19 DIAGNOSIS — N81.6 RECTOCELE: ICD-10-CM

## 2025-08-19 DIAGNOSIS — N95.2 VAGINAL ATROPHY: ICD-10-CM

## 2025-08-19 DIAGNOSIS — R68.83 CHILLS (WITHOUT FEVER): ICD-10-CM

## 2025-08-19 DIAGNOSIS — R23.2 HOT FLASHES: Primary | ICD-10-CM

## 2025-08-19 DIAGNOSIS — N81.11 CYSTOCELE, MIDLINE: ICD-10-CM

## 2025-08-19 PROCEDURE — 99214 OFFICE O/P EST MOD 30 MIN: CPT | Mod: ,,, | Performed by: OBSTETRICS & GYNECOLOGY

## 2025-08-19 PROCEDURE — 87086 URINE CULTURE/COLONY COUNT: CPT | Performed by: OBSTETRICS & GYNECOLOGY

## 2025-08-19 RX ORDER — FEZOLINETANT 45 MG/1
1 TABLET, FILM COATED ORAL DAILY
Qty: 30 TABLET | Refills: 11 | Status: SHIPPED | OUTPATIENT
Start: 2025-08-19 | End: 2025-09-18

## 2025-08-21 ENCOUNTER — PATIENT MESSAGE (OUTPATIENT)
Dept: FAMILY MEDICINE | Facility: CLINIC | Age: 65
End: 2025-08-21
Payer: MEDICARE

## 2025-08-21 DIAGNOSIS — I10 HYPERTENSION, UNSPECIFIED TYPE: Primary | ICD-10-CM

## 2025-08-21 DIAGNOSIS — E03.9 HYPOTHYROIDISM, UNSPECIFIED TYPE: ICD-10-CM

## 2025-08-21 RX ORDER — LEVOTHYROXINE SODIUM 88 UG/1
88 TABLET ORAL
Qty: 90 TABLET | Refills: 3 | Status: SHIPPED | OUTPATIENT
Start: 2025-08-21 | End: 2026-08-21

## 2025-08-22 LAB — BACTERIA UR CULT: NO GROWTH

## 2025-08-22 RX ORDER — LOSARTAN POTASSIUM AND HYDROCHLOROTHIAZIDE 12.5; 1 MG/1; MG/1
1 TABLET ORAL DAILY
Qty: 90 TABLET | Refills: 3 | Status: SHIPPED | OUTPATIENT
Start: 2025-08-22

## 2025-08-25 ENCOUNTER — TELEPHONE (OUTPATIENT)
Dept: OBSTETRICS AND GYNECOLOGY | Facility: CLINIC | Age: 65
End: 2025-08-25
Payer: MEDICARE

## 2025-08-29 ENCOUNTER — HOSPITAL ENCOUNTER (OUTPATIENT)
Dept: RADIOLOGY | Facility: CLINIC | Age: 65
Discharge: HOME | End: 2025-08-29
Attending: ORTHOPAEDIC SURGERY
Payer: MEDICARE

## 2025-08-29 ENCOUNTER — TELEPHONE (OUTPATIENT)
Dept: FAMILY MEDICINE | Facility: CLINIC | Age: 65
End: 2025-08-29
Payer: MEDICARE

## 2025-08-29 ENCOUNTER — OFFICE VISIT (OUTPATIENT)
Dept: ORTHOPEDICS | Facility: CLINIC | Age: 65
End: 2025-08-29
Payer: MEDICARE

## 2025-08-29 VITALS
HEIGHT: 66 IN | BODY MASS INDEX: 35.86 KG/M2 | WEIGHT: 223.13 LBS | DIASTOLIC BLOOD PRESSURE: 88 MMHG | SYSTOLIC BLOOD PRESSURE: 138 MMHG | HEART RATE: 58 BPM

## 2025-08-29 DIAGNOSIS — M25.562 PAIN IN BOTH KNEES, UNSPECIFIED CHRONICITY: Primary | ICD-10-CM

## 2025-08-29 DIAGNOSIS — M25.561 PAIN IN BOTH KNEES, UNSPECIFIED CHRONICITY: Primary | ICD-10-CM

## 2025-08-29 DIAGNOSIS — M17.12 PRIMARY OSTEOARTHRITIS OF LEFT KNEE: ICD-10-CM

## 2025-08-29 DIAGNOSIS — T84.012A FAILED TOTAL RIGHT KNEE REPLACEMENT, INITIAL ENCOUNTER: ICD-10-CM

## 2025-09-05 DIAGNOSIS — F41.9 ANXIETY: ICD-10-CM

## 2025-09-05 RX ORDER — ALPRAZOLAM 1 MG/1
1 TABLET ORAL 2 TIMES DAILY
Qty: 60 TABLET | Refills: 0 | Status: SHIPPED | OUTPATIENT
Start: 2025-09-26 | End: 2025-10-26

## (undated) DEVICE — DRAPE ORTH SPLIT 77X108IN

## (undated) DEVICE — PAD CAST 6X4YD SPECIALISTIC

## (undated) DEVICE — SUT PDS PLUS MONO 1 CT 36IN

## (undated) DEVICE — Device

## (undated) DEVICE — SUT CTD VICRYL CT-1 27

## (undated) DEVICE — DRAPE STERI U-SHAPED 47X51IN

## (undated) DEVICE — COVER FULLGUARD SHOE HIGH-TOP

## (undated) DEVICE — GUIDEWIRE TROCAR TIP 1.4X150MM
Type: IMPLANTABLE DEVICE | Site: KNEE | Status: NON-FUNCTIONAL
Removed: 2024-04-09

## (undated) DEVICE — BNDG COFLEX FOAM LF2 ST 6X5YD

## (undated) DEVICE — STAPLER SKIN PROXIMATE WIDE

## (undated) DEVICE — COVER TABLE HVY DTY 60X90IN

## (undated) DEVICE — SET CYSTO IRR DRP CHMBR 84IN

## (undated) DEVICE — GLOVE SIGNATURE MICRO LTX 6.5

## (undated) DEVICE — GLOVE PROTEXIS LTX MICRO 8

## (undated) DEVICE — PADDING 4X4YD SPECIALIST100

## (undated) DEVICE — SUT ABSRB MONO 2-0 CT-2 27IN

## (undated) DEVICE — TAPE SILK 3IN

## (undated) DEVICE — PADDDING CAST WEBRIL 4YDX3IN

## (undated) DEVICE — BANDAGE ESMARK 6INX3YD

## (undated) DEVICE — GLOVE PROTEXIS BLUE LATEX 7

## (undated) DEVICE — SUT VICRYL BR 1 GEN 27 CT-1

## (undated) DEVICE — SPONGE GAUZE 4X4 12 PLY STRL

## (undated) DEVICE — GUIDEWIRE TROCAR TIP 1.4X150MM
Type: IMPLANTABLE DEVICE | Site: LEG | Status: NON-FUNCTIONAL
Removed: 2023-10-27

## (undated) DEVICE — APPLICATOR CHLORAPREP ORN 26ML

## (undated) DEVICE — BANDAGE FLEX-MASTER CLP 6X11YD

## (undated) DEVICE — GLOVE 8 PROTEXIS PI ORTHO

## (undated) DEVICE — STOCKINETTE IMPERVIOUS 16X54IN

## (undated) DEVICE — SUT VICRYL 2-0 36 CT-1

## (undated) DEVICE — ELECTRODE REM POLYHESIVE II

## (undated) DEVICE — IMMOBILIZER TRI-PNL KNEE 26IN

## (undated) DEVICE — GEL SURGX ANTIMICROBIAL 7.5ML

## (undated) DEVICE — DRAPE C-ARMOR EQUIPMENT COVER

## (undated) DEVICE — GLOVE SENSICARE PI GRN 6.5

## (undated) DEVICE — SCREW BONE MP 2.7X42MM
Type: IMPLANTABLE DEVICE | Site: LEG | Status: NON-FUNCTIONAL
Removed: 2023-10-27

## (undated) DEVICE — DRESSING XEROFORM 5X9IN

## (undated) DEVICE — GUIDEWIRE TROCAR TIP 1.4X150MM
Type: IMPLANTABLE DEVICE | Site: PATELLA | Status: NON-FUNCTIONAL
Removed: 2024-04-26

## (undated) DEVICE — GOWN SMARTSLEEVE AAMI LVL4 XL

## (undated) DEVICE — BANDAGE ACE DOUBLE STER 6IN

## (undated) DEVICE — BIT DRILL CANN COMPR 2.7X145MM

## (undated) DEVICE — COLLAGEN CELLERATE ACTIVATED 1GM

## (undated) DEVICE — KIT PREVENA INCISION MGMT 13CM

## (undated) DEVICE — GLOVE PROTEXIS PI CRM 7

## (undated) DEVICE — BIT DRILL QC 3FLUTED 3.2X145 S

## (undated) DEVICE — SUT FIBERWIRE #5 1/2 X 38

## (undated) DEVICE — SUT BLK MONO 3-0 CUT 18IN F

## (undated) DEVICE — ELECTRODE PATIENT RETURN DISP

## (undated) DEVICE — GLOVE SURG PLYSPHRN ORTH SZ 8

## (undated) DEVICE — KIT SURGICAL TURNOVER

## (undated) DEVICE — SUT BLU BR 2 TAPERD NDL 1/2

## (undated) DEVICE — SPONGE LAP 18X18 PREWASHED

## (undated) DEVICE — SOL NACL IRR 3000ML

## (undated) DEVICE — 2.0 DRILL BIT

## (undated) DEVICE — SPONGE COTTON TRAY 4X4IN